# Patient Record
Sex: FEMALE | Race: WHITE | NOT HISPANIC OR LATINO | Employment: FULL TIME | URBAN - METROPOLITAN AREA
[De-identification: names, ages, dates, MRNs, and addresses within clinical notes are randomized per-mention and may not be internally consistent; named-entity substitution may affect disease eponyms.]

---

## 2017-04-03 DIAGNOSIS — E03.9 HYPOTHYROIDISM: ICD-10-CM

## 2017-04-03 RX ORDER — LEVOTHYROXINE SODIUM 137 UG/1
TABLET ORAL
Qty: 30 TABLET | Refills: 1 | Status: SHIPPED | OUTPATIENT
Start: 2017-04-03 | End: 2017-05-24

## 2017-04-03 NOTE — TELEPHONE ENCOUNTER
Patient due for thyroid lab but has appointment with PCP next month.  Put note on appointment reason.     Alyssa Coleman RN

## 2017-05-10 ENCOUNTER — RADIANT APPOINTMENT (OUTPATIENT)
Dept: MAMMOGRAPHY | Facility: CLINIC | Age: 62
End: 2017-05-10
Attending: PHYSICIAN ASSISTANT
Payer: COMMERCIAL

## 2017-05-10 DIAGNOSIS — Z12.31 VISIT FOR SCREENING MAMMOGRAM: ICD-10-CM

## 2017-05-10 PROCEDURE — G0202 SCR MAMMO BI INCL CAD: HCPCS | Mod: TC

## 2017-05-24 ENCOUNTER — OFFICE VISIT (OUTPATIENT)
Dept: FAMILY MEDICINE | Facility: CLINIC | Age: 62
End: 2017-05-24
Payer: COMMERCIAL

## 2017-05-24 VITALS
TEMPERATURE: 98.5 F | WEIGHT: 225 LBS | BODY MASS INDEX: 37.49 KG/M2 | SYSTOLIC BLOOD PRESSURE: 132 MMHG | DIASTOLIC BLOOD PRESSURE: 86 MMHG | OXYGEN SATURATION: 96 % | HEIGHT: 65 IN | HEART RATE: 70 BPM

## 2017-05-24 DIAGNOSIS — E03.8 OTHER SPECIFIED HYPOTHYROIDISM: ICD-10-CM

## 2017-05-24 DIAGNOSIS — I10 ESSENTIAL HYPERTENSION WITH GOAL BLOOD PRESSURE LESS THAN 140/90: Primary | ICD-10-CM

## 2017-05-24 DIAGNOSIS — R06.02 EXERTIONAL SHORTNESS OF BREATH: ICD-10-CM

## 2017-05-24 DIAGNOSIS — E03.9 HYPOTHYROIDISM, UNSPECIFIED TYPE: ICD-10-CM

## 2017-05-24 DIAGNOSIS — R42 DIZZINESS: ICD-10-CM

## 2017-05-24 DIAGNOSIS — E78.5 HYPERLIPIDEMIA LDL GOAL <130: ICD-10-CM

## 2017-05-24 DIAGNOSIS — R20.9 DISTURBANCE OF SKIN SENSATION: ICD-10-CM

## 2017-05-24 LAB
ALBUMIN SERPL-MCNC: 3.6 G/DL (ref 3.4–5)
ANION GAP SERPL CALCULATED.3IONS-SCNC: 8 MMOL/L (ref 3–14)
BUN SERPL-MCNC: 19 MG/DL (ref 7–30)
CALCIUM SERPL-MCNC: 9.5 MG/DL (ref 8.5–10.1)
CHLORIDE SERPL-SCNC: 106 MMOL/L (ref 94–109)
CHOLEST SERPL-MCNC: 231 MG/DL
CO2 SERPL-SCNC: 29 MMOL/L (ref 20–32)
CREAT SERPL-MCNC: 0.84 MG/DL (ref 0.52–1.04)
ERYTHROCYTE [DISTWIDTH] IN BLOOD BY AUTOMATED COUNT: 12.5 % (ref 10–15)
GFR SERPL CREATININE-BSD FRML MDRD: 68 ML/MIN/1.7M2
GLUCOSE SERPL-MCNC: 94 MG/DL (ref 70–99)
HCT VFR BLD AUTO: 40.9 % (ref 35–47)
HDLC SERPL-MCNC: 73 MG/DL
HGB BLD-MCNC: 13.7 G/DL (ref 11.7–15.7)
LDLC SERPL CALC-MCNC: 137 MG/DL
MCH RBC QN AUTO: 32.4 PG (ref 26.5–33)
MCHC RBC AUTO-ENTMCNC: 33.5 G/DL (ref 31.5–36.5)
MCV RBC AUTO: 97 FL (ref 78–100)
NONHDLC SERPL-MCNC: 158 MG/DL
PHOSPHATE SERPL-MCNC: 3.3 MG/DL (ref 2.5–4.5)
PLATELET # BLD AUTO: 216 10E9/L (ref 150–450)
POTASSIUM SERPL-SCNC: 4.2 MMOL/L (ref 3.4–5.3)
RBC # BLD AUTO: 4.23 10E12/L (ref 3.8–5.2)
SODIUM SERPL-SCNC: 143 MMOL/L (ref 133–144)
TRIGL SERPL-MCNC: 106 MG/DL
TSH SERPL DL<=0.005 MIU/L-ACNC: 2.7 MU/L (ref 0.4–4)
VIT B12 SERPL-MCNC: 368 PG/ML (ref 193–986)
WBC # BLD AUTO: 5.1 10E9/L (ref 4–11)

## 2017-05-24 PROCEDURE — 36415 COLL VENOUS BLD VENIPUNCTURE: CPT | Performed by: PHYSICIAN ASSISTANT

## 2017-05-24 PROCEDURE — 85027 COMPLETE CBC AUTOMATED: CPT | Performed by: PHYSICIAN ASSISTANT

## 2017-05-24 PROCEDURE — 80061 LIPID PANEL: CPT | Performed by: PHYSICIAN ASSISTANT

## 2017-05-24 PROCEDURE — 82607 VITAMIN B-12: CPT | Performed by: PHYSICIAN ASSISTANT

## 2017-05-24 PROCEDURE — 80069 RENAL FUNCTION PANEL: CPT | Performed by: PHYSICIAN ASSISTANT

## 2017-05-24 PROCEDURE — 99214 OFFICE O/P EST MOD 30 MIN: CPT | Performed by: PHYSICIAN ASSISTANT

## 2017-05-24 PROCEDURE — 84443 ASSAY THYROID STIM HORMONE: CPT | Performed by: PHYSICIAN ASSISTANT

## 2017-05-24 RX ORDER — LOSARTAN POTASSIUM AND HYDROCHLOROTHIAZIDE 25; 100 MG/1; MG/1
1 TABLET ORAL DAILY
Qty: 90 TABLET | Refills: 1 | Status: SHIPPED | OUTPATIENT
Start: 2017-05-24 | End: 2018-01-21

## 2017-05-24 RX ORDER — LEVOTHYROXINE SODIUM 137 UG/1
TABLET ORAL
Qty: 30 TABLET | Refills: 1 | Status: CANCELLED | OUTPATIENT
Start: 2017-05-24

## 2017-05-24 RX ORDER — SIMVASTATIN 20 MG
20 TABLET ORAL AT BEDTIME
Qty: 90 TABLET | Refills: 1 | Status: SHIPPED | OUTPATIENT
Start: 2017-05-24 | End: 2017-05-24 | Stop reason: DRUGHIGH

## 2017-05-24 RX ORDER — LEVOTHYROXINE SODIUM 137 UG/1
137 TABLET ORAL DAILY
Qty: 90 TABLET | Refills: 3 | Status: SHIPPED | OUTPATIENT
Start: 2017-05-24 | End: 2018-03-29

## 2017-05-24 RX ORDER — SIMVASTATIN 40 MG
40 TABLET ORAL AT BEDTIME
Qty: 90 TABLET | Refills: 3 | Status: SHIPPED | OUTPATIENT
Start: 2017-05-24 | End: 2018-03-29

## 2017-05-24 RX ORDER — METOPROLOL SUCCINATE 100 MG/1
100 TABLET, EXTENDED RELEASE ORAL DAILY
Qty: 90 TABLET | Refills: 1 | Status: SHIPPED | OUTPATIENT
Start: 2017-05-24 | End: 2018-03-05

## 2017-05-24 NOTE — PATIENT INSTRUCTIONS
Contact [facility] [phone] to schedule appointment for the stress test

## 2017-05-24 NOTE — MR AVS SNAPSHOT
"              After Visit Summary   5/24/2017    Migdalia Escalera    MRN: [id number]           Patient Information     Date Of Birth          1955        Visit Information        Provider Department      5/24/2017 8:30 AM Kehr, Kristen M, PA-C [facility]        Today's Diagnoses     Essential hypertension with goal blood pressure less than 140/90    -  1    Hyperlipidemia LDL goal <130        Exertional shortness of breath        Disturbance of skin sensation        Hypothyroidism, unspecified type          Care Instructions    Contact [facility] [phone] to schedule appointment for the stress test            Follow-ups after your visit        Future tests that were ordered for you today     Open Future Orders        Priority Expected Expires Ordered    Exercise Stress Echocardiogram Routine  5/24/2018 5/24/2017            Who to contact     If you have questions or need follow up information about today's clinic visit or your schedule please contact [facility] directly at [phone].  Normal or non-critical lab and imaging results will be communicated to you by MyChart, letter or phone within 4 business days after the clinic has received the results. If you do not hear from us within 7 days, please contact the clinic through [employer] or phone. If you have a critical or abnormal lab result, we will notify you by phone as soon as possible.  Submit refill requests through [employer] or call your pharmacy and they will forward the refill request to us. Please allow 3 business days for your refill to be completed.          Additional Information About Your Visit        MyChart Information     [employer] lets you send messages to your doctor, view your test results, renew your prescriptions, schedule appointments and more. To sign up, go to [email or web address] . Click on \"Log in\" on the left side of the screen, which will take you to the Welcome page. Then click on \"Sign up Now\" on " "the right side of the page.     You will be asked to enter the access code listed below, as well as some personal information. Please follow the directions to create your username and password.     Your access code is: [email or web address]  Expires: 2017  8:55 AM     Your access code will  in 90 days. If you need help or a new code, please call your [facility] or [phone].        Care EveryWhere ID     This is your Care EveryWhere ID. This could be used by other organizations to access your [place] medical records  [id number]        Your Vitals Were     Pulse Temperature Height Pulse Oximetry BMI (Body Mass Index)       70 98.5  F (36.9  C) (Oral) 5' 5\" (1.651 m) 96% 37.44 kg/m2        Blood Pressure from Last 3 Encounters:   17 132/86   10/27/16 136/80   16 132/72    Weight from Last 3 Encounters:   17 225 lb (102.1 kg)   10/27/16 225 lb (102.1 kg)   16 225 lb (102.1 kg)              We Performed the Following     CBC with platelets     Lipid panel reflex to direct LDL     Renal panel     TSH with free T4 reflex     Vitamin B12          Today's Medication Changes          These changes are accurate as of: 17  8:55 AM.  If you have any questions, ask your nurse or doctor.               These medicines have changed or have updated prescriptions.        Dose/Directions    simvastatin 20 MG tablet   Commonly known as:  ZOCOR   This may have changed:  additional instructions   Used for:  Hyperlipidemia LDL goal <130   Changed by:  Kehr, Kristen M, PA-C        Dose:  20 mg   Take 1 tablet (20 mg) by mouth At Bedtime   Quantity:  90 tablet   Refills:  1            Where to get your medicines      These medications were sent to [id number] - [place] - [address]  [address] [address]    Hours:  Same info as Marissa Cote Phone:  [phone]     losartan-hydrochlorothiazide 100-25 MG per tablet    metoprolol 100 MG 24 hr tablet    " simvastatin 20 MG tablet                Primary Care Provider Office Phone # Fax #    Amy M Kehr, PA-C [phone]       [facility] [address] [facility] [address]        Thank you!     Thank you for choosing [facility]  for your care. Our goal is always to provide you with excellent care. Hearing back from our patients is one way we can continue to improve our services. Please take a few minutes to complete the written survey that you may receive in the mail after your visit with us. Thank you!             Your Updated Medication List - Protect others around you: Learn how to safely use, store and throw away your medicines at www.disposemymeds.org.          This list is accurate as of: 5/24/17  8:55 AM.  Always use your most recent med list.                   Brand Name Dispense Instructions for use    albuterol 108 (90 BASE) MCG/ACT Inhaler    PROAIR HFA/PROVENTIL HFA/VENTOLIN HFA    1 Inhaler    Inhale 2 puffs into the lungs every 4 hours as needed for shortness of breath / dyspnea or wheezing Use with spacer       aspirin 81 MG tablet      Take 1 tablet by mouth daily.       furosemide 20 MG tablet    LASIX    30 tablet    Take 1 tablet (20 mg) by mouth daily       levothyroxine 137 MCG tablet    SYNTHROID/LEVOTHROID    30 tablet    TAKE 1 TABLET (137 MCG) BY ORAL ROUTE ONCE DAILY       losartan-hydrochlorothiazide 100-25 MG per tablet    HYZAAR    90 tablet    Take 1 tablet by mouth daily APPT NEEDED FOR FURTHER REFILLS       metoprolol 100 MG 24 hr tablet    TOPROL-XL    90 tablet    Take 1 tablet (100 mg) by mouth daily APPT NEEDED FOR FURTHER REFILLS       order for DME     1 Units    Equipment being ordered: compression stockings / mild compression       silver sulfADIAZINE 1 % cream    SILVADENE    400 g    Apply topically 2 times daily       simvastatin 20 MG tablet    ZOCOR    90 tablet    Take 1 tablet (20 mg) by mouth At Bedtime       spacer/aero-hold  chamber mask Olya     1 each    1 Adult size spacer to use with MDI inhalers.

## 2017-05-24 NOTE — PROGRESS NOTES
SUBJECTIVE:                                                    Migdalia Escalera is a 62 year old female who presents to clinic today for the following health issues:      Hypertension Follow-up      Outpatient blood pressures are not being checked.    Low Salt Diet: no added salt     Hypothyroidism Follow-up      Since last visit, patient describes the following symptoms: Weight stable, no hair loss, no skin changes, no constipation, no loose stools       Amount of exercise or physical activity:     Problems taking medications regularly: No    Medication side effects: none    Diet:       PROBLEMS TO ADD ON...  1. Dario has been having episodic dizzy / vertigo spells. They are positional. Mainly with bending forward. There is no congestion / allergy symptoms. The episodes will last for a few seconds and resolve. She does not check her blood pressure at home. She has had good control of her blood pressure with her current medications. She has been taking her medications daily. She has also noticed tingling in her feet.     2. She also started exercising on her elliptical machine at home. She was short of breath when she first started and had to stop. She continued trying to exercise, but the same thing would happen. She used her inhaler and it did not help. She thought it would improve the more she exercised, but after a month of trying, she quit. She would have to sit down to catch her breath after 45 minutes. She was never able to build stamina. No dizziness or chest pain / pressure with exercise.     Problem list and histories reviewed & adjusted, as indicated.  Additional history: as documented    Patient Active Problem List   Diagnosis     Hyperlipidemia LDL goal <130     Advanced directives, counseling/discussion     H/O: hysterectomy     Vitamin D deficiency     Hypothyroidism, unspecified hypothyroidism type     History of colonic polyps     Essential hypertension with goal blood pressure less than 140/90      Past Surgical History:   Procedure Laterality Date     HYSTERECTOMY, PAP NO LONGER INDICATED       SURGICAL HISTORY OF -       thoracic spine       Social History   Substance Use Topics     Smoking status: Former Smoker     Types: Cigarettes     Quit date: 12/1/2005     Smokeless tobacco: Never Used      Comment: no 2nd hand smoke exposure     Alcohol use Yes      Comment: occ.     Family History   Problem Relation Age of Onset     Arthritis Mother      Prostate Cancer Father      Hypertension Father      CANCER Father      bone     DIABETES Maternal Grandfather      Thyroid Disease Sister          Current Outpatient Prescriptions   Medication Sig Dispense Refill     losartan-hydrochlorothiazide (HYZAAR) 100-25 MG per tablet Take 1 tablet by mouth daily APPT NEEDED FOR FURTHER REFILLS 90 tablet 1     metoprolol (TOPROL-XL) 100 MG 24 hr tablet Take 1 tablet (100 mg) by mouth daily APPT NEEDED FOR FURTHER REFILLS 90 tablet 1     simvastatin (ZOCOR) 20 MG tablet Take 1 tablet (20 mg) by mouth At Bedtime 90 tablet 1     levothyroxine (SYNTHROID/LEVOTHROID) 137 MCG tablet TAKE 1 TABLET (137 MCG) BY ORAL ROUTE ONCE DAILY 30 tablet 1     Spacer/Aero-Holding Chambers (SPACER/AERO-HOLD CHAMBER MASK) KOTA 1 Adult size spacer to use with MDI inhalers. 1 each 0     albuterol (PROAIR HFA, PROVENTIL HFA, VENTOLIN HFA) 108 (90 BASE) MCG/ACT inhaler Inhale 2 puffs into the lungs every 4 hours as needed for shortness of breath / dyspnea or wheezing Use with spacer 1 Inhaler 0     order for DME Equipment being ordered: compression stockings / mild compression 1 Units 3     furosemide (LASIX) 20 MG tablet Take 1 tablet (20 mg) by mouth daily 30 tablet 1     silver sulfADIAZINE (SILVADENE) 1 % cream Apply topically 2 times daily 400 g 0     aspirin 81 MG tablet Take 1 tablet by mouth daily.       [DISCONTINUED] losartan-hydrochlorothiazide (HYZAAR) 100-25 MG per tablet Take 1 tablet by mouth daily APPT NEEDED FOR FURTHER REFILLS 90  "tablet 1     [DISCONTINUED] metoprolol (TOPROL-XL) 100 MG 24 hr tablet Take 1 tablet (100 mg) by mouth daily APPT NEEDED FOR FURTHER REFILLS 90 tablet 1     [DISCONTINUED] simvastatin (ZOCOR) 20 MG tablet Take 1 tablet (20 mg) by mouth At Bedtime APPT NEEDED FOR FURTHER REFILLS 90 tablet 1     Allergies   Allergen Reactions     Amoxicillin      Erythromycin        Reviewed and updated as needed this visit by clinical staff  Tobacco  Allergies  Meds  Med Hx  Surg Hx  Fam Hx  Soc Hx      Reviewed and updated as needed this visit by Provider         ROS:  C: NEGATIVE for fever, chills, change in weight  INTEGUMENTARY/SKIN: NEGATIVE for worrisome rashes, moles or lesions  E/M: NEGATIVE for ear, mouth and throat problems  R: NEGATIVE for significant cough or SOB  CV: NEGATIVE for chest pain, palpitations or peripheral edema  MUSCULOSKELETAL: NEGATIVE for significant arthralgias or myalgia  PSYCHIATRIC: NEGATIVE for changes in mood or affect    OBJECTIVE:                                                    /86  Pulse 70  Temp 98.5  F (36.9  C) (Oral)  Ht 5' 5\" (1.651 m)  Wt 225 lb (102.1 kg)  SpO2 96%  BMI 37.44 kg/m2  Body mass index is 37.44 kg/(m^2).  GENERAL: healthy, alert and no distress  RESP: lungs clear to auscultation - no rales, rhonchi or wheezes  CV: regular rate and rhythm, normal S1 S2, no S3 or S4, no murmur, click or rub, no peripheral edema and peripheral pulses strong  MS: no gross musculoskeletal defects noted, no edema  SKIN: no suspicious lesions or rashes  PSYCH: mentation appears normal, affect normal/bright    Diagnostic Test Results:  pending     ASSESSMENT/PLAN:                                                      1. Essential hypertension with goal blood pressure less than 140/90  Stable blood pressure on her medications. She will continue for now.   Initial reading was high, but improved to goal after she sits for awhile.   - losartan-hydrochlorothiazide (HYZAAR) 100-25 MG per " tablet; Take 1 tablet by mouth daily APPT NEEDED FOR FURTHER REFILLS  Dispense: 90 tablet; Refill: 1  - metoprolol (TOPROL-XL) 100 MG 24 hr tablet; Take 1 tablet (100 mg) by mouth daily APPT NEEDED FOR FURTHER REFILLS  Dispense: 90 tablet; Refill: 1  - Renal panel  - CBC with platelets    2. Hyperlipidemia LDL goal <130  Stable on her medication, refills given  - simvastatin (ZOCOR) 20 MG tablet; Take 1 tablet (20 mg) by mouth At Bedtime  Dispense: 90 tablet; Refill: 1  - Lipid panel reflex to direct LDL    3. Exertional shortness of breath  New symptom for her. Plan stress echo.   She will need to hold her metoprolol the day of her procedure.   The beta blocker may also contribute to this symptom, however she has tolerated exercise on this medication in the past. I will contact her with results as soon as available.   - Exercise Stress Echocardiogram; Future    4. Disturbance of skin sensation  5. dizzines  Thyroid test is also due to be checked today.   Check the following tests.   -renal panel  - CBC with platelets  - Vitamin B12    5. Hypothyroidism, unspecified type  She has been stable on her current dose of medication for years.   Plan TSH today and send refills when results are back.   - TSH with free T4 reflex    Recommend that Migdalia schedule an appointment to discuss concerns as they come up rather than try to add them into one 20 minute appointment time so that appropriate time can be spent on each concern. She may have to return to discuss again after results are back. I will be talking to her again after results are back.   30 minutes spent with her today. Over 50% of time taken for discussion of above, counseling and coordination of care.     Kristen M. Kehr, PA-C  [facility]        ADDENDUM:   Lab results are normal except for her cholesterol.  Increase the dose of the simvastatin to 40 mg daily.   Thyroid test is normal, refills sent at current dose.   Kristen Kehr PA-C  3:14 PM May  24, 2017

## 2017-05-24 NOTE — PROGRESS NOTES
Please send a letter with a copy of results and the following:    Adam Negron is a copy of your lab results. All tests were normal except for a mild elevation of your cholesterol.   I am going to have you increase the dose of the simvastatin to 40 mg daily. You can take 2 of the dose that you currently have and then I will send a new prescription for the 40 mg dose.     I will also be waiting to hear the stress test results and get back to you as soon as they are available.     If you have any questions or concerns, please contact the clinic at [phone].    Thank you,      Kristen Kehr PA-C

## 2017-05-24 NOTE — NURSING NOTE
"Chief Complaint   Patient presents with     Hypertension     recheck     Thyroid Disease       Initial /86  Pulse 70  Temp 98.5  F (36.9  C) (Oral)  Ht 5' 5\" (1.651 m)  Wt 225 lb (102.1 kg)  SpO2 96%  BMI 37.44 kg/m2 Estimated body mass index is 37.44 kg/(m^2) as calculated from the following:    Height as of this encounter: 5' 5\" (1.651 m).    Weight as of this encounter: 225 lb (102.1 kg).  Medication Reconciliation: complete    HORTENCIA Garcia MA    "

## 2017-05-24 NOTE — LETTER
[facility]  [address] [facility] [address] [phone]        May 24, 2017    Migdalia Escalera  [address] [facility] [address]            Dear Migdalia,    Enclosed is a copy of your lab results. All tests were normal except for a mild elevation of your cholesterol.   I am going to have you increase the dose of the simvastatin to 40 mg daily. You can take 2 of the dose that you currently have and then I will send a new prescription for the 40 mg dose.     I will also be waiting to hear the stress test results and get back to you as soon as they are available.     If you have any questions or concerns, please contact the clinic at [phone].     Thank you,       Kristen Kehr PA-C/sumanth    Results for orders placed or performed in visit on 05/24/17   Renal panel   Result Value Ref Range    Sodium 143 133 - 144 mmol/L    Potassium 4.2 3.4 - 5.3 mmol/L    Chloride 106 94 - 109 mmol/L    Carbon Dioxide 29 20 - 32 mmol/L    Anion Gap 8 3 - 14 mmol/L    Glucose 94 70 - 99 mg/dL    Urea Nitrogen 19 7 - 30 mg/dL    Creatinine 0.84 0.52 - 1.04 mg/dL    GFR Estimate 68 >60 mL/min/1.7m2    GFR Estimate If Black 82 >60 mL/min/1.7m2    Calcium 9.5 8.5 - 10.1 mg/dL    Phosphorus 3.3 2.5 - 4.5 mg/dL    Albumin 3.6 3.4 - 5.0 g/dL   CBC with platelets   Result Value Ref Range    WBC 5.1 4.0 - 11.0 10e9/L    RBC Count 4.23 3.8 - 5.2 10e12/L    Hemoglobin 13.7 11.7 - 15.7 g/dL    Hematocrit 40.9 35.0 - 47.0 %    MCV 97 78 - 100 fl    MCH 32.4 26.5 - 33.0 pg    MCHC 33.5 31.5 - 36.5 g/dL    RDW 12.5 10.0 - 15.0 %    Platelet Count 216 150 - 450 10e9/L   Vitamin B12   Result Value Ref Range    Vitamin B12 368 193 - 986 pg/mL   TSH with free T4 reflex   Result Value Ref Range    TSH 2.70 0.40 - 4.00 mU/L   Lipid panel reflex to direct LDL   Result Value Ref Range    Cholesterol 231 (H) <200 mg/dL    Triglycerides 106 <150 mg/dL    HDL Cholesterol 73 >49 mg/dL    LDL Cholesterol Calculated 137 (H) <100  mg/dL    Non HDL Cholesterol 158 (H) <130 mg/dL

## 2017-06-07 ENCOUNTER — TELEPHONE (OUTPATIENT)
Dept: FAMILY MEDICINE | Facility: CLINIC | Age: 62
End: 2017-06-07

## 2017-06-07 DIAGNOSIS — R06.02 EXERTIONAL SHORTNESS OF BREATH: Primary | ICD-10-CM

## 2017-06-07 NOTE — TELEPHONE ENCOUNTER
Left message on answering machine for patient to call back. [phone] til 7pm or tomorrow [phone]  Dania LEUNGN, RN, CPN

## 2017-06-07 NOTE — TELEPHONE ENCOUNTER
Needs to discuss the medication toprol, could not do her stress test due to taking the medication.  What should be taking instead, please advise.  Okay to leave message.

## 2017-06-07 NOTE — TELEPHONE ENCOUNTER
Was told cannot take dose of metoprolol, could take a lower dose or try different med, current dose is too high and would not be able to get heart rate up enough for stress test. To provider to advise..Dania LEUNGN, RN, CPN

## 2017-06-08 NOTE — TELEPHONE ENCOUNTER
I briefly saw the message and was unable to respond. I don't know what type of stress test to order.   She will need to have a consultation with Cardiology to determine the best strategy for this. I will put in the order and she can make the appointment for consultation.   Kristen Kehr PA-C

## 2017-06-09 NOTE — TELEPHONE ENCOUNTER
Information below is reviewed with  Kristen Kehr, PA-C, Verbal Orders patient needs to have a consult with Cardiologist.  Left message on answering machine for patient/parent to call back.   [phone].  Alexia Aguirre RN

## 2017-06-09 NOTE — TELEPHONE ENCOUNTER
Patient states she just saw Cardiology Tuesday and they would not do stress test due to patient taking Metoprolol. States no one told her to stop taking it before the test. Requesting if she could stop the medication for her test. Has reschedule stress test to Tuesday 6-13-17. Left message for [facility] medical records to call me back regarding last office notes./Tina Swanson,

## 2017-06-12 NOTE — TELEPHONE ENCOUNTER
Left message on answering machine for patient/parent to call back.   [phone].  Alexia Aguirre RN

## 2017-06-13 ENCOUNTER — RADIANT APPOINTMENT (OUTPATIENT)
Dept: CARDIOLOGY | Facility: CLINIC | Age: 62
End: 2017-06-13
Attending: PHYSICIAN ASSISTANT
Payer: COMMERCIAL

## 2017-06-13 DIAGNOSIS — R06.02 EXERTIONAL SHORTNESS OF BREATH: ICD-10-CM

## 2017-06-13 PROCEDURE — 93352 ADMIN ECG CONTRAST AGENT: CPT | Performed by: INTERNAL MEDICINE

## 2017-06-13 PROCEDURE — 93350 STRESS TTE ONLY: CPT | Mod: TC | Performed by: INTERNAL MEDICINE

## 2017-06-13 PROCEDURE — 93018 CV STRESS TEST I&R ONLY: CPT | Performed by: INTERNAL MEDICINE

## 2017-06-13 PROCEDURE — 93325 DOPPLER ECHO COLOR FLOW MAPG: CPT | Mod: TC | Performed by: INTERNAL MEDICINE

## 2017-06-13 PROCEDURE — 93017 CV STRESS TEST TRACING ONLY: CPT | Performed by: INTERNAL MEDICINE

## 2017-06-13 PROCEDURE — 93321 DOPPLER ECHO F-UP/LMTD STD: CPT | Mod: 26 | Performed by: INTERNAL MEDICINE

## 2017-06-13 PROCEDURE — 40000264 ECHO STRESS WITH OPTISON: Performed by: INTERNAL MEDICINE

## 2017-06-13 PROCEDURE — 93325 DOPPLER ECHO COLOR FLOW MAPG: CPT | Mod: 26 | Performed by: INTERNAL MEDICINE

## 2017-06-13 PROCEDURE — 93321 DOPPLER ECHO F-UP/LMTD STD: CPT | Mod: TC | Performed by: INTERNAL MEDICINE

## 2017-06-13 PROCEDURE — 93350 STRESS TTE ONLY: CPT | Mod: 26 | Performed by: INTERNAL MEDICINE

## 2017-06-13 PROCEDURE — 93016 CV STRESS TEST SUPVJ ONLY: CPT | Performed by: INTERNAL MEDICINE

## 2017-06-13 RX ADMIN — Medication 3 ML: at 09:15

## 2017-06-13 NOTE — NURSING NOTE
Bicycle Stress Echocardiogram with Optison performed.  IV started in LAC.    Optison:   3ml Optison mixed with 6ml Saline - [id number]  Amount used: 3.0ml, 6.0ml wasted

## 2017-06-13 NOTE — TELEPHONE ENCOUNTER
Left message on answering machine for patient/parent to call back.   [phone].  Alexia Aguirre RN

## 2017-06-14 NOTE — TELEPHONE ENCOUNTER
She was instructed to see the Cardiologist prior to getting the stress test to determine, but went forward with scheduling instead.   They were unable to complete the stress test because of elevated blood pressure.   Please make the appointment with Cardiology for consultation as discussed prior to trying this stress test.   Thank you. Kristen Kehr PA-C

## 2017-06-14 NOTE — TELEPHONE ENCOUNTER
Patient/parent is informed of MD note below, as it is written. Verbalized good understanding.  Specialty phone # is [phone] to schedule an appointment   Patient expressed frustration that she was not communicated this.   Reviewed this telephone encounter with her.  Discussed difficulty communicating with her.   Discussed work number does not identify her as the owner to the phone.     ALEKSEY Aguirre RN

## 2017-06-14 NOTE — TELEPHONE ENCOUNTER
Patient left voicemail message last night stating she did not take her Metoprolol for several days and had her stress test yesterday.  Please advise on plan.   ? Proceed with Cardiology referral.  Still have not communicated with patient directly.   Alexia Aguirre RN

## 2017-06-15 NOTE — PROGRESS NOTES
This patient was supposed to see Cardiology for consult prior to having a stress test. Please see telephone encounter. She has not followed advice given, but we will continue to try to contact her to schedule her consult appointment to determine any further testing needed. Kristen Kehr PA-C

## 2017-06-19 ENCOUNTER — TELEPHONE (OUTPATIENT)
Dept: FAMILY MEDICINE | Facility: CLINIC | Age: 62
End: 2017-06-19

## 2017-06-19 DIAGNOSIS — R06.02 EXERTIONAL SHORTNESS OF BREATH: Primary | ICD-10-CM

## 2017-06-19 NOTE — TELEPHONE ENCOUNTER
Spoke to patient regarding stress test. She would like a referral to [facility]. Her preferred contact number is [phone] (work). Route to provider to advise on referral. Tina Howell RN      Result Notes   Notes Recorded by Kehr, Kristen M, PA-C on 6/15/2017 at 8:35 AM  This patient was supposed to see Cardiology for consult prior to having a stress test. Please see telephone encounter. She has not followed advice given, but we will continue to try to contact her to schedule her consult appointment to determine any further testing needed. Kristen Kehr PA-C

## 2017-06-30 ENCOUNTER — TELEPHONE (OUTPATIENT)
Dept: FAMILY MEDICINE | Facility: CLINIC | Age: 62
End: 2017-06-30

## 2017-06-30 NOTE — TELEPHONE ENCOUNTER
Notified Molly patient needs to see Cardiology and they are to order stress test. States understanding. Faxed order to Molly at [phone]./Tina Swanson,

## 2017-06-30 NOTE — TELEPHONE ENCOUNTER
Caller states patient called to schedule an order for a stress test, do not have orders need order faxed to [phone].

## 2017-07-18 ENCOUNTER — TELEPHONE (OUTPATIENT)
Dept: FAMILY MEDICINE | Facility: CLINIC | Age: 62
End: 2017-07-18

## 2017-07-18 ENCOUNTER — TRANSFERRED RECORDS (OUTPATIENT)
Dept: HEALTH INFORMATION MANAGEMENT | Facility: CLINIC | Age: 62
End: 2017-07-18

## 2017-07-18 NOTE — TELEPHONE ENCOUNTER
Patient referred for shortness of breath. They would like last office notes, echo, ekg, and labs. please fax to [phone]-Attn: Lizzy

## 2017-08-08 ENCOUNTER — OFFICE VISIT (OUTPATIENT)
Dept: FAMILY MEDICINE | Facility: CLINIC | Age: 62
End: 2017-08-08
Payer: COMMERCIAL

## 2017-08-08 ENCOUNTER — RADIANT APPOINTMENT (OUTPATIENT)
Dept: ULTRASOUND IMAGING | Facility: CLINIC | Age: 62
End: 2017-08-08
Attending: FAMILY MEDICINE
Payer: COMMERCIAL

## 2017-08-08 VITALS
BODY MASS INDEX: 37.61 KG/M2 | DIASTOLIC BLOOD PRESSURE: 80 MMHG | WEIGHT: 226 LBS | SYSTOLIC BLOOD PRESSURE: 155 MMHG | OXYGEN SATURATION: 100 % | TEMPERATURE: 97.4 F | HEART RATE: 77 BPM

## 2017-08-08 DIAGNOSIS — I73.9 CLAUDICATION (H): ICD-10-CM

## 2017-08-08 DIAGNOSIS — R06.09 DOE (DYSPNEA ON EXERTION): Primary | ICD-10-CM

## 2017-08-08 LAB
FEF 25/75: NORMAL
FEV-1: NORMAL
FEV1/FVC: NORMAL
FVC: NORMAL

## 2017-08-08 PROCEDURE — 93922 UPR/L XTREMITY ART 2 LEVELS: CPT

## 2017-08-08 PROCEDURE — 94010 BREATHING CAPACITY TEST: CPT | Performed by: FAMILY MEDICINE

## 2017-08-08 PROCEDURE — 99213 OFFICE O/P EST LOW 20 MIN: CPT | Mod: 25 | Performed by: FAMILY MEDICINE

## 2017-08-08 NOTE — MR AVS SNAPSHOT
"              After Visit Summary   2017    Migdalia Escalera    MRN: [id number]           Patient Information     Date Of Birth          1955        Visit Information        Provider Department      2017 8:45 AM Nito Lindquist MD [facility]        Today's Diagnoses     SEVILLA (dyspnea on exertion)    -  1    Claudication (H)           Follow-ups after your visit        Who to contact     If you have questions or need follow up information about today's clinic visit or your schedule please contact [facility] directly at [phone].  Normal or non-critical lab and imaging results will be communicated to you by byydhart, letter or phone within 4 business days after the clinic has received the results. If you do not hear from us within 7 days, please contact the clinic through byydhart or phone. If you have a critical or abnormal lab result, we will notify you by phone as soon as possible.  Submit refill requests through [employer] or call your pharmacy and they will forward the refill request to us. Please allow 3 business days for your refill to be completed.          Additional Information About Your Visit        MyChart Information     [employer] lets you send messages to your doctor, view your test results, renew your prescriptions, schedule appointments and more. To sign up, go to [email or web address] . Click on \"Log in\" on the left side of the screen, which will take you to the Welcome page. Then click on \"Sign up Now\" on the right side of the page.     You will be asked to enter the access code listed below, as well as some personal information. Please follow the directions to create your username and password.     Your access code is: [email or web address]  Expires: 2017  8:55 AM     Your access code will  in 90 days. If you need help or a new code, please call your [facility] or [phone].        Care EveryWhere ID     This is your Care EveryWhere ID. This " could be used by other organizations to access your [facility] medical records  [id number]        Your Vitals Were     Pulse Temperature Pulse Oximetry BMI (Body Mass Index)          77 97.4  F (36.3  C) (Oral) 100% 37.61 kg/m2         Blood Pressure from Last 3 Encounters:   08/08/17 155/80   05/24/17 132/86   10/27/16 136/80    Weight from Last 3 Encounters:   08/08/17 226 lb (102.5 kg)   05/24/17 225 lb (102.1 kg)   10/27/16 225 lb (102.1 kg)              We Performed the Following     Spirometry, Breathing Capacity        Primary Care Provider Office Phone # Fax #    Kristen M Kehr, PA-C [phone]       [facility] [address] [facility] [address]        Equal Access to Services     CARTER STERLING : Hadii aad shalini hadasho Soomaali, waaxda luqadaha, qaybta kaalmada ademattyyada, fanta yeager . So [facility] [phone].    ATENCIÓN: Si habla español, tiene a cordova disposición servicios gratuitos de asistencia lingüística. Llame al [phone].    We comply with applicable federal civil rights laws and [place] laws. We do not discriminate on the basis of race, color, national origin, age, disability sex, sexual orientation or gender identity.            Thank you!     Thank you for choosing [facility]  for your care. Our goal is always to provide you with excellent care. Hearing back from our patients is one way we can continue to improve our services. Please take a few minutes to complete the written survey that you may receive in the mail after your visit with us. Thank you!             Your Updated Medication List - Protect others around you: Learn how to safely use, store and throw away your medicines at www.disposemymeds.org.          This list is accurate as of: 8/8/17  2:07 PM.  Always use your most recent med list.                   Brand Name Dispense Instructions for use Diagnosis    albuterol 108 (90 BASE) MCG/ACT Inhaler    PROAIR  HFA/PROVENTIL HFA/VENTOLIN HFA    1 Inhaler    Inhale 2 puffs into the lungs every 4 hours as needed for shortness of breath / dyspnea or wheezing Use with spacer    Wheezing, SOB (shortness of breath)       aspirin 81 MG tablet      Take 1 tablet by mouth daily.        furosemide 20 MG tablet    LASIX    30 tablet    Take 1 tablet (20 mg) by mouth daily    Bilateral edema of lower extremity       levothyroxine 137 MCG tablet    SYNTHROID/LEVOTHROID    90 tablet    Take 1 tablet (137 mcg) by mouth daily    Other specified hypothyroidism       losartan-hydrochlorothiazide 100-25 MG per tablet    HYZAAR    90 tablet    Take 1 tablet by mouth daily APPT NEEDED FOR FURTHER REFILLS    Essential hypertension with goal blood pressure less than 140/90       metoprolol 100 MG 24 hr tablet    TOPROL-XL    90 tablet    Take 1 tablet (100 mg) by mouth daily APPT NEEDED FOR FURTHER REFILLS    Essential hypertension with goal blood pressure less than 140/90       order for DME     1 Units    Equipment being ordered: compression stockings / mild compression    Bilateral edema of lower extremity       silver sulfADIAZINE 1 % cream    SILVADENE    400 g    Apply topically 2 times daily    Burn of finger, right, unspecified degree, initial encounter       simvastatin 40 MG tablet    ZOCOR    90 tablet    Take 1 tablet (40 mg) by mouth At Bedtime    Hyperlipidemia LDL goal <130       spacer/aero-hold chamber mask Olya     1 each    1 Adult size spacer to use with MDI inhalers.    Wheezing, SOB (shortness of breath)

## 2017-08-08 NOTE — PROGRESS NOTES
"SUBJECTIVE:  62 year old.The patient has a history of shortness of breath.  This started several months ago. Patient states the SOB is worse with exercise. Associated symptoms are claudication pain in lower extremities. She describes this as a \"achy\" pain.  The shortness of breath and claudication pain is relieved with rest. ROS significant for shortness of breath and lower extremity pain. The patient recently saw a cardiologist and had an Angiogram study done roughly 2 weeks ago. She reports that they told her the study was negative. She does have a follow up with her cardiologist at the end of the month. She is here today to discuss next steps going forward.  Patient also reports that these symptoms have caused her a great deal of anxiety.   Reviewed health maintenance  Patient Active Problem List   Diagnosis     Hyperlipidemia LDL goal <130     Advanced directives, counseling/discussion     H/O: hysterectomy     Vitamin D deficiency     Hypothyroidism, unspecified hypothyroidism type     History of colonic polyps     Essential hypertension with goal blood pressure less than 140/90     Past Medical History:   Diagnosis Date     High cholesterol      HTN      Hypothyroid      Pulmonary actinomycosis (H)      Seasonal allergies        OBJECTIVE:  no apparent distress  /80  Pulse 77  Temp 97.4  F (36.3  C) (Oral)  Wt 226 lb (102.5 kg)  SpO2 100%  BMI 37.61 kg/m2    LUNGS:  CTA B/L, no wheezing or crackles.   Cardiovascular: negative, normal S1 and S2, no mumurs, gallops or rubs. RRR.  renée negative         ICD-10-CM    1. SEVILLA (dyspnea on exertion) R06.09 Spirometry, Breathing Capacity    PLAN: Spirometry test ordered. We would like to evaluate for possible pulmonary causes for her SOB. She will follow-up with her cardiologist as planned, however we will evaluate her lungs in the interim. 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 If her spirometry results are inconclusive and or normal, LCH may need further evaluation. Patient was agreeable with this plan.     Scribed by Saadia Santizo MS3 under the supervision of Dr. Lindquist.   I agree with the above plan  Nito Lindquist MD

## 2017-08-08 NOTE — NURSING NOTE
"Chief Complaint   Patient presents with     RECHECK     angiogram done at [facility], no blockage found.  Would like to discuss possible disease reoccurrance        Initial /81  Pulse 77  Temp 97.4  F (36.3  C) (Oral)  Wt 226 lb (102.5 kg)  SpO2 100%  BMI 37.61 kg/m2 Estimated body mass index is 37.61 kg/(m^2) as calculated from the following:    Height as of 5/24/17: 5' 5\" (1.651 m).    Weight as of this encounter: 226 lb (102.5 kg).  Medication Reconciliation: complete  Yonas Olmos CMA    "

## 2017-09-25 ENCOUNTER — TELEPHONE (OUTPATIENT)
Dept: FAMILY MEDICINE | Facility: CLINIC | Age: 62
End: 2017-09-25

## 2017-09-25 NOTE — LETTER
Migdalia Escalera  [address] [place] [address]          September 25, 2017          DeaSonia Escalera      Please schedule an Ancillary appointment or walk into our [facility] pharmacy for your blood pressure recheck. Your Monitoring and managing your preventative and chronic health conditions are very important to us.      If you have received your health care elsewhere, please provide us with that information so it can be documented in your chart.    Please call [phone] or message us through your [employer] account to schedule an appointment or provide information for your chart.     We look forward to seeing you and working with you on your health care needs.     Sincerely,   Chandan DICKSON MA            *If you have already scheduled an appointment, please disregard this reminder

## 2017-09-25 NOTE — TELEPHONE ENCOUNTER
Panel Management Review      Patient has the following on her problem list:     Hypertension   Last three blood pressure readings:  BP Readings from Last 3 Encounters:   08/08/17 155/80   05/24/17 132/86   10/27/16 136/80     Blood pressure: FAILED    HTN Guidelines:  Age 18-59 BP range:  Less than 140/90  Age 60-85 with Diabetes:  Less than 140/90  Age 60-85 without Diabetes:  less than 150/90        Composite cancer screening  Chart review shows that this patient is due/due soon for the following None  Summary:    Patient is due/failing the following:   BP CHECK    Action needed:   Patient will need to schedule an ancillary appointment or walk into our [facility] pharmacy for a blood pressure recheck. Blood pressure was high from last office visit.     Type of outreach:    Sent letter.    Questions for provider review:    None                                                                                                                                    Chandan DICKSON MA       Chart routed to close .

## 2018-01-21 DIAGNOSIS — I10 ESSENTIAL HYPERTENSION WITH GOAL BLOOD PRESSURE LESS THAN 140/90: ICD-10-CM

## 2018-01-23 RX ORDER — LOSARTAN POTASSIUM AND HYDROCHLOROTHIAZIDE 25; 100 MG/1; MG/1
TABLET ORAL
Qty: 30 TABLET | Refills: 0 | Status: SHIPPED | OUTPATIENT
Start: 2018-01-23 | End: 2018-03-05

## 2018-02-22 ENCOUNTER — TELEPHONE (OUTPATIENT)
Dept: FAMILY MEDICINE | Facility: CLINIC | Age: 63
End: 2018-02-22

## 2018-02-22 NOTE — TELEPHONE ENCOUNTER
Panel Management Review      Patient has the following on her problem list:     Hypertension   Last three blood pressure readings:  BP Readings from Last 3 Encounters:   08/08/17 155/80   05/24/17 132/86   10/27/16 136/80     Blood pressure: FAILED    HTN Guidelines:  Age 18-59 BP range:  Less than 140/90  Age 60-85 with Diabetes:  Less than 140/90  Age 60-85 without Diabetes:  less than 150/90      Composite cancer screening  Chart review shows that this patient is due/due soon for the following None  Summary:    Patient is due/failing the following:   BP CHECK    Action needed:   Patient need a ancillary nurse or walk into our [facility] pharmacy for her blood pressure recheck.    Type of outreach:    Sent letter.    Questions for provider review:    None                                                                                                                                    Chandan DICKSON MA       Chart routed to Chandan DICKSON MA .

## 2018-02-22 NOTE — LETTER
Migdalia Escalera  [address] [place] [id number]          February 22, 2018          Dear Migdalia Escalera      Our records indicate that you have not scheduled for a(n)Ancillary visit for blood pressure recheck or walk into our [facility] [place] pharmacy which was recommended by your health care team. Monitoring and managing your preventative and chronic health conditions are very important to us.       If you have received your health care elsewhere, please provide us with that information so it can be documented in your chart.    Please call [phone] or message us through your [employer] account to schedule an appointment or provide information for your chart.     We look forward to seeing you and working with you on your health care needs.     Sincerely,   Chandan DICKSON MA            *If you have already scheduled an appointment, please disregard this reminder

## 2018-03-03 ENCOUNTER — TELEPHONE (OUTPATIENT)
Dept: FAMILY MEDICINE | Facility: CLINIC | Age: 63
End: 2018-03-03

## 2018-03-03 DIAGNOSIS — I10 ESSENTIAL HYPERTENSION WITH GOAL BLOOD PRESSURE LESS THAN 140/90: ICD-10-CM

## 2018-03-03 DIAGNOSIS — E78.5 HYPERLIPIDEMIA LDL GOAL <130: ICD-10-CM

## 2018-03-03 NOTE — TELEPHONE ENCOUNTER
Reason for Call:  Other prescription    Detailed comments: Patient called and is requesting a medication refill. She has scheduled an appointment for the end of March and would like to know if it is possible that she is supplied until then. Please follow up with patient.    Phone Number Patient can be reached at: Home number on file [phone] (home)    Best Time: Any time.    Can we leave a detailed message on this number? YES    Call taken on 3/3/2018 at 3:08 PM by Scooby Encarnacion

## 2018-03-05 RX ORDER — LOSARTAN POTASSIUM AND HYDROCHLOROTHIAZIDE 25; 100 MG/1; MG/1
1 TABLET ORAL DAILY
Qty: 30 TABLET | Refills: 0 | Status: SHIPPED | OUTPATIENT
Start: 2018-03-05 | End: 2018-03-29

## 2018-03-05 RX ORDER — METOPROLOL SUCCINATE 100 MG/1
100 TABLET, EXTENDED RELEASE ORAL DAILY
Qty: 30 TABLET | Refills: 0 | Status: SHIPPED | OUTPATIENT
Start: 2018-03-05 | End: 2018-03-29

## 2018-03-05 NOTE — TELEPHONE ENCOUNTER
Uncertain which medication is being referred to.  Left message on answering machine for patient/parent to call back.   [phone].  Alexia Aguirre RN

## 2018-03-06 NOTE — TELEPHONE ENCOUNTER
Spoke to patient needs refills on Losartan/HCTZ and Metoprolol.  Medication refilled per RN protocol x 30 days.  Need to keep upcoming appointment for further refills  The patient/parent agrees with the plan and verbalized good understanding.    Alexia Aguirre RN

## 2018-03-09 ENCOUNTER — OFFICE VISIT (OUTPATIENT)
Dept: URGENT CARE | Facility: URGENT CARE | Age: 63
End: 2018-03-09
Payer: COMMERCIAL

## 2018-03-09 VITALS
SYSTOLIC BLOOD PRESSURE: 146 MMHG | TEMPERATURE: 98.3 F | OXYGEN SATURATION: 97 % | RESPIRATION RATE: 16 BRPM | DIASTOLIC BLOOD PRESSURE: 88 MMHG | HEART RATE: 75 BPM | BODY MASS INDEX: 37.94 KG/M2 | WEIGHT: 228 LBS

## 2018-03-09 DIAGNOSIS — K08.89 PAIN, DENTAL: ICD-10-CM

## 2018-03-09 DIAGNOSIS — I10 ESSENTIAL HYPERTENSION WITH GOAL BLOOD PRESSURE LESS THAN 140/90: Primary | ICD-10-CM

## 2018-03-09 PROCEDURE — 99214 OFFICE O/P EST MOD 30 MIN: CPT | Performed by: FAMILY MEDICINE

## 2018-03-09 RX ORDER — CLINDAMYCIN HCL 150 MG
150 CAPSULE ORAL 2 TIMES DAILY
Qty: 14 CAPSULE | Refills: 0 | Status: SHIPPED | OUTPATIENT
Start: 2018-03-09 | End: 2019-02-12

## 2018-03-09 ASSESSMENT — PAIN SCALES - GENERAL: PAINLEVEL: MODERATE PAIN (5)

## 2018-03-09 NOTE — MR AVS SNAPSHOT
"              After Visit Summary   3/9/2018    Migdalia Escalera    MRN: [id number]           Patient Information     Date Of Birth          1955        Visit Information        Provider Department      3/9/2018 6:00 PM Kari Rm MD [facility]        Today's Diagnoses     Essential hypertension with goal blood pressure less than 140/90    -  1    Pain, dental           Follow-ups after your visit        Your next 10 appointments already scheduled     Mar 29, 2018  8:00 AM CDT   [facility] Kristen M Kehr, [facility]-C   [facility]    [address] [facility] [address] [phone]           Bring a current list of meds and any records pertaining to this visit. For Physicals, please bring immunization records and any forms needing to be filled out. Please arrive 10 minutes early to complete paperwork.              Who to contact     If you have questions or need follow up information about today's clinic visit or your schedule please contact [facility] directly at [phone].  Normal or non-critical lab and imaging results will be communicated to you by MyChart, letter or phone within 4 business days after the clinic has received the results. If you do not hear from us within 7 days, please contact the clinic through [employer] or phone. If you have a critical or abnormal lab result, we will notify you by phone as soon as possible.  Submit refill requests through [employer] or call your pharmacy and they will forward the refill request to us. Please allow 3 business days for your refill to be completed.          Additional Information About Your Visit        MyChart Information     [employer] lets you send messages to your doctor, view your test results, renew your prescriptions, schedule appointments and more. To sign up, go to [email or web address] . Click on \"Log in\" on the left side of the screen, which will take you to " "the Welcome page. Then click on \"Sign up Now\" on the right side of the page.     You will be asked to enter the access code listed below, as well as some personal information. Please follow the directions to create your username and password.     Your access code is: [email or web address]  Expires: 2018  7:26 PM     Your access code will  in 90 days. If you need help or a new code, please call your [facility] or [phone].        Care EveryWhere ID     This is your Care EveryWhere ID. This could be used by other organizations to access your [facility] medical records  [id number]        Your Vitals Were     Pulse Temperature Respirations Pulse Oximetry Breastfeeding? BMI (Body Mass Index)    75 98.3  F (36.8  C) (Oral) 16 97% No 37.94 kg/m2       Blood Pressure from Last 3 Encounters:   18 146/88   17 155/80   17 132/86    Weight from Last 3 Encounters:   18 228 lb (103.4 kg)   17 226 lb (102.5 kg)   17 225 lb (102.1 kg)              Today, you had the following     No orders found for display         Today's Medication Changes          These changes are accurate as of 3/9/18  7:26 PM.  If you have any questions, ask your nurse or doctor.               Start taking these medicines.        Dose/Directions    clindamycin 150 MG capsule   Commonly known as:  CLEOCIN   Used for:  Pain, dental   Started by:  Kari Rm MD        Dose:  150 mg   Take 1 capsule (150 mg) by mouth 2 times daily for 7 days   Quantity:  14 capsule   Refills:  0            Where to get your medicines      These medications were sent to [id number] - [place] - [address]  [address] [address]    Hours:  Same info as Marissa Cote Phone:  [phone]     clindamycin 150 MG capsule                Primary Care Provider Office Phone # Fax #    Kristen M Kehr, PA-C [phone] [address] [facility] [address]        Equal " Access to Services     [facility]: Hadii aad ku hadamy Valverde, wablancada luqadaha, qaybta katonyfanta hubbard. So [facility] [phone].    ATENCIÓN: Si habla raffi, tiene a cordova disposición servicios gratuitos de asistencia lingüística. Llame al [phone].    We comply with applicable federal civil rights laws and [place] laws. We do not discriminate on the basis of race, color, national origin, age, disability, sex, sexual orientation, or gender identity.            Thank you!     [facility]  [facility]. Our goal is always to provide you with excellent care. Hearing back from our patients is one way we can continue to improve our services. Please take a few minutes to complete the written survey that you may receive in the mail after your visit with us. Thank you!             Your Updated Medication List - Protect others around you: Learn how to safely use, store and throw away your medicines at www.disposemymeds.org.          This list is accurate as of 3/9/18  7:26 PM.  Always use your most recent med list.                   Brand Name Dispense Instructions for use Diagnosis    albuterol 108 (90 BASE) MCG/ACT Inhaler    PROAIR HFA/PROVENTIL HFA/VENTOLIN HFA    1 Inhaler    Inhale 2 puffs into the lungs every 4 hours as needed for shortness of breath / dyspnea or wheezing Use with spacer    Wheezing, SOB (shortness of breath)       aspirin 81 MG tablet      Take 1 tablet by mouth daily.        clindamycin 150 MG capsule    CLEOCIN    14 capsule    Take 1 capsule (150 mg) by mouth 2 times daily for 7 days    Pain, dental       furosemide 20 MG tablet    LASIX    30 tablet    Take 1 tablet (20 mg) by mouth daily    Bilateral edema of lower extremity       levothyroxine 137 MCG tablet    SYNTHROID/LEVOTHROID    90 tablet    Take 1 tablet (137 mcg) by mouth daily    Other specified hypothyroidism       losartan-hydrochlorothiazide  100-25 MG per tablet    HYZAAR    30 tablet    Take 1 tablet by mouth daily APPT NEEDED FOR FURTHER REFILLS    Essential hypertension with goal blood pressure less than 140/90       metoprolol succinate 100 MG 24 hr tablet    TOPROL-XL    30 tablet    Take 1 tablet (100 mg) by mouth daily APPT NEEDED FOR FURTHER REFILLS    Essential hypertension with goal blood pressure less than 140/90       order for DME     1 Units    Equipment being ordered: compression stockings / mild compression    Bilateral edema of lower extremity       silver sulfADIAZINE 1 % cream    SILVADENE    400 g    Apply topically 2 times daily    Burn of finger, right, unspecified degree, initial encounter       simvastatin 40 MG tablet    ZOCOR    90 tablet    Take 1 tablet (40 mg) by mouth At Bedtime    Hyperlipidemia LDL goal <130       spacer/aero-hold chamber mask Olya     1 each    1 Adult size spacer to use with MDI inhalers.    Wheezing, SOB (shortness of breath)

## 2018-03-10 ENCOUNTER — NURSE TRIAGE (OUTPATIENT)
Dept: NURSING | Facility: CLINIC | Age: 63
End: 2018-03-10

## 2018-03-10 NOTE — PROGRESS NOTES
Cc: both jaw pain more in the right side    2 days ago started having some discomfort in both jaw and both bottom teeth which also have been more sensitive to hot and cold.  Has throbbing ache  Today was starting to get worse in the right with some throbbing  Never happened to her in the past    No fevers or chills chest pain or shortness of breath   No runny nose no cough  Chewing has been causing some pain.      BP elevated today but asymptomatic. No headache no blurring of vision no chest pain no shortness of breath no dizziness no unsteadiness no numbness no weakness    Patient able to eat and drink and swallow.   Has tried OTC medications aspirin    Problem list, Medication list, Allergies, and Medical/Social/Surgical histories reviewed in [facility] and updated as appropriate.      ROS:  Constitutional: NO fevers  ENT: as above  No fevers or chills chest pain or shortness of breath      OBJECTIVE:   Blood pressure 146/88, pulse 75, temperature 98.3  F (36.8  C), temperature source Oral, resp. rate 16, weight 228 lb (103.4 kg), SpO2 97 %, not currently breastfeeding.  Eye exam : conjunctiva clear.  ENT: normal. No TMJ tenderness  Mouth:  Airway intact.    No swelling  Patient with pain on percussion on a tooth in the right lower area   no trismus.   NECK:  The neck is supple. No neck swelling. No facial swelling  Heart: Regular Rate and Rhythm, no murmurs, rubs or gallops  Lungs: Symmetrical Chest expansion, no retractions, clear breath sounds  Awake alert not in any acute cardiorespiratory distress  Psych: pleasant   Neurologic: No gross neurologic deficits  Skin: no obvious lesions or rashes        ASSESSMENT:      ICD-10-CM    1. Essential hypertension with goal blood pressure less than 140/90 I10    2. Pain, dental K08.89 clindamycin (CLEOCIN) 150 MG capsule           PLAN:  Suspect jaw pain is from dental pain - tooth sensitivity and pain with percussion.   Prescribed with clindamycin. Aware of risk of c.diff  with clindamycin. Aware to stop antibiotic immediately and come in to be seen if develop any diarrheal reaction. Alternative therapies were also offered and discussed  To cover any possible underlying pulpitis  Follow up with dentist  If dentist says non-dental, recommend follow up with primary care provider  Alarm signs or symptoms discussed, if present recommend go to ER   Adverse reactions to medications discussed  Aware to come back in if with worsening symptoms or concerns or no relief despite treatment plan  Please go to ER or come in to be seen immediately especially if with any difficulty swallowing or opening your mouth or worsening swelling.   Patient voiced understanding    Your blood pressure reading was elevated today.  Recommend that you have it re-checked either at home or at our clinic within a week  If your blood pressure top number (systolic) 180 and above, or the bottom number (diastolic) 120 and above, you need to be seen immediately.  If persistently elevated top number 140 and above, or the bottom number 90 and above, please schedule an appointment to see a provider in clinic within a week.  If you have very elevated blood pressures, accompanied by headache, chest pain, numbness, weakness, slurring of speech, confusion, difficulty walking, call 911 and go to the ER       Kari Rm MD

## 2018-03-10 NOTE — TELEPHONE ENCOUNTER
"Patient calling, was prescribed clindamycin for possible tooth infection yesterday. Read side effects and chance of diarrhea and caller is \"not comfortable\" with taking it. Reviewed patient's allergies and that this may be only option but offered to page provider to review. Patient states symptoms have not changed and they weren't even sure she had an infection. Spoke with Dr. Ocampo on call for the clinic and this antibiotic is best option so patient given option to wait to start it until she sees dentist or symptoms progress or to start it. Patient given this message and will wait for now. Reviewed signs of worsening infecion.   "

## 2018-03-28 NOTE — PROGRESS NOTES
"  SUBJECTIVE:   Migdalia Escalera is a 62 year old female who presents to clinic today for the following health issues:  {Provider please address medication reconciliation discrepancies--rooming staff please delete if no med/rec issues}    HPI  {additional problems for roomer to add, delete if none:226240}  Problem list and histories reviewed & adjusted, as indicated.  Additional history: {NONE - AS DOCUMENTED:105820::\"as documented\"}    {ACUTE Problem SUPERLIST - brief histories:902074}    {HIST REVIEW/ LINKS 2:656259}    {PROVIDER CHARTING PREFERENCE:850935}  "

## 2018-03-29 ENCOUNTER — OFFICE VISIT (OUTPATIENT)
Dept: FAMILY MEDICINE | Facility: CLINIC | Age: 63
End: 2018-03-29
Payer: COMMERCIAL

## 2018-03-29 VITALS
RESPIRATION RATE: 14 BRPM | OXYGEN SATURATION: 95 % | TEMPERATURE: 97.7 F | BODY MASS INDEX: 36.8 KG/M2 | SYSTOLIC BLOOD PRESSURE: 136 MMHG | HEIGHT: 66 IN | DIASTOLIC BLOOD PRESSURE: 82 MMHG | WEIGHT: 229 LBS | HEART RATE: 77 BPM

## 2018-03-29 DIAGNOSIS — E03.8 OTHER SPECIFIED HYPOTHYROIDISM: ICD-10-CM

## 2018-03-29 DIAGNOSIS — M54.9 BACK PAIN, UNSPECIFIED BACK LOCATION, UNSPECIFIED BACK PAIN LATERALITY, UNSPECIFIED CHRONICITY: Primary | ICD-10-CM

## 2018-03-29 DIAGNOSIS — E78.5 HYPERLIPIDEMIA LDL GOAL <130: ICD-10-CM

## 2018-03-29 DIAGNOSIS — I10 ESSENTIAL HYPERTENSION WITH GOAL BLOOD PRESSURE LESS THAN 140/90: ICD-10-CM

## 2018-03-29 DIAGNOSIS — N62 LARGE BREASTS: ICD-10-CM

## 2018-03-29 LAB
ALBUMIN SERPL-MCNC: 3.6 G/DL (ref 3.4–5)
ALP SERPL-CCNC: 89 U/L (ref 40–150)
ALT SERPL W P-5'-P-CCNC: 28 U/L (ref 0–50)
ANION GAP SERPL CALCULATED.3IONS-SCNC: 9 MMOL/L (ref 3–14)
AST SERPL W P-5'-P-CCNC: 25 U/L (ref 0–45)
BILIRUB SERPL-MCNC: 0.6 MG/DL (ref 0.2–1.3)
BUN SERPL-MCNC: 18 MG/DL (ref 7–30)
CALCIUM SERPL-MCNC: 9.2 MG/DL (ref 8.5–10.1)
CHLORIDE SERPL-SCNC: 104 MMOL/L (ref 94–109)
CHOLEST SERPL-MCNC: 203 MG/DL
CO2 SERPL-SCNC: 26 MMOL/L (ref 20–32)
CREAT SERPL-MCNC: 0.76 MG/DL (ref 0.52–1.04)
GFR SERPL CREATININE-BSD FRML MDRD: 76 ML/MIN/1.7M2
GLUCOSE SERPL-MCNC: 99 MG/DL (ref 70–99)
HDLC SERPL-MCNC: 75 MG/DL
LDLC SERPL CALC-MCNC: 109 MG/DL
NONHDLC SERPL-MCNC: 128 MG/DL
POTASSIUM SERPL-SCNC: 4.1 MMOL/L (ref 3.4–5.3)
PROT SERPL-MCNC: 7.4 G/DL (ref 6.8–8.8)
SODIUM SERPL-SCNC: 139 MMOL/L (ref 133–144)
TRIGL SERPL-MCNC: 93 MG/DL
TSH SERPL DL<=0.005 MIU/L-ACNC: 2.53 MU/L (ref 0.4–4)

## 2018-03-29 PROCEDURE — 36415 COLL VENOUS BLD VENIPUNCTURE: CPT | Performed by: PHYSICIAN ASSISTANT

## 2018-03-29 PROCEDURE — 80061 LIPID PANEL: CPT | Performed by: PHYSICIAN ASSISTANT

## 2018-03-29 PROCEDURE — 80053 COMPREHEN METABOLIC PANEL: CPT | Performed by: PHYSICIAN ASSISTANT

## 2018-03-29 PROCEDURE — 84443 ASSAY THYROID STIM HORMONE: CPT | Performed by: PHYSICIAN ASSISTANT

## 2018-03-29 PROCEDURE — 99214 OFFICE O/P EST MOD 30 MIN: CPT | Performed by: PHYSICIAN ASSISTANT

## 2018-03-29 RX ORDER — LOSARTAN POTASSIUM AND HYDROCHLOROTHIAZIDE 25; 100 MG/1; MG/1
1 TABLET ORAL DAILY
Qty: 90 TABLET | Refills: 1 | Status: SHIPPED | OUTPATIENT
Start: 2018-03-29 | End: 2018-11-04

## 2018-03-29 RX ORDER — SIMVASTATIN 40 MG
40 TABLET ORAL AT BEDTIME
Qty: 90 TABLET | Refills: 3 | Status: SHIPPED | OUTPATIENT
Start: 2018-03-29 | End: 2019-04-18

## 2018-03-29 RX ORDER — METOPROLOL SUCCINATE 100 MG/1
100 TABLET, EXTENDED RELEASE ORAL DAILY
Qty: 90 TABLET | Refills: 1 | Status: SHIPPED | OUTPATIENT
Start: 2018-03-29 | End: 2018-11-04

## 2018-03-29 RX ORDER — LEVOTHYROXINE SODIUM 137 UG/1
137 TABLET ORAL DAILY
Qty: 90 TABLET | Refills: 3 | Status: SHIPPED | OUTPATIENT
Start: 2018-03-29 | End: 2019-04-18

## 2018-03-29 ASSESSMENT — PAIN SCALES - GENERAL: PAINLEVEL: NO PAIN (0)

## 2018-03-29 NOTE — MR AVS SNAPSHOT
After Visit Summary   3/29/2018    Migdalia Escalera    MRN: [id number]           Patient Information     Date Of Birth          1955        Visit Information        Provider Department      3/29/2018 8:00 AM Kehr, Kristen M, PA-C [facility]        Today's Diagnoses     Back pain, unspecified back location, unspecified back pain laterality, unspecified chronicity    -  1    Essential hypertension with goal blood pressure less than 140/90        Hyperlipidemia LDL goal <130        Other specified hypothyroidism        Large breasts          Care Instructions    Follow up in 6 months for blood pressure check          Follow-ups after your visit        Additional Services     PLASTIC SURGERY REFERRAL       Your provider has referred you to: [facility]: [facility] [phone] [email or web address]: Plastic and Reconstructive Surgery [facility] [phone]   [email or web address]    Please be aware that coverage of these services is subject to the terms and limitations of your health insurance plan.  Call member services at your health plan with any benefit or coverage questions.      Please bring the following with you to your appointment:    (1) Any X-Rays, CTs or MRIs which have been performed.  Contact the facility where they were done to arrange for  prior to your scheduled appointment.    (2) List of current medications  (3) This referral request   (4) Any documents/labs given to you for this referral                  Who to contact     If you have questions or need follow up information about today's clinic visit or your schedule please contact [facility] directly at [phone].  Normal or non-critical lab and imaging results will be communicated to you  "by Neonode[employer], letter or phone within 4 business days after the clinic has received the results. If you do not hear from us within 7 days, please contact the clinic through [employer] or phone. If you have a critical or abnormal lab result, we will notify you by phone as soon as possible.  Submit refill requests through [employer] or call your pharmacy and they will forward the refill request to us. Please allow 3 business days for your refill to be completed.          Additional Information About Your Visit        [employer] Information     [employer] lets you send messages to your doctor, view your test results, renew your prescriptions, schedule appointments and more. To sign up, go to [email or web address] . Click on \"Log in\" on the left side of the screen, which will take you to the Welcome page. Then click on \"Sign up Now\" on the right side of the page.     You will be asked to enter the access code listed below, as well as some personal information. Please follow the directions to create your username and password.     Your access code is: [email or web address]  Expires: 2018  8:26 PM     Your access code will  in 90 days. If you need help or a new code, please call your [facility] clinic or [phone].        Care EveryWhere ID     This is your Care EveryWhere ID. This could be used by other organizations to access your [facility] medical records  [id number]        Your Vitals Were     Pulse Temperature Respirations Height Pulse Oximetry BMI (Body Mass Index)    77 97.7  F (36.5  C) (Oral) 14 5' 5.75\" (1.67 m) 95% 37.24 kg/m2       Blood Pressure from Last 3 Encounters:   18 136/82   18 146/88   17 155/80    Weight from Last 3 Encounters:   18 229 lb (103.9 kg)   18 228 lb (103.4 kg)   17 226 lb (102.5 kg)              We Performed the Following     COMPREHENSIVE METABOLIC PANEL     Lipid panel reflex to direct LDL Fasting     PLASTIC SURGERY REFERRAL     TSH with free T4 reflex     "      Where to get your medicines      These medications were sent to [id number] - [place], [place] - [address]  [address], [place] [address]    Hours:  Same info as Marissa Cote Phone:  [phone]     levothyroxine 137 MCG tablet    losartan-hydrochlorothiazide 100-25 MG per tablet    metoprolol succinate 100 MG 24 hr tablet    simvastatin 40 MG tablet          Primary Care Provider Office Phone # Fax #    Kristen M Kehr, PA-C [phone] [address] [facility] [address]        Equal Access to Services     [facility]: Hadii aad ku hadasho Soomaali, waaxda luqadaha, qaybta kaalmada adeegyada, waxay idiin hayaan adeeg khraine yeager . So [facility] [phone].    ATENCIÓN: Si habla español, tiene a cordova disposición servicios gratuitos de asistencia lingüística. [facility] [phone].    We comply with applicable federal civil rights laws and [place] laws. We do not discriminate on the basis of race, color, national origin, age, disability, sex, sexual orientation, or gender identity.            Thank you!     Thank you for choosing [facility]  for your care. Our goal is always to provide you with excellent care. Hearing back from our patients is one way we can continue to improve our services. Please take a few minutes to complete the written survey that you may receive in the mail after your visit with us. Thank you!             Your Updated Medication List - Protect others around you: Learn how to safely use, store and throw away your medicines at www.disposemymeds.org.          This list is accurate as of 3/29/18  8:19 AM.  Always use your most recent med list.                   Brand Name Dispense Instructions for use Diagnosis    albuterol 108 (90 BASE) MCG/ACT Inhaler    PROAIR HFA/PROVENTIL HFA/VENTOLIN HFA    1 Inhaler    Inhale 2 puffs into the lungs every 4 hours as needed for shortness of breath / dyspnea or wheezing Use with spacer     Wheezing, SOB (shortness of breath)       aspirin 81 MG tablet      Take 1 tablet by mouth daily.        furosemide 20 MG tablet    LASIX    30 tablet    Take 1 tablet (20 mg) by mouth daily    Bilateral edema of lower extremity       levothyroxine 137 MCG tablet    SYNTHROID/LEVOTHROID    90 tablet    Take 1 tablet (137 mcg) by mouth daily    Other specified hypothyroidism       losartan-hydrochlorothiazide 100-25 MG per tablet    HYZAAR    90 tablet    Take 1 tablet by mouth daily APPT NEEDED FOR FURTHER REFILLS    Essential hypertension with goal blood pressure less than 140/90       metoprolol succinate 100 MG 24 hr tablet    TOPROL-XL    90 tablet    Take 1 tablet (100 mg) by mouth daily APPT NEEDED FOR FURTHER REFILLS    Essential hypertension with goal blood pressure less than 140/90       order for DME     1 Units    Equipment being ordered: compression stockings / mild compression    Bilateral edema of lower extremity       silver sulfADIAZINE 1 % cream    SILVADENE    400 g    Apply topically 2 times daily    Burn of finger, right, unspecified degree, initial encounter       simvastatin 40 MG tablet    ZOCOR    90 tablet    Take 1 tablet (40 mg) by mouth At Bedtime    Hyperlipidemia LDL goal <130       spacer/aero-hold chamber mask Olya     1 each    1 Adult size spacer to use with MDI inhalers.    Wheezing, SOB (shortness of breath)

## 2018-03-29 NOTE — LETTER
March 29, 2018    Migdalia Escalera  [address] [place] [id number]            Dear Migdalia,    The results of your recent tests were normal.  Below is a copy of the results.  It was a pleasure to see you at your last appointment.    If you have any questions or concerns, please call myself or my nurse at [phone].    Sincerely,    Kristen Kehr, PA-C /kvng    Results for orders placed or performed in visit on 03/29/18   COMPREHENSIVE METABOLIC PANEL   Result Value Ref Range    Sodium 139 133 - 144 mmol/L    Potassium 4.1 3.4 - 5.3 mmol/L    Chloride 104 94 - 109 mmol/L    Carbon Dioxide 26 20 - 32 mmol/L    Anion Gap 9 3 - 14 mmol/L    Glucose 99 70 - 99 mg/dL    Urea Nitrogen 18 7 - 30 mg/dL    Creatinine 0.76 0.52 - 1.04 mg/dL    GFR Estimate 76 >60 mL/min/1.7m2    GFR Estimate If Black >90 >60 mL/min/1.7m2    Calcium 9.2 8.5 - 10.1 mg/dL    Bilirubin Total 0.6 0.2 - 1.3 mg/dL    Albumin 3.6 3.4 - 5.0 g/dL    Protein Total 7.4 6.8 - 8.8 g/dL    Alkaline Phosphatase 89 40 - 150 U/L    ALT 28 0 - 50 U/L    AST 25 0 - 45 U/L   Lipid panel reflex to direct LDL Fasting   Result Value Ref Range    Cholesterol 203 (H) <200 mg/dL    Triglycerides 93 <150 mg/dL    HDL Cholesterol 75 >49 mg/dL    LDL Cholesterol Calculated 109 (H) <100 mg/dL    Non HDL Cholesterol 128 <130 mg/dL   TSH with free T4 reflex   Result Value Ref Range    TSH 2.53 0.40 - 4.00 mU/L

## 2018-03-29 NOTE — PROGRESS NOTES
SUBJECTIVE:   Migdalia Escalera is a 62 year old female who presents to clinic today for the following health issues:      History of Present Illness     Hyperlipidemia:     Low fat/chol diet rating::  Fair    Taking Statins::  YES    Lipid Medications or Supplements::  None    Hypertension:     Outpatient blood pressures:  Are not being checked    Dietary sodium intake::  Low salt diet    Hypothyroidism:     Since last visit, patient describes the following symptoms::  None    Diet:  Regular (no restrictions) and Low salt  Frequency of exercise:  4-5 days/week  Duration of exercise:  45-60 minutes  Taking medications regularly:  Yes  Medication side effects:  None  Additional concerns today:  YES    PROBLEMS TO ADD ON...  She is also interested in discussing referral for consultation for breast reduction. She has large breasts and has back pain. She has coped for years and just started contemplating reduction and did not know where to start the process.     Problem list and histories reviewed & adjusted, as indicated.  Additional history: as documented      Patient Active Problem List   Diagnosis     Hyperlipidemia LDL goal <130     Advanced directives, counseling/discussion     H/O: hysterectomy     Vitamin D deficiency     Hypothyroidism, unspecified hypothyroidism type     History of colonic polyps     Essential hypertension with goal blood pressure less than 140/90     Past Surgical History:   Procedure Laterality Date     HYSTERECTOMY, PAP NO LONGER INDICATED       SURGICAL HISTORY OF -       thoracic spine       Social History   Substance Use Topics     Smoking status: Former Smoker     Types: Cigarettes     Quit date: 12/1/2005     Smokeless tobacco: Never Used      Comment: no 2nd hand smoke exposure     Alcohol use Yes      Comment: occ.     Family History   Problem Relation Age of Onset     Arthritis Mother      Prostate Cancer Father      Hypertension Father      CANCER Father      bone     DIABETES  Maternal Grandfather      Thyroid Disease Sister          Current Outpatient Prescriptions   Medication Sig Dispense Refill     losartan-hydrochlorothiazide (HYZAAR) 100-25 MG per tablet Take 1 tablet by mouth daily APPT NEEDED FOR FURTHER REFILLS 90 tablet 1     metoprolol succinate (TOPROL-XL) 100 MG 24 hr tablet Take 1 tablet (100 mg) by mouth daily APPT NEEDED FOR FURTHER REFILLS 90 tablet 1     simvastatin (ZOCOR) 40 MG tablet Take 1 tablet (40 mg) by mouth At Bedtime 90 tablet 3     levothyroxine (SYNTHROID/LEVOTHROID) 137 MCG tablet Take 1 tablet (137 mcg) by mouth daily 90 tablet 3     Spacer/Aero-Holding Chambers (SPACER/AERO-HOLD CHAMBER MASK) KOTA 1 Adult size spacer to use with MDI inhalers. 1 each 0     albuterol (PROAIR HFA, PROVENTIL HFA, VENTOLIN HFA) 108 (90 BASE) MCG/ACT inhaler Inhale 2 puffs into the lungs every 4 hours as needed for shortness of breath / dyspnea or wheezing Use with spacer 1 Inhaler 0     order for DME Equipment being ordered: compression stockings / mild compression 1 Units 3     furosemide (LASIX) 20 MG tablet Take 1 tablet (20 mg) by mouth daily 30 tablet 1     silver sulfADIAZINE (SILVADENE) 1 % cream Apply topically 2 times daily 400 g 0     aspirin 81 MG tablet Take 1 tablet by mouth daily.       [DISCONTINUED] losartan-hydrochlorothiazide (HYZAAR) 100-25 MG per tablet Take 1 tablet by mouth daily APPT NEEDED FOR FURTHER REFILLS 30 tablet 0     [DISCONTINUED] metoprolol succinate (TOPROL-XL) 100 MG 24 hr tablet Take 1 tablet (100 mg) by mouth daily APPT NEEDED FOR FURTHER REFILLS 30 tablet 0     [DISCONTINUED] simvastatin (ZOCOR) 40 MG tablet Take 1 tablet (40 mg) by mouth At Bedtime 90 tablet 3     [DISCONTINUED] levothyroxine (SYNTHROID/LEVOTHROID) 137 MCG tablet Take 1 tablet (137 mcg) by mouth daily 90 tablet 3     Allergies   Allergen Reactions     Amoxicillin      Erythromycin        ROS:  Constitutional, HEENT, cardiovascular, pulmonary, gi and gu systems are  "negative, except as otherwise noted.    OBJECTIVE:     /82  Pulse 77  Temp 97.7  F (36.5  C) (Oral)  Resp 14  Ht 5' 5.75\" (1.67 m)  Wt 229 lb (103.9 kg)  SpO2 95%  BMI 37.24 kg/m2  Body mass index is 37.24 kg/(m^2).  GENERAL: healthy, alert and no distress  NECK: no adenopathy, no asymmetry, masses, or scars and thyroid normal to palpation  RESP: lungs clear to auscultation - no rales, rhonchi or wheezes  CV: regular rate and rhythm, normal S1 S2, no S3 or S4, no murmur, click or rub, no peripheral edema and peripheral pulses strong  MS: no gross musculoskeletal defects noted, no edema  SKIN: no suspicious lesions or rashes  NEURO: Normal strength and tone, mentation intact and speech normal  PSYCH: mentation appears normal, affect normal/bright    Diagnostic Test Results:  none     ASSESSMENT/PLAN:       1. Essential hypertension with goal blood pressure less than 140/90  Refills given x 6 months.   Plan recheck at the pharmacy or ancillary nursing at 6 months.   - COMPREHENSIVE METABOLIC PANEL  - losartan-hydrochlorothiazide (HYZAAR) 100-25 MG per tablet; Take 1 tablet by mouth daily APPT NEEDED FOR FURTHER REFILLS  Dispense: 90 tablet; Refill: 1  - metoprolol succinate (TOPROL-XL) 100 MG 24 hr tablet; Take 1 tablet (100 mg) by mouth daily APPT NEEDED FOR FURTHER REFILLS  Dispense: 90 tablet; Refill: 1    2. Hyperlipidemia LDL goal <130  Lipids done today.   Refills given  - simvastatin (ZOCOR) 40 MG tablet; Take 1 tablet (40 mg) by mouth At Bedtime  Dispense: 90 tablet; Refill: 3  - Lipid panel reflex to direct LDL Fasting    3. Other specified hypothyroidism  TSH done today.   Plan refill as soon as lab tests are back.  - levothyroxine (SYNTHROID/LEVOTHROID) 137 MCG tablet; Take 1 tablet (137 mcg) by mouth daily  Dispense: 90 tablet; Refill: 3  - TSH with free T4 reflex    4. Back pain, unspecified back location, unspecified back pain laterality, unspecified chronicity  5. Large breasts  Back pain " caused by large breasts.   She was given contact information for consultation with Plastic Surgery  - PLASTIC SURGERY REFERRAL      Kristen M. Kehr, PA-C  [facility]

## 2018-03-29 NOTE — NURSING NOTE
"Chief Complaint   Patient presents with     Hypertension     Lipids     Thyroid Disease       Initial /82  Pulse 77  Temp 97.7  F (36.5  C) (Oral)  Resp 14  Ht 5' 5.75\" (1.67 m)  Wt 229 lb (103.9 kg)  SpO2 95%  BMI 37.24 kg/m2 Estimated body mass index is 37.24 kg/(m^2) as calculated from the following:    Height as of this encounter: 5' 5.75\" (1.67 m).    Weight as of this encounter: 229 lb (103.9 kg).  Medication Reconciliation: complete    HORTENCIA Garcia MA    "

## 2018-04-23 ENCOUNTER — TELEPHONE (OUTPATIENT)
Dept: SURGERY | Facility: CLINIC | Age: 63
End: 2018-04-23

## 2018-04-23 NOTE — TELEPHONE ENCOUNTER
PREVISIT INFORMATION                                                    Iggyn L Jw scheduled for future visit at [facility] specialty clinics.    Patient is scheduled to see Dr. Diaz on 4/27/18  Reason for visit: Consult for breast reduction  Referring provider Kristen Kehr, PA-C  Has patient seen previous specialist? No  Medical Records:  Available in chart.  Patient was previously seen at a [facility] or [facility] facility.    REVIEW                                                      New patient packet mailed to patient: No  Medication reconciliation complete: No      Current Outpatient Prescriptions   Medication Sig Dispense Refill     albuterol (PROAIR HFA, PROVENTIL HFA, VENTOLIN HFA) 108 (90 BASE) MCG/ACT inhaler Inhale 2 puffs into the lungs every 4 hours as needed for shortness of breath / dyspnea or wheezing Use with spacer 1 Inhaler 0     aspirin 81 MG tablet Take 1 tablet by mouth daily.       furosemide (LASIX) 20 MG tablet Take 1 tablet (20 mg) by mouth daily 30 tablet 1     levothyroxine (SYNTHROID/LEVOTHROID) 137 MCG tablet Take 1 tablet (137 mcg) by mouth daily 90 tablet 3     losartan-hydrochlorothiazide (HYZAAR) 100-25 MG per tablet Take 1 tablet by mouth daily APPT NEEDED FOR FURTHER REFILLS 90 tablet 1     metoprolol succinate (TOPROL-XL) 100 MG 24 hr tablet Take 1 tablet (100 mg) by mouth daily APPT NEEDED FOR FURTHER REFILLS 90 tablet 1     order for DME Equipment being ordered: compression stockings / mild compression 1 Units 3     silver sulfADIAZINE (SILVADENE) 1 % cream Apply topically 2 times daily 400 g 0     simvastatin (ZOCOR) 40 MG tablet Take 1 tablet (40 mg) by mouth At Bedtime 90 tablet 3     Spacer/Aero-Holding Chambers (SPACER/AERO-HOLD CHAMBER MASK) KOTA 1 Adult size spacer to use with MDI inhalers. 1 each 0       Allergies: Amoxicillin and Erythromycin        PLAN/FOLLOW-UP NEEDED                                                       Previsit review complete.  Patient will see provider at future scheduled appointment.     Writer left message with appointment reminder.    Patient Reminders Given:  Please, make sure you bring an updated list of your medications.   If you are having a procedure, please, present 15 minutes early.  If you need to cancel or reschedule,please call [phone].    KULWINDER Jolly

## 2018-04-27 ENCOUNTER — OFFICE VISIT (OUTPATIENT)
Dept: SURGERY | Facility: CLINIC | Age: 63
End: 2018-04-27
Attending: PHYSICIAN ASSISTANT
Payer: COMMERCIAL

## 2018-04-27 DIAGNOSIS — N62 BREAST HYPERTROPHY: Primary | ICD-10-CM

## 2018-04-27 PROCEDURE — 99203 OFFICE O/P NEW LOW 30 MIN: CPT | Performed by: PLASTIC SURGERY

## 2018-04-27 NOTE — NURSING NOTE
"Migdalia Escalera's goals for this visit include:   Chief Complaint   Patient presents with     Consult     breast reduction - back pain and large lump on left shoulder from bra strap       She requests these members of at her care team be copied on today's visit    Initial Vitals: There were no vitals taken for this visit. Estimated body mass index is 37.24 kg/(m^2) as calculated from the following:    Height as of 3/29/18: 1.67 m (5' 5.75\").    Weight as of 3/29/18: 103.9 kg (229 lb).. BP completed using cuff size :NA (Not Taken)  PCP: Kehr, Kristen M    Referring Provider  Kristen M Kehr, PA-C  [address] [place] [address]    Do you need refills at today's visit? CHARLES Powell CMA    "

## 2018-04-27 NOTE — LETTER
4/27/2018         RE: Migdalia Escalera  [address] [facility] [address]        Dear Colleague,    Thank you for referring your patient, Migdalia Escalera, to the [facility]. Please see a copy of my visit note below.    REFERRING PHYSICIAN:  Kristen Kehr, PA-C, [facility].      PRESENTING COMPLAINT:  Consultation for breast reduction.      HISTORY OF PRESENTING COMPLAINT:  Ms. Escalera is 63 years old.  She has been thinking about a breast reduction.  She feels that she is very large-breasted, has a lot of upper back, neck, shoulder pain, shoulder grooving from bra straps, has used conservative means to help with the issues including supportive garments and over-the-counter pain medications but continues to have a lot of issues.  She has been referred to me for my recommendations regarding a breast reduction.  Her last mammogram was over 2 years ago.  They have been normal.  No personal or family history of breast cancer.  She wears a DD bra.  She would like to be around a C or a D cup.      PAST MEDICAL HISTORY:  Hypothyroidism and hypertension.      PAST SURGICAL HISTORY:  Hysterectomy and history of some pathology removed from her ribs posteriorly.      MEDICATIONS:  Lasix, levothyroxine, losartan, hydrochlorothiazide, metoprolol, simvastatin.      ALLERGIES:  Amoxicillin -- hives.      SOCIAL HISTORY:  Used to smoke 1-2 packs a day for about 20 years, quit in 2005.  Drinks a beer daily.  Lives in [place], sells car parts.      REVIEW OF SYSTEMS:  Denies chest pain, has some shortness of breath (has been worked up, had a stress test done in 2017 which was normal).  No MI, CVA, DVT or PE.      PHYSICAL EXAMINATION:  Vital signs are stable.  She is afebrile, in no distress.  She is 5 feet 7 inches, 225 pounds, BMI of about 36 kg/m2 and body surface area of 2.15 m2.  On examination of her breasts, she has grade 2/3 ptosis, both breasts is relatively symmetric.  No palpable masses.       Sternal notch to nipple distance under 40 cm.      ASSESSMENT AND PLAN:  Based upon above findings, a diagnosis of symptomatic bilateral breast hypertrophy was made.  I had a long discussion with the patient about breast reduction.  I explained to her how breast reduction was done, showed her where the scars would be and explained to her expectations of the surgery.  I explained to her the realities of insurance companies and requirement of prior authorization.  Based on her Schnur scale, 820 g needs to be removed from each breast.  Her breasts individually weigh probably between 800-1000 grams.  Therefore, I cannot remove the amount the insurance companies want and give her what she wants as an end result.  I explained to the patient that our options include doing nothing surgically versus private pay versus losing a considerable amount of weight to get her Schnur scale down.  The patient wants a quote sent to her.  She will decide how she wants to proceed.  She will need a mammogram before any breast surgery is done.  She understood everything.  All exam was done in the presence of my nurse.  She was happy with the visit.  I will see her back as indicated.        Total time spent with patient was 30 minutes, more than half was counseling.      cc:   Kristen Kehr, PA-C   [facility]    [address] [place]  [address]         Again, thank you for allowing me to participate in the care of your patient.        Sincerely,        ALEC Diaz MD

## 2018-04-27 NOTE — NURSING NOTE
Per  pt to be given quote for a tier 3 Mastopexy 2 hours with General anesthesia. Mastopexy tier 3 cost is 5,000.00 and ASC fee for two hours is 2,434.00 which brings the total quote to 7,434.00. Pt given copy of quote and copy brought up to  to be scanned. Pt advised to call with any questions...Sharon Griffin RN

## 2018-04-27 NOTE — PROGRESS NOTES
REFERRING PHYSICIAN:  Kristen Kehr, PA-C, Bob Niño.      PRESENTING COMPLAINT:  Consultation for breast reduction.      HISTORY OF PRESENTING COMPLAINT:  Ms. Escalera is 63 years old.  She has been thinking about a breast reduction.  She feels that she is very large-breasted, has a lot of upper back, neck, shoulder pain, shoulder grooving from bra straps, has used conservative means to help with the issues including supportive garments and over-the-counter pain medications but continues to have a lot of issues.  She has been referred to me for my recommendations regarding a breast reduction.  Her last mammogram was over 2 years ago.  They have been normal.  No personal or family history of breast cancer.  She wears a DD bra.  She would like to be around a C or a D cup.      PAST MEDICAL HISTORY:  Hypothyroidism and hypertension.      PAST SURGICAL HISTORY:  Hysterectomy and history of some pathology removed from her ribs posteriorly.      MEDICATIONS:  Lasix, levothyroxine, losartan, hydrochlorothiazide, metoprolol, simvastatin.      ALLERGIES:  Amoxicillin -- hives.      SOCIAL HISTORY:  Used to smoke 1-2 packs a day for about 20 years, quit in 2005.  Drinks a beer daily.  Lives in [place], sells car parts.      REVIEW OF SYSTEMS:  Denies chest pain, has some shortness of breath (has been worked up, had a stress test done in 2017 which was normal).  No MI, CVA, DVT or PE.      PHYSICAL EXAMINATION:  Vital signs are stable.  She is afebrile, in no distress.  She is 5 feet 7 inches, 225 pounds, BMI of about 36 kg/m2 and body surface area of 2.15 m2.  On examination of her breasts, she has grade 2/3 ptosis, both breasts is relatively symmetric.  No palpable masses.      Sternal notch to nipple distance under 40 cm.      ASSESSMENT AND PLAN:  Based upon above findings, a diagnosis of symptomatic bilateral breast hypertrophy was made.  I had a long discussion with the patient about breast reduction.  I explained  to her how breast reduction was done, showed her where the scars would be and explained to her expectations of the surgery.  I explained to her the realities of insurance companies and requirement of prior authorization.  Based on her Schnur scale, 820 g needs to be removed from each breast.  Her breasts individually weigh probably between 800-1000 grams.  Therefore, I cannot remove the amount the insurance companies want and give her what she wants as an end result.  I explained to the patient that our options include doing nothing surgically versus private pay versus losing a considerable amount of weight to get her Schnur scale down.  The patient wants a quote sent to her.  She will decide how she wants to proceed.  She will need a mammogram before any breast surgery is done.  She understood everything.  All exam was done in the presence of my nurse.  She was happy with the visit.  I will see her back as indicated.        Total time spent with patient was 30 minutes, more than half was counseling.      cc:   Kristen Kehr, PA-C   [facility]    [address] [place]  [address]

## 2018-05-18 ENCOUNTER — OFFICE VISIT (OUTPATIENT)
Dept: URGENT CARE | Facility: URGENT CARE | Age: 63
End: 2018-05-18
Payer: COMMERCIAL

## 2018-05-18 VITALS
DIASTOLIC BLOOD PRESSURE: 82 MMHG | OXYGEN SATURATION: 100 % | RESPIRATION RATE: 16 BRPM | BODY MASS INDEX: 36.79 KG/M2 | TEMPERATURE: 97.5 F | WEIGHT: 226.2 LBS | SYSTOLIC BLOOD PRESSURE: 171 MMHG | HEART RATE: 71 BPM

## 2018-05-18 DIAGNOSIS — B00.1 COLD SORE: Primary | ICD-10-CM

## 2018-05-18 PROCEDURE — 99213 OFFICE O/P EST LOW 20 MIN: CPT | Performed by: FAMILY MEDICINE

## 2018-05-18 RX ORDER — VALACYCLOVIR HYDROCHLORIDE 1 G/1
2000 TABLET, FILM COATED ORAL 2 TIMES DAILY
Qty: 4 TABLET | Refills: 0 | Status: SHIPPED | OUTPATIENT
Start: 2018-05-18 | End: 2018-12-13

## 2018-05-18 NOTE — PATIENT INSTRUCTIONS
Understanding Cold Sores  Cold sores are small blisters or sores on the lip or sometimes inside the mouth. Many people get them from time to time. Cold sores usually are not serious, and they usually heal in a week or two. They are caused by 2 related viruses, herpes simplex type 1 and 2. These viruses spread very easily. Many people have one or both of these viruses in their body. More than 4 in every 5 people are infected with herpes simplex type 1. Once you have the virus that causes cold sores, it stays in your body for the rest of your life. But it can be inactive for long periods.  What causes a cold sore?  Cold sores are usually caused by herpes simplex virus type 1. Less often, they are caused by herpes simplex virus type 2. Herpes simplex virus type 2 is the more common cause of genital sores. The herpes viruses can enter the body through a break in the skin such as a scrape. Or they may enter through mucous membranes such as the lips or mouth. Some ways to get the viruses include:    Kissing someone who has a cold sore    Sharing a drinking glass, eating utensils, or lip balm with someone who has a cold sore    Having oral sex with someone who has a cold sore  A  baby can also get the infection at birth.  If you have a herpes virus, you can to pass it along even when you don t have a sore.  Cold sores flare up occasionally. Things that can cause an outbreak include:    Sun exposure    Fever    Stress or exhaustion    Menstruation    Skin irritation    Another unrelated Illness such as pneumonia, urinary infection, or cancer  What are the symptoms of a cold sore?  Symptoms can include:    A blister-like sore or cluster of sores. These often occur at the edge of the lips but may appear inside the mouth.    Skin redness around the sores.    Pain or itching in the area of the outbreak. Often the pain or itching develops 12 to 24 hours before the sore become visible.    Flu-like symptoms, including  swollen glands, headache, body ache, or fever. These typically occur only at the time of the first infection.  Cold sores may also occur on fingers. They may rarely infect the eyes, a serious possible complication.  Some people have symptoms a day or two before an outbreak. They may feel tenderness, burning, itching, or tingling before a cold sore appears. Cold sores tend to come back in the same area that they first appeared.  How are cold sores treated?  Treatment for cold sores focuses on relieving and shortening symptoms. For people with frequent outbreaks, treatment works to decrease how often future episodes.  Treatments may include:    Prescription or over-the-counter pain medicines. These can help with discomfort, especially if sores are inside the mouth.    Antiviral medicines. These may be pills that are taken by mouth or a cream to apply to sores. They may help shorten an outbreak and reduce the severity of symptoms.  They may be used to help prevent future outbreaks if you have disabling recurrent infections.    Self-care such as extra rest and drinking more fluids. These may help relieve the flu-like symptoms of a first outbreak.  How are cold sores diagnosed?  Your healthcare provider makes the diagnosis mainly by looking at the sores and doing a clinical exam.  This may be confirmed by swab tests or blood tests.  How can I prevent cold sores?  You can help reduce the spread of the herpes viruses that cause cold sores. This can help both you and others avoid getting cold sores. Follow these tips:    Do not kiss others if you have a cold sore. Also avoid kissing someone with a cold sore.    Do not share eating utensils, lip balm, razors, or towels with someone who has a cold sore.    Wash your hands after touching the area of a cold sore. The herpes virus can be carried from your face to your hands when you touch the area of a cold sore. When this happens, wash your hands thoroughly, for at least 20  seconds. When you can t wash with soap and water, use an alcohol-based hand .    Disinfect things you touch often, such as phones and keyboards.      If you feel a cold sore coming on, do the same things you would do when a cold sore is present to avoid spreading the virus.    Use condoms to help prevent passing on the viruses through sex.  What are the possible complications of a cold sore?  Cold sores usually go away by themselves within 2 weeks. For most people cold sores are not serious. The viruses that cause cold sores can cause more serious illness, though. People who have a weak immune system may get more serious infections from herpes viruses. These include people being treated for cancer or who have HIV disease. Babies may also become very ill from a herpes infection.      When should I call my healthcare provider?  Call your healthcare provider right away if you have any of these:    Fever of 100.4 F (38 C) or higher, or as directed    Pain that gets worse    You cannot eat or drink because of painful sores    Symptoms don t get better within 5 to 7 days    Blisters spread beyond the mouth or lip to areas on the chest, arms, face, or legs   Date Last Reviewed: 3/28/2016    1859-8243 [employer]. [address] [place] [address], [place] [address]. All rights reserved. This information is not intended as a substitute for professional medical care. Always follow your healthcare professional's instructions.

## 2018-05-18 NOTE — MR AVS SNAPSHOT
After Visit Summary   2018    Migdalia sEcalera    MRN: [id number]           Patient Information     Date Of Birth          1955        Visit Information        Provider Department      2018 6:05 PM Renato Rai MD [facility]        Today's Diagnoses     Cold sore    -  1      Care Instructions      Understanding Cold Sores  Cold sores are small blisters or sores on the lip or sometimes inside the mouth. Many people get them from time to time. Cold sores usually are not serious, and they usually heal in a week or two. They are caused by 2 related viruses, herpes simplex type 1 and 2. These viruses spread very easily. Many people have one or both of these viruses in their body. More than 4 in every 5 people are infected with herpes simplex type 1. Once you have the virus that causes cold sores, it stays in your body for the rest of your life. But it can be inactive for long periods.  What causes a cold sore?  Cold sores are usually caused by herpes simplex virus type 1. Less often, they are caused by herpes simplex virus type 2. Herpes simplex virus type 2 is the more common cause of genital sores. The herpes viruses can enter the body through a break in the skin such as a scrape. Or they may enter through mucous membranes such as the lips or mouth. Some ways to get the viruses include:    Kissing someone who has a cold sore    Sharing a drinking glass, eating utensils, or lip balm with someone who has a cold sore    Having oral sex with someone who has a cold sore  A  baby can also get the infection at birth.  If you have a herpes virus, you can to pass it along even when you don t have a sore.  Cold sores flare up occasionally. Things that can cause an outbreak include:    Sun exposure    Fever    Stress or exhaustion    Menstruation    Skin irritation    Another unrelated Illness such as pneumonia, urinary infection, or cancer  What are the symptoms of a cold  sore?  Symptoms can include:    A blister-like sore or cluster of sores. These often occur at the edge of the lips but may appear inside the mouth.    Skin redness around the sores.    Pain or itching in the area of the outbreak. Often the pain or itching develops 12 to 24 hours before the sore become visible.    Flu-like symptoms, including swollen glands, headache, body ache, or fever. These typically occur only at the time of the first infection.  Cold sores may also occur on fingers. They may rarely infect the eyes, a serious possible complication.  Some people have symptoms a day or two before an outbreak. They may feel tenderness, burning, itching, or tingling before a cold sore appears. Cold sores tend to come back in the same area that they first appeared.  How are cold sores treated?  Treatment for cold sores focuses on relieving and shortening symptoms. For people with frequent outbreaks, treatment works to decrease how often future episodes.  Treatments may include:    Prescription or over-the-counter pain medicines. These can help with discomfort, especially if sores are inside the mouth.    Antiviral medicines. These may be pills that are taken by mouth or a cream to apply to sores. They may help shorten an outbreak and reduce the severity of symptoms.  They may be used to help prevent future outbreaks if you have disabling recurrent infections.    Self-care such as extra rest and drinking more fluids. These may help relieve the flu-like symptoms of a first outbreak.  How are cold sores diagnosed?  Your healthcare provider makes the diagnosis mainly by looking at the sores and doing a clinical exam.  This may be confirmed by swab tests or blood tests.  How can I prevent cold sores?  You can help reduce the spread of the herpes viruses that cause cold sores. This can help both you and others avoid getting cold sores. Follow these tips:    Do not kiss others if you have a cold sore. Also avoid kissing  someone with a cold sore.    Do not share eating utensils, lip balm, razors, or towels with someone who has a cold sore.    Wash your hands after touching the area of a cold sore. The herpes virus can be carried from your face to your hands when you touch the area of a cold sore. When this happens, wash your hands thoroughly, for at least 20 seconds. When you can t wash with soap and water, use an alcohol-based hand .    Disinfect things you touch often, such as phones and keyboards.      If you feel a cold sore coming on, do the same things you would do when a cold sore is present to avoid spreading the virus.    Use condoms to help prevent passing on the viruses through sex.  What are the possible complications of a cold sore?  Cold sores usually go away by themselves within 2 weeks. For most people cold sores are not serious. The viruses that cause cold sores can cause more serious illness, though. People who have a weak immune system may get more serious infections from herpes viruses. These include people being treated for cancer or who have HIV disease. Babies may also become very ill from a herpes infection.      When should I call my healthcare provider?  Call your healthcare provider right away if you have any of these:    Fever of 100.4 F (38 C) or higher, or as directed    Pain that gets worse    You cannot eat or drink because of painful sores    Symptoms don t get better within 5 to 7 days    Blisters spread beyond the mouth or lip to areas on the chest, arms, face, or legs   Date Last Reviewed: 3/28/2016    4607-9079 [employer]. [address] [place] [address]. All rights reserved. This information is not intended as a substitute for professional medical care. Always follow your healthcare professional's instructions.                Follow-ups after your visit        Who to contact     If you have questions or need follow up information about today's clinic visit or  "your schedule please contact [facility] directly at [phone].  Normal or non-critical lab and imaging results will be communicated to you by MyChart, letter or phone within 4 business days after the clinic has received the results. If you do not hear from us within 7 days, please contact the clinic through [employer] or phone. If you have a critical or abnormal lab result, we will notify you by phone as soon as possible.  Submit refill requests through [employer] or call your pharmacy and they will forward the refill request to us. Please allow 3 business days for your refill to be completed.          Additional Information About Your Visit        [facility] Information     CastTV lets you send messages to your doctor, view your test results, renew your prescriptions, schedule appointments and more. To sign up, go to [email or web address] . Click on \"Log in\" on the left side of the screen, which will take you to the Welcome page. Then click on \"Sign up Now\" on the right side of the page.     You will be asked to enter the access code listed below, as well as some personal information. Please follow the directions to create your username and password.     Your access code is: [email or web address]  Expires: 2018  8:26 PM     Your access code will  in 90 days. If you need help or a new code, please call your [place] clinic or [phone].        Care EveryWhere ID     This is your Care EveryWhere ID. This could be used by other organizations to access your [place] medical records  [id number]        Your Vitals Were     Pulse Temperature Respirations Pulse Oximetry BMI (Body Mass Index)       71 97.5  F (36.4  C) (Oral) 16 100% 36.79 kg/m2        Blood Pressure from Last 3 Encounters:   18 171/82   18 136/82   18 146/88    Weight from Last 3 Encounters:   18 226 lb 3.2 oz (102.6 kg)   18 229 lb (103.9 kg)   18 228 lb (103.4 kg)              Today, you had the " following     No orders found for display         Today's Medication Changes          These changes are accurate as of 5/18/18  6:21 PM.  If you have any questions, ask your nurse or doctor.               Start taking these medicines.        Dose/Directions    valACYclovir 1000 mg tablet   Commonly known as:  VALTREX   Used for:  Cold sore        Dose:  2000 mg   Take 2 tablets (2,000 mg) by mouth 2 times daily   Quantity:  4 tablet   Refills:  0            Where to get your medicines      These medications were sent to [id number] - [place], [place] - [address]  [address], [place] [address]    Hours:  Same info as [facility] [place] Phone:  [phone]     valACYclovir 1000 mg tablet                Primary Care Provider Office Phone # Fax #    Kristen M Kehr, PA-C [phone] [address] [facility] [address]        Equal Access to Services     [facility]: Hadii aad ku hadasho Soomaali, waaxda luqadaha, qaybta kaalmada adeegyada, waxay idiin hayaan lyly cunhaarayanna yeager . So [facility] [phone].    ATENCIÓN: Si habla español, tiene a cordova disposición servicios gratuitos de asistencia lingüística. [facility] [phone].    We comply with applicable federal civil rights laws and [place] laws. We do not discriminate on the basis of race, color, national origin, age, disability, sex, sexual orientation, or gender identity.            Thank you!     Thank you for choosing [facility]  for your care. Our goal is always to provide you with excellent care. Hearing back from our patients is one way we can continue to improve our services. Please take a few minutes to complete the written survey that you may receive in the mail after your visit with us. Thank you!             Your Updated Medication List - Protect others around you: Learn how to safely use, store and throw away your medicines at www.disposemymeds.org.          This list is accurate as of 5/18/18   6:21 PM.  Always use your most recent med list.                   Brand Name Dispense Instructions for use Diagnosis    albuterol 108 (90 Base) MCG/ACT Inhaler    PROAIR HFA/PROVENTIL HFA/VENTOLIN HFA    1 Inhaler    Inhale 2 puffs into the lungs every 4 hours as needed for shortness of breath / dyspnea or wheezing Use with spacer    Wheezing, SOB (shortness of breath)       aspirin 81 MG tablet      Take 1 tablet by mouth daily.        furosemide 20 MG tablet    LASIX    30 tablet    Take 1 tablet (20 mg) by mouth daily    Bilateral edema of lower extremity       levothyroxine 137 MCG tablet    SYNTHROID/LEVOTHROID    90 tablet    Take 1 tablet (137 mcg) by mouth daily    Other specified hypothyroidism       losartan-hydrochlorothiazide 100-25 MG per tablet    HYZAAR    90 tablet    Take 1 tablet by mouth daily APPT NEEDED FOR FURTHER REFILLS    Essential hypertension with goal blood pressure less than 140/90       metoprolol succinate 100 MG 24 hr tablet    TOPROL-XL    90 tablet    Take 1 tablet (100 mg) by mouth daily APPT NEEDED FOR FURTHER REFILLS    Essential hypertension with goal blood pressure less than 140/90       order for DME     1 Units    Equipment being ordered: compression stockings / mild compression    Bilateral edema of lower extremity       silver sulfADIAZINE 1 % cream    SILVADENE    400 g    Apply topically 2 times daily    Burn of finger, right, unspecified degree, initial encounter       simvastatin 40 MG tablet    ZOCOR    90 tablet    Take 1 tablet (40 mg) by mouth At Bedtime    Hyperlipidemia LDL goal <130       spacer/aero-hold chamber mask Olya     1 each    1 Adult size spacer to use with MDI inhalers.    Wheezing, SOB (shortness of breath)       valACYclovir 1000 mg tablet    VALTREX    4 tablet    Take 2 tablets (2,000 mg) by mouth 2 times daily    Cold sore

## 2018-05-18 NOTE — PROGRESS NOTES
SUBJECTIVE:   Migdalia Escalera is a 63 year old female who presents to clinic today for the following health issues:    Chief Complaint   Patient presents with     Mouth Lesions     COLD SORE ON THE LEFT HAND SIDE OF TOP LIP X 2 DAYS        Duration: 2 days    Description (location/character/radiation): left upper lip    Intensity:  moderate    Accompanying signs and symptoms: pain    History (similar episodes/previous evaluation): cold sore    Precipitating or alleviating factors: None    Therapies tried and outcome: topical cream          Problem list and histories reviewed & adjusted, as indicated.  Additional history: as documented    Patient Active Problem List   Diagnosis     Hyperlipidemia LDL goal <130     Advanced directives, counseling/discussion     H/O: hysterectomy     Vitamin D deficiency     Hypothyroidism, unspecified hypothyroidism type     History of colonic polyps     Essential hypertension with goal blood pressure less than 140/90     Breast hypertrophy     Past Surgical History:   Procedure Laterality Date     HYSTERECTOMY, PAP NO LONGER INDICATED       SURGICAL HISTORY OF -       thoracic spine       Social History   Substance Use Topics     Smoking status: Former Smoker     Types: Cigarettes     Quit date: 12/1/2005     Smokeless tobacco: Never Used      Comment: no 2nd hand smoke exposure     Alcohol use Yes      Comment: occ.     Family History   Problem Relation Age of Onset     Arthritis Mother      Prostate Cancer Father      Hypertension Father      CANCER Father      bone     DIABETES Maternal Grandfather      Thyroid Disease Sister          Current Outpatient Prescriptions   Medication Sig Dispense Refill     albuterol (PROAIR HFA, PROVENTIL HFA, VENTOLIN HFA) 108 (90 BASE) MCG/ACT inhaler Inhale 2 puffs into the lungs every 4 hours as needed for shortness of breath / dyspnea or wheezing Use with spacer 1 Inhaler 0     aspirin 81 MG tablet Take 1 tablet by mouth daily.        furosemide (LASIX) 20 MG tablet Take 1 tablet (20 mg) by mouth daily 30 tablet 1     levothyroxine (SYNTHROID/LEVOTHROID) 137 MCG tablet Take 1 tablet (137 mcg) by mouth daily 90 tablet 3     losartan-hydrochlorothiazide (HYZAAR) 100-25 MG per tablet Take 1 tablet by mouth daily APPT NEEDED FOR FURTHER REFILLS 90 tablet 1     metoprolol succinate (TOPROL-XL) 100 MG 24 hr tablet Take 1 tablet (100 mg) by mouth daily APPT NEEDED FOR FURTHER REFILLS 90 tablet 1     simvastatin (ZOCOR) 40 MG tablet Take 1 tablet (40 mg) by mouth At Bedtime 90 tablet 3     Spacer/Aero-Holding Chambers (SPACER/AERO-HOLD CHAMBER MASK) KOTA 1 Adult size spacer to use with MDI inhalers. 1 each 0     order for DME Equipment being ordered: compression stockings / mild compression (Patient not taking: Reported on 5/18/2018) 1 Units 3     silver sulfADIAZINE (SILVADENE) 1 % cream Apply topically 2 times daily (Patient not taking: Reported on 5/18/2018) 400 g 0     Allergies   Allergen Reactions     Amoxicillin      Erythromycin      Recent Labs   Lab Test  03/29/18   0830  05/24/17   0858  10/20/16   0943  07/11/16   2049   LDL  109*  137*  121*   --    HDL  75  73  75   --    TRIG  93  106  112   --    ALT  28   --   26  26   CR  0.76  0.84  0.86  1.15*   GFRESTIMATED  76  68  66  48*   GFRESTBLACK  >90  82  80  58*   POTASSIUM  4.1  4.2  4.0  3.6   TSH  2.53  2.70   --    --       BP Readings from Last 3 Encounters:   05/18/18 171/82   03/29/18 136/82   03/09/18 146/88    Wt Readings from Last 3 Encounters:   05/18/18 226 lb 3.2 oz (102.6 kg)   03/29/18 229 lb (103.9 kg)   03/09/18 228 lb (103.4 kg)                  Labs reviewed in EPIC    Reviewed and updated as needed this visit by clinical staff  Allergies  Meds       Reviewed and updated as needed this visit by Provider         ROS:  Constitutional, HEENT, cardiovascular, pulmonary, gi and gu systems are negative, except as otherwise noted.    OBJECTIVE:     /82  Pulse 71  Temp  97.5  F (36.4  C) (Oral)  Resp 16  Wt 226 lb 3.2 oz (102.6 kg)  SpO2 100%  BMI 36.79 kg/m2  Body mass index is 36.79 kg/(m^2).  GENERAL: healthy, alert and no distress  NECK: no adenopathy, no asymmetry, masses, or scars and thyroid normal to palpation  RESP: lungs clear to auscultation - no rales, rhonchi or wheezes  SKIN: cold sores involving left upper lip with some swelling       ASSESSMENT/PLAN:         ICD-10-CM    1. Cold sore B00.1 valACYclovir (VALTREX) 1000 mg tablet       Valtrex prescribed, common side effect discussed.  Home care explained and written information provided.  All questions answered.      Patient Instructions       Understanding Cold Sores  Cold sores are small blisters or sores on the lip or sometimes inside the mouth. Many people get them from time to time. Cold sores usually are not serious, and they usually heal in a week or two. They are caused by 2 related viruses, herpes simplex type 1 and 2. These viruses spread very easily. Many people have one or both of these viruses in their body. More than 4 in every 5 people are infected with herpes simplex type 1. Once you have the virus that causes cold sores, it stays in your body for the rest of your life. But it can be inactive for long periods.  What causes a cold sore?  Cold sores are usually caused by herpes simplex virus type 1. Less often, they are caused by herpes simplex virus type 2. Herpes simplex virus type 2 is the more common cause of genital sores. The herpes viruses can enter the body through a break in the skin such as a scrape. Or they may enter through mucous membranes such as the lips or mouth. Some ways to get the viruses include:    Kissing someone who has a cold sore    Sharing a drinking glass, eating utensils, or lip balm with someone who has a cold sore    Having oral sex with someone who has a cold sore  A  baby can also get the infection at birth.  If you have a herpes virus, you can to pass it along  even when you don t have a sore.  Cold sores flare up occasionally. Things that can cause an outbreak include:    Sun exposure    Fever    Stress or exhaustion    Menstruation    Skin irritation    Another unrelated Illness such as pneumonia, urinary infection, or cancer  What are the symptoms of a cold sore?  Symptoms can include:    A blister-like sore or cluster of sores. These often occur at the edge of the lips but may appear inside the mouth.    Skin redness around the sores.    Pain or itching in the area of the outbreak. Often the pain or itching develops 12 to 24 hours before the sore become visible.    Flu-like symptoms, including swollen glands, headache, body ache, or fever. These typically occur only at the time of the first infection.  Cold sores may also occur on fingers. They may rarely infect the eyes, a serious possible complication.  Some people have symptoms a day or two before an outbreak. They may feel tenderness, burning, itching, or tingling before a cold sore appears. Cold sores tend to come back in the same area that they first appeared.  How are cold sores treated?  Treatment for cold sores focuses on relieving and shortening symptoms. For people with frequent outbreaks, treatment works to decrease how often future episodes.  Treatments may include:    Prescription or over-the-counter pain medicines. These can help with discomfort, especially if sores are inside the mouth.    Antiviral medicines. These may be pills that are taken by mouth or a cream to apply to sores. They may help shorten an outbreak and reduce the severity of symptoms.  They may be used to help prevent future outbreaks if you have disabling recurrent infections.    Self-care such as extra rest and drinking more fluids. These may help relieve the flu-like symptoms of a first outbreak.  How are cold sores diagnosed?  Your healthcare provider makes the diagnosis mainly by looking at the sores and doing a clinical exam.   This may be confirmed by swab tests or blood tests.  How can I prevent cold sores?  You can help reduce the spread of the herpes viruses that cause cold sores. This can help both you and others avoid getting cold sores. Follow these tips:    Do not kiss others if you have a cold sore. Also avoid kissing someone with a cold sore.    Do not share eating utensils, lip balm, razors, or towels with someone who has a cold sore.    Wash your hands after touching the area of a cold sore. The herpes virus can be carried from your face to your hands when you touch the area of a cold sore. When this happens, wash your hands thoroughly, for at least 20 seconds. When you can t wash with soap and water, use an alcohol-based hand .    Disinfect things you touch often, such as phones and keyboards.      If you feel a cold sore coming on, do the same things you would do when a cold sore is present to avoid spreading the virus.    Use condoms to help prevent passing on the viruses through sex.  What are the possible complications of a cold sore?  Cold sores usually go away by themselves within 2 weeks. For most people cold sores are not serious. The viruses that cause cold sores can cause more serious illness, though. People who have a weak immune system may get more serious infections from herpes viruses. These include people being treated for cancer or who have HIV disease. Babies may also become very ill from a herpes infection.      When should I call my healthcare provider?  Call your healthcare provider right away if you have any of these:    Fever of 100.4 F (38 C) or higher, or as directed    Pain that gets worse    You cannot eat or drink because of painful sores    Symptoms don t get better within 5 to 7 days    Blisters spread beyond the mouth or lip to areas on the chest, arms, face, or legs   Date Last Reviewed: 3/28/2016    [id number] [employer]. [address] [facility], [place] [address]. All  rights reserved. This information is not intended as a substitute for professional medical care. Always follow your healthcare professional's instructions.            Renato Rai MD  [facility]

## 2018-05-21 NOTE — PROGRESS NOTES
SUBJECTIVE:   Migdalia Escalera is a 63 year old female who presents to clinic today for the following health issues:    ED/UC Followup:  Facility: [facility]   Date of visit: 05/18/2018  Reason for visit: Cold Sore  Current Status: No change      Patient is a 63-year-old female was in the urgent care on the 18th of this month for a cold sore on the lip.  She was started on antiviral 2 days after the onset of symptoms but still the sore is still painful and raw.  She has had these recurrent like every year or every other year on the same spot.    Problem list and histories reviewed & adjusted, as indicated.  Additional history: as documented    Patient Active Problem List   Diagnosis     Hyperlipidemia LDL goal <130     Advanced directives, counseling/discussion     H/O: hysterectomy     Vitamin D deficiency     Hypothyroidism, unspecified hypothyroidism type     History of colonic polyps     Essential hypertension with goal blood pressure less than 140/90     Breast hypertrophy     Past Surgical History:   Procedure Laterality Date     HYSTERECTOMY, PAP NO LONGER INDICATED       SURGICAL HISTORY OF -       thoracic spine       Social History   Substance Use Topics     Smoking status: Former Smoker     Types: Cigarettes     Quit date: 12/1/2005     Smokeless tobacco: Never Used      Comment: no 2nd hand smoke exposure     Alcohol use Yes      Comment: occ.     Family History   Problem Relation Age of Onset     Arthritis Mother      Prostate Cancer Father      Hypertension Father      CANCER Father      bone     DIABETES Maternal Grandfather      Thyroid Disease Sister          Reviewed and updated as needed this visit by clinical staff    ROS:  Constitutional, HEENT, cardiovascular, pulmonary, gi and gu systems are negative, except as otherwise noted.    OBJECTIVE:     /78 (BP Location: Left arm, Patient Position: Chair, Cuff Size: Adult Regular)  Pulse 70  Temp 97  F (36.1  C) (Oral)  Resp 13  Ht 5'  "5.75\" (1.67 m)  SpO2 97%  Breastfeeding? No  There is no height or weight on file to calculate BMI.  GENERAL: healthy, alert and no distress  MOUTH: Grouped confluent cold sores on the left upper lip with crusting. The lip also shows some swelling  NECK: no adenopathy and thyroid normal to palpation  RESP: lungs clear to auscultation - no rales, rhonchi or wheezes  CV: regular rate and rhythm, normal S1 S2, no S3 or S4, no murmur, click or rub, no peripheral edema       ASSESSMENT/PLAN:     (B00.1) Recurrent cold sores  (primary encounter diagnosis)  Comment: Discussed the pathophysiology of cold sores and treatment options. Given she has recurrent episodes discussed having medication at hand to start at the earliest symptoms.  Plan: valACYclovir (VALTREX) 1000 mg tablet,         mupirocin (BACTROBAN) 2 % ointment    (Z78.0) Asymptomatic postmenopausal status  (Z23) Need for prophylactic vaccination against Streptococcus pneumoniae (pneumococcus)  Comment: Follow up with PCP    Call or return to clinic prn if these symptoms worsen or fail to improve as anticipated in 1 week.    Tapan Chavira MD  [facility]  "

## 2018-05-22 ENCOUNTER — OFFICE VISIT (OUTPATIENT)
Dept: FAMILY MEDICINE | Facility: CLINIC | Age: 63
End: 2018-05-22
Payer: COMMERCIAL

## 2018-05-22 VITALS
HEART RATE: 70 BPM | RESPIRATION RATE: 13 BRPM | SYSTOLIC BLOOD PRESSURE: 136 MMHG | HEIGHT: 66 IN | OXYGEN SATURATION: 97 % | TEMPERATURE: 97 F | DIASTOLIC BLOOD PRESSURE: 78 MMHG

## 2018-05-22 DIAGNOSIS — B00.1 RECURRENT COLD SORES: Primary | ICD-10-CM

## 2018-05-22 DIAGNOSIS — Z23 NEED FOR PROPHYLACTIC VACCINATION AGAINST STREPTOCOCCUS PNEUMONIAE (PNEUMOCOCCUS): ICD-10-CM

## 2018-05-22 DIAGNOSIS — Z78.0 ASYMPTOMATIC POSTMENOPAUSAL STATUS: ICD-10-CM

## 2018-05-22 PROCEDURE — 99213 OFFICE O/P EST LOW 20 MIN: CPT | Performed by: FAMILY MEDICINE

## 2018-05-22 RX ORDER — VALACYCLOVIR HYDROCHLORIDE 1 G/1
2000 TABLET, FILM COATED ORAL 2 TIMES DAILY
Qty: 4 TABLET | Refills: 2 | Status: SHIPPED | OUTPATIENT
Start: 2018-05-22 | End: 2018-11-17

## 2018-05-22 RX ORDER — MUPIROCIN 20 MG/G
OINTMENT TOPICAL 2 TIMES DAILY
Qty: 15 G | Refills: 0 | Status: SHIPPED | OUTPATIENT
Start: 2018-05-22 | End: 2019-02-12

## 2018-05-22 NOTE — NURSING NOTE
"Chief Complaint   Patient presents with     Hospital F/U     Cold Sore     Health Maintenance     Fall Risk, Pneumococcal, Dexa      Initial /78 (BP Location: Left arm, Patient Position: Chair, Cuff Size: Adult Regular)  Pulse 70  Temp 97  F (36.1  C) (Oral)  Resp 13  Ht 5' 5.75\" (1.67 m)  SpO2 97%  Breastfeeding? No Estimated body mass index is 36.79 kg/(m^2) as calculated from the following:    Height as of 3/29/18: 5' 5.75\" (1.67 m).    Weight as of 5/18/18: 226 lb 3.2 oz (102.6 kg).  BP completed using cuff size: regular    Jenaro Jain  "

## 2018-05-22 NOTE — MR AVS SNAPSHOT
"              After Visit Summary   2018    Migdalia Escalera    MRN: [id number]           Patient Information     Date Of Birth          1955        Visit Information        Provider Department      2018 9:20 AM Tapan Chavira MD [facility]        Today's Diagnoses     Recurrent cold sores    -  1    Asymptomatic postmenopausal status        Need for prophylactic vaccination against Streptococcus pneumoniae (pneumococcus)           Follow-ups after your visit        Who to contact     If you have questions or need follow up information about today's clinic visit or your schedule please contact [facility] directly at [phone].  Normal or non-critical lab and imaging results will be communicated to you by MyChart, letter or phone within 4 business days after the clinic has received the results. If you do not hear from us within 7 days, please contact the clinic through [employer] [facility] or phone. If you have a critical or abnormal lab result, we will notify you by phone as soon as possible.  Submit refill requests through #waywire or call your pharmacy and they will forward the refill request to us. Please allow 3 business days for your refill to be completed.          Additional Information About Your Visit        MyChart Information     #waywire lets you send messages to your doctor, view your test results, renew your prescriptions, schedule appointments and more. To sign up, go to [email or web address] . Click on \"Log in\" on the left side of the screen, which will take you to the Welcome page. Then click on \"Sign up Now\" on the right side of the page.     You will be asked to enter the access code listed below, as well as some personal information. Please follow the directions to create your username and password.     Your access code is: [email or web address]  Expires: 2018  8:26 PM     Your access code will  in 90 days. If you need help or a new code, please call your " "[facility] or [phone].        Care EveryWhere ID     This is your Care EveryWhere ID. This could be used by other organizations to access your [facility] medical records  [id number]        Your Vitals Were     Pulse Temperature Respirations Height Pulse Oximetry Breastfeeding?    70 97  F (36.1  C) (Oral) 13 5' 5.75\" (1.67 m) 97% No       Blood Pressure from Last 3 Encounters:   05/22/18 136/78   05/18/18 171/82   03/29/18 136/82    Weight from Last 3 Encounters:   05/18/18 226 lb 3.2 oz (102.6 kg)   03/29/18 229 lb (103.9 kg)   03/09/18 228 lb (103.4 kg)              Today, you had the following     No orders found for display         Today's Medication Changes          These changes are accurate as of 5/22/18  9:44 AM.  If you have any questions, ask your nurse or doctor.               Start taking these medicines.        Dose/Directions    mupirocin 2 % ointment   Commonly known as:  BACTROBAN   Used for:  Recurrent cold sores   Started by:  Tapan Chavira MD        Apply topically 2 times daily for 5 days   Quantity:  15 g   Refills:  0         These medicines have changed or have updated prescriptions.        Dose/Directions    * valACYclovir 1000 mg tablet   Commonly known as:  VALTREX   This may have changed:  Another medication with the same name was added. Make sure you understand how and when to take each.   Used for:  Cold sore   Changed by:  Tapan Chavira MD        Dose:  2000 mg   Take 2 tablets (2,000 mg) by mouth 2 times daily   Quantity:  4 tablet   Refills:  0       * valACYclovir 1000 mg tablet   Commonly known as:  VALTREX   This may have changed:  You were already taking a medication with the same name, and this prescription was added. Make sure you understand how and when to take each.   Used for:  Recurrent cold sores   Changed by:  Tapan Chavira MD        Dose:  2000 mg   Take 2 tablets (2,000 mg) by mouth 2 times daily   Quantity:  4 tablet   Refills:  2    "    * Notice:  This list has 2 medication(s) that are the same as other medications prescribed for you. Read the directions carefully, and ask your doctor or other care provider to review them with you.         Where to get your medicines      These medications were sent to [id number] - [place], [place] - [address]  [address], [place] [address]    Hours:  Same info as Mar[facility] - [place] Phone:  [phone]     mupirocin 2 % ointment    valACYclovir 1000 mg tablet                Primary Care Provider Office Phone # Fax #    Kristen M Kehr, PA-C [phone] [address] [facility] [address]        Equal Access to Services     CARTER STERLING : Hadii aad ku hadasho Soomaali, waaxda luqadaha, qaybta kaalmada adeegyada, waxay noéin haysara yeager . So [facility] [phone].    ATENCIÓN: Si habla español, tiene a cordova disposición servicios gratuitos de asistencia lingüística. [facility] [phone].    We comply with applicable federal civil rights laws and [place] laws. We do not discriminate on the basis of race, color, national origin, age, disability, sex, sexual orientation, or gender identity.            Thank you!     Thank you for choosing [facility]  for your care. Our goal is always to provide you with excellent care. Hearing back from our patients is one way we can continue to improve our services. Please take a few minutes to complete the written survey that you may receive in the mail after your visit with us. Thank you!             Your Updated Medication List - Protect others around you: Learn how to safely use, store and throw away your medicines at www.disposemymeds.org.          This list is accurate as of 5/22/18  9:44 AM.  Always use your most recent med list.                   Brand Name Dispense Instructions for use Diagnosis    albuterol 108 (90 Base) MCG/ACT Inhaler    PROAIR HFA/PROVENTIL HFA/VENTOLIN HFA    1 Inhaler    Inhale 2  puffs into the lungs every 4 hours as needed for shortness of breath / dyspnea or wheezing Use with spacer    Wheezing, SOB (shortness of breath)       aspirin 81 MG tablet      Take 1 tablet by mouth daily.        furosemide 20 MG tablet    LASIX    30 tablet    Take 1 tablet (20 mg) by mouth daily    Bilateral edema of lower extremity       levothyroxine 137 MCG tablet    SYNTHROID/LEVOTHROID    90 tablet    Take 1 tablet (137 mcg) by mouth daily    Other specified hypothyroidism       losartan-hydrochlorothiazide 100-25 MG per tablet    HYZAAR    90 tablet    Take 1 tablet by mouth daily APPT NEEDED FOR FURTHER REFILLS    Essential hypertension with goal blood pressure less than 140/90       metoprolol succinate 100 MG 24 hr tablet    TOPROL-XL    90 tablet    Take 1 tablet (100 mg) by mouth daily APPT NEEDED FOR FURTHER REFILLS    Essential hypertension with goal blood pressure less than 140/90       mupirocin 2 % ointment    BACTROBAN    15 g    Apply topically 2 times daily for 5 days    Recurrent cold sores       order for DME     1 Units    Equipment being ordered: compression stockings / mild compression    Bilateral edema of lower extremity       silver sulfADIAZINE 1 % cream    SILVADENE    400 g    Apply topically 2 times daily    Burn of finger, right, unspecified degree, initial encounter       simvastatin 40 MG tablet    ZOCOR    90 tablet    Take 1 tablet (40 mg) by mouth At Bedtime    Hyperlipidemia LDL goal <130       spacer/aero-hold chamber mask Olya     1 each    1 Adult size spacer to use with MDI inhalers.    Wheezing, SOB (shortness of breath)       * valACYclovir 1000 mg tablet    VALTREX    4 tablet    Take 2 tablets (2,000 mg) by mouth 2 times daily    Cold sore       * valACYclovir 1000 mg tablet    VALTREX    4 tablet    Take 2 tablets (2,000 mg) by mouth 2 times daily    Recurrent cold sores       * Notice:  This list has 2 medication(s) that are the same as other medications prescribed  for you. Read the directions carefully, and ask your doctor or other care provider to review them with you.

## 2018-05-22 NOTE — PATIENT INSTRUCTIONS
Herpes: Caring for Sores  Good hygiene matters when you have herpes. Take care of your sores to speed healing. Neglected sores can lead to other infections.     Warm baths can ease itching and burning caused by sores.   To ease your symptoms    Take any medicines as directed.    Take acetaminophen or ibuprofen for pain.    Take warm or cool baths to relieve itching of sores. And don t share towels when you have a sore.    Urinate in a tub of warm water to prevent burning. This works for women with genital herpes.    Don t wear tight clothes or nylon underwear. They can trap moisture, cause chafing, and prevent sores from healing.  To speed healing    Wash the sores with mild soap and water. And wash your hands after you touch a sore.    Dry the affected area completely by patting a towel over it. Don't rub. Don't share towels.    Don t bandage sores. The dry air helps them heal.    Avoid ointments unless they are prescribed. They retain moisture and may cause other infections.    Don t pick at the sores. This can slow healing.    Don t touch your eyes when you have a sore. The virus may travel from your fingertips to your eyes.  Resources  [employer] STI Hotline  465.516.3251  www.ashastd.org  Centers for Disease Control and Prevention  296.679.6615  www.cdc.gov/std   Date Last Reviewed: 1/1/2017 2000-2017 [employer]. [address] [place] [address], [place] [address]. All rights reserved. This information is not intended as a substitute for professional medical care. Always follow your healthcare professional's instructions.

## 2018-10-14 ENCOUNTER — OFFICE VISIT (OUTPATIENT)
Dept: URGENT CARE | Facility: URGENT CARE | Age: 63
End: 2018-10-14
Payer: COMMERCIAL

## 2018-10-14 VITALS
OXYGEN SATURATION: 96 % | HEART RATE: 70 BPM | SYSTOLIC BLOOD PRESSURE: 142 MMHG | TEMPERATURE: 98 F | BODY MASS INDEX: 36.76 KG/M2 | DIASTOLIC BLOOD PRESSURE: 88 MMHG | WEIGHT: 226 LBS

## 2018-10-14 DIAGNOSIS — H57.8A9 SENSATION OF FOREIGN BODY IN EYE: Primary | ICD-10-CM

## 2018-10-14 DIAGNOSIS — H57.12 LEFT EYE PAIN: ICD-10-CM

## 2018-10-14 PROCEDURE — 99214 OFFICE O/P EST MOD 30 MIN: CPT | Performed by: PHYSICIAN ASSISTANT

## 2018-10-14 ASSESSMENT — ENCOUNTER SYMPTOMS
CONSTITUTIONAL NEGATIVE: 1
EYE PAIN: 1
FEVER: 0
RESPIRATORY NEGATIVE: 1
PHOTOPHOBIA: 0
COUGH: 0
PALPITATIONS: 0
WEIGHT LOSS: 0
CARDIOVASCULAR NEGATIVE: 1
DOUBLE VISION: 0
GASTROINTESTINAL NEGATIVE: 1
EYE DISCHARGE: 1
HEMOPTYSIS: 0
BLURRED VISION: 1
EYE REDNESS: 1
DIAPHORESIS: 0

## 2018-10-14 NOTE — MR AVS SNAPSHOT
"              After Visit Summary   10/14/2018    Migdalia Escalera    MRN: [id number]           Patient Information     Date Of Birth          1955        Visit Information        Provider Department      10/14/2018 9:15 AM Mildred Mcmillan PA-C [facility]        Today's Diagnoses     Foreign body, eye, left, initial encounter    -  1    Left eye pain           Follow-ups after your visit        Who to contact     If you have questions or need follow up information about today's clinic visit or your schedule please contact [facility] directly at [phone].  Normal or non-critical lab and imaging results will be communicated to you by Nanohart, letter or phone within 4 business days after the clinic has received the results. If you do not hear from us within 7 days, please contact the clinic through Nanohart or phone. If you have a critical or abnormal lab result, we will notify you by phone as soon as possible.  Submit refill requests through [employer] or call your pharmacy and they will forward the refill request to us. Please allow 3 business days for your refill to be completed.          Additional Information About Your Visit        MyChart Information     [employer] lets you send messages to your doctor, view your test results, renew your prescriptions, schedule appointments and more. To sign up, go to [email or web address] . Click on \"Log in\" on the left side of the screen, which will take you to the Welcome page. Then click on \"Sign up Now\" on the right side of the page.     You will be asked to enter the access code listed below, as well as some personal information. Please follow the directions to create your username and password.     Your access code is: [email or web address]  Expires: 2019  9:41 AM     Your access code will  in 90 days. If you need help or a new code, please call your [facility] or [phone].        Care EveryWhere ID     This is your Care EveryWhere " ID. This could be used by other organizations to access your [facility] medical records  [id number]        Your Vitals Were     Pulse Temperature Pulse Oximetry BMI (Body Mass Index)          70 98  F (36.7  C) (Tympanic) 96% 36.76 kg/m2         Blood Pressure from Last 3 Encounters:   10/14/18 142/88   05/22/18 136/78   05/18/18 171/82    Weight from Last 3 Encounters:   10/14/18 226 lb (102.5 kg)   05/18/18 226 lb 3.2 oz (102.6 kg)   03/29/18 229 lb (103.9 kg)              Today, you had the following     No orders found for display       Primary Care Provider Office Phone # Fax #    Kristen M Kehr, PA-C [phone] [id number] [facility] [address]        Equal Access to Services     [facility]: Hadii vida loya hadasho Soomaali, waaxda luqadaha, qaybta kaalmada adeegyada, fanta bess haysara yeager . So [facility] [phone].    ATENCIÓN: Si habla español, tiene a cordova disposición servicios gratuitos de asistencia lingüística. Magalysame al [phone].    We comply with applicable federal civil rights laws and [place] laws. We do not discriminate on the basis of race, color, national origin, age, disability, sex, sexual orientation, or gender identity.            Thank you!     Thank you for choosing [facility]  for your care. Our goal is always to provide you with excellent care. Hearing back from our patients is one way we can continue to improve our services. Please take a few minutes to complete the written survey that you may receive in the mail after your visit with us. Thank you!             Your Updated Medication List - Protect others around you: Learn how to safely use, store and throw away your medicines at www.disposemymeds.org.          This list is accurate as of 10/14/18  9:41 AM.  Always use your most recent med list.                   Brand Name Dispense Instructions for use Diagnosis    albuterol 108 (90 Base) MCG/ACT inhaler    PROAIR HFA/PROVENTIL  HFA/VENTOLIN HFA    1 Inhaler    Inhale 2 puffs into the lungs every 4 hours as needed for shortness of breath / dyspnea or wheezing Use with spacer    Wheezing, SOB (shortness of breath)       aspirin 81 MG tablet      Take 1 tablet by mouth daily.        furosemide 20 MG tablet    LASIX    30 tablet    Take 1 tablet (20 mg) by mouth daily    Bilateral edema of lower extremity       levothyroxine 137 MCG tablet    SYNTHROID/LEVOTHROID    90 tablet    Take 1 tablet (137 mcg) by mouth daily    Other specified hypothyroidism       losartan-hydrochlorothiazide 100-25 MG per tablet    HYZAAR    90 tablet    Take 1 tablet by mouth daily APPT NEEDED FOR FURTHER REFILLS    Essential hypertension with goal blood pressure less than 140/90       metoprolol succinate 100 MG 24 hr tablet    TOPROL-XL    90 tablet    Take 1 tablet (100 mg) by mouth daily APPT NEEDED FOR FURTHER REFILLS    Essential hypertension with goal blood pressure less than 140/90       order for DME     1 Units    Equipment being ordered: compression stockings / mild compression    Bilateral edema of lower extremity       silver sulfADIAZINE 1 % cream    SILVADENE    400 g    Apply topically 2 times daily    Burn of finger, right, unspecified degree, initial encounter       simvastatin 40 MG tablet    ZOCOR    90 tablet    Take 1 tablet (40 mg) by mouth At Bedtime    Hyperlipidemia LDL goal <130       spacer/aero-hold chamber mask Olya     1 each    1 Adult size spacer to use with MDI inhalers.    Wheezing, SOB (shortness of breath)       * valACYclovir 1000 mg tablet    VALTREX    4 tablet    Take 2 tablets (2,000 mg) by mouth 2 times daily    Cold sore       * valACYclovir 1000 mg tablet    VALTREX    4 tablet    Take 2 tablets (2,000 mg) by mouth 2 times daily    Recurrent cold sores       * Notice:  This list has 2 medication(s) that are the same as other medications prescribed for you. Read the directions carefully, and ask your doctor or other care  provider to review them with you.

## 2018-10-14 NOTE — PROGRESS NOTES
SUBJECTIVE:     HPI  Migdalia Escalera is a 63 year old female who presents to clinic today for the following health issues:  Eye(s) Problem    Duration: 1day    Description:  Location: left  Pain: YES with blurry vision  Redness: YES  Discharge: YES, watery    Accompanying signs and symptoms: No cough, shortness of breath or wheezing.  No sore throat or sinus congestion/pain/pressure. No HA, n/v, or dizziness.    History (Trauma, foreign body exposure,): was weeding at her home yesterday when the weeds got into her eye    Precipitating or alleviating factors (contact use): worse with blinking and relieved when not    Therapies tried and outcome: flushing her eye with minimal relief      Reviewed PMH, FMH and SOH.  Patient Active Problem List   Diagnosis     Hyperlipidemia LDL goal <130     Advanced directives, counseling/discussion     H/O: hysterectomy     Vitamin D deficiency     Hypothyroidism, unspecified hypothyroidism type     History of colonic polyps     Essential hypertension with goal blood pressure less than 140/90     Breast hypertrophy     Current Outpatient Prescriptions   Medication Sig Dispense Refill     albuterol (PROAIR HFA, PROVENTIL HFA, VENTOLIN HFA) 108 (90 BASE) MCG/ACT inhaler Inhale 2 puffs into the lungs every 4 hours as needed for shortness of breath / dyspnea or wheezing Use with spacer 1 Inhaler 0     aspirin 81 MG tablet Take 1 tablet by mouth daily.       furosemide (LASIX) 20 MG tablet Take 1 tablet (20 mg) by mouth daily 30 tablet 1     levothyroxine (SYNTHROID/LEVOTHROID) 137 MCG tablet Take 1 tablet (137 mcg) by mouth daily 90 tablet 3     losartan-hydrochlorothiazide (HYZAAR) 100-25 MG per tablet Take 1 tablet by mouth daily APPT NEEDED FOR FURTHER REFILLS 90 tablet 1     metoprolol succinate (TOPROL-XL) 100 MG 24 hr tablet Take 1 tablet (100 mg) by mouth daily APPT NEEDED FOR FURTHER REFILLS 90 tablet 1     order for DME Equipment being ordered: compression stockings / mild  compression 1 Units 3     silver sulfADIAZINE (SILVADENE) 1 % cream Apply topically 2 times daily 400 g 0     simvastatin (ZOCOR) 40 MG tablet Take 1 tablet (40 mg) by mouth At Bedtime 90 tablet 3     Spacer/Aero-Holding Chambers (SPACER/AERO-HOLD CHAMBER MASK) KOTA 1 Adult size spacer to use with MDI inhalers. 1 each 0     valACYclovir (VALTREX) 1000 mg tablet Take 2 tablets (2,000 mg) by mouth 2 times daily 4 tablet 2     valACYclovir (VALTREX) 1000 mg tablet Take 2 tablets (2,000 mg) by mouth 2 times daily 4 tablet 0     Allergies   Allergen Reactions     Amoxicillin      Erythromycin        Review of Systems   Constitutional: Negative.  Negative for diaphoresis, fever and weight loss.   HENT: Negative.    Eyes: Positive for blurred vision, pain, discharge and redness. Negative for double vision and photophobia.   Respiratory: Negative.  Negative for cough and hemoptysis.    Cardiovascular: Negative.  Negative for chest pain and palpitations.   Gastrointestinal: Negative.    Skin: Negative.    All other systems reviewed and are negative.      /88  Pulse 70  Temp 98  F (36.7  C) (Tympanic)  Wt 226 lb (102.5 kg)  SpO2 96%  BMI 36.76 kg/m2  Physical Exam   Constitutional: She is oriented to person, place, and time and well-developed, well-nourished, and in no distress. No distress.   HENT:   Head: Normocephalic and atraumatic.   Nose: Nose normal.   Mouth/Throat: Uvula is midline, oropharynx is clear and moist and mucous membranes are normal. No oropharyngeal exudate or posterior oropharyngeal erythema.   TMs are intact without any erythema or bulging bilaterally.  Airway is patent.   Eyes: EOM are normal. Pupils are equal, round, and reactive to light. Lids are everted and swept, no foreign bodies found. Right eye exhibits no discharge. No foreign body present in the right eye. Left eye exhibits discharge. No foreign body present in the left eye. Right conjunctiva is not injected. Left conjunctiva is  injected. No scleral icterus.   Fundoscopic exam:       The right eye shows no hemorrhage and no papilledema. The right eye shows red reflex.        The left eye shows no hemorrhage and no papilledema. The left eye shows red reflex.   Slit lamp exam:       The right eye shows no corneal abrasion, no corneal flare, no corneal ulcer, no foreign body and no fluorescein uptake.        The left eye shows fluorescein uptake. The left eye shows no corneal abrasion, no corneal flare, no corneal ulcer and no foreign body.   Visual acuity L eye 20/50.  Visual acuity R eye 20/30.   Neck: Normal range of motion. Neck supple. No thyromegaly present.   Cardiovascular: Normal rate, regular rhythm, normal heart sounds and intact distal pulses.  Exam reveals no gallop and no friction rub.    No murmur heard.  Pulmonary/Chest: Effort normal and breath sounds normal. No respiratory distress. She has no wheezes. She has no rales.   Lymphadenopathy:     She has no cervical adenopathy.   Neurological: She is alert and oriented to person, place, and time.   Skin: Skin is warm and dry. No rash noted.   Psychiatric: Mood, memory, affect and judgment normal.   Nursing note and vitals reviewed.        Assessment/Plan:  Sensation of foreign body in eye:  I am unable to locate a foreign body in her eye.  ?corneal abrasion vs ulcer vs foreign body vs conjunctivitis.  Due to the severity of her discomfort, recommend further evaluation and management in the ER.  Will most likely need a much more thorough eye exam with slit lamp evaluation.  Patient has declined transportation via ambulance and will have family drive her.  Understands risks and benefits of ambulance transfer and she has declined.  Call 911 if worsening symptoms.  She is not sure which ER she will be going to.  F/u with PCP after ER visit.     Left eye pain          Mildred See ALICE Mcmillan

## 2018-11-17 DIAGNOSIS — B00.1 RECURRENT COLD SORES: ICD-10-CM

## 2018-11-19 RX ORDER — VALACYCLOVIR HYDROCHLORIDE 1 G/1
TABLET, FILM COATED ORAL
Qty: 4 TABLET | Refills: 1 | Status: SHIPPED | OUTPATIENT
Start: 2018-11-19 | End: 2019-08-15

## 2018-11-29 ENCOUNTER — DOCUMENTATION ONLY (OUTPATIENT)
Dept: LAB | Facility: CLINIC | Age: 63
End: 2018-11-29

## 2018-11-29 DIAGNOSIS — E78.5 HYPERLIPIDEMIA LDL GOAL <130: ICD-10-CM

## 2018-11-29 DIAGNOSIS — I10 ESSENTIAL HYPERTENSION WITH GOAL BLOOD PRESSURE LESS THAN 140/90: Primary | ICD-10-CM

## 2018-11-29 NOTE — PROGRESS NOTES
She had tests done this year already and does not need further testing until 2019 again. Kristen Kehr PA-C

## 2018-11-29 NOTE — PROGRESS NOTES
Patient has a lab appointment on 12/06/2018. Per the lab schedule scrubbing protocol they are not due for anything. Please review chart and send orders or let patient know appointment is not needed.    Thank you,  Gabby Bailey

## 2018-11-29 NOTE — PROGRESS NOTES
Please review ADD and sign Pending Pre-visit Labs in Epic.Labs 12/06/18 and BP check 12/13/18.  Karuna HILL

## 2018-12-03 DIAGNOSIS — I10 ESSENTIAL HYPERTENSION WITH GOAL BLOOD PRESSURE LESS THAN 140/90: ICD-10-CM

## 2018-12-03 RX ORDER — LOSARTAN POTASSIUM AND HYDROCHLOROTHIAZIDE 25; 100 MG/1; MG/1
TABLET ORAL
Qty: 30 TABLET | Refills: 0 | Status: SHIPPED | OUTPATIENT
Start: 2018-12-03 | End: 2018-12-13

## 2018-12-03 RX ORDER — METOPROLOL SUCCINATE 100 MG/1
TABLET, EXTENDED RELEASE ORAL
Qty: 30 TABLET | Refills: 0 | Status: SHIPPED | OUTPATIENT
Start: 2018-12-03 | End: 2018-12-13

## 2018-12-03 NOTE — TELEPHONE ENCOUNTER
Patient called back LM on voicemail to call her back at work [phone] after 11:00am.  SERGIO Bledsoe

## 2018-12-13 ENCOUNTER — OFFICE VISIT (OUTPATIENT)
Dept: FAMILY MEDICINE | Facility: CLINIC | Age: 63
End: 2018-12-13
Payer: COMMERCIAL

## 2018-12-13 VITALS
SYSTOLIC BLOOD PRESSURE: 146 MMHG | BODY MASS INDEX: 38.22 KG/M2 | TEMPERATURE: 97.9 F | HEART RATE: 64 BPM | DIASTOLIC BLOOD PRESSURE: 86 MMHG | WEIGHT: 235 LBS | OXYGEN SATURATION: 96 %

## 2018-12-13 DIAGNOSIS — Z12.31 ENCOUNTER FOR SCREENING MAMMOGRAM FOR BREAST CANCER: ICD-10-CM

## 2018-12-13 DIAGNOSIS — Z78.0 ASYMPTOMATIC POSTMENOPAUSAL STATUS: ICD-10-CM

## 2018-12-13 DIAGNOSIS — I10 ESSENTIAL HYPERTENSION WITH GOAL BLOOD PRESSURE LESS THAN 140/90: Primary | ICD-10-CM

## 2018-12-13 DIAGNOSIS — E66.01 MORBID OBESITY (H): ICD-10-CM

## 2018-12-13 PROCEDURE — 99214 OFFICE O/P EST MOD 30 MIN: CPT | Performed by: PHYSICIAN ASSISTANT

## 2018-12-13 RX ORDER — METOPROLOL SUCCINATE 100 MG/1
TABLET, EXTENDED RELEASE ORAL
Qty: 90 TABLET | Refills: 1 | Status: SHIPPED | OUTPATIENT
Start: 2018-12-13 | End: 2019-07-19

## 2018-12-13 RX ORDER — LOSARTAN POTASSIUM AND HYDROCHLOROTHIAZIDE 25; 100 MG/1; MG/1
1 TABLET ORAL DAILY
Qty: 90 TABLET | Refills: 1 | Status: SHIPPED | OUTPATIENT
Start: 2018-12-13 | End: 2019-07-26

## 2018-12-13 RX ORDER — AMLODIPINE BESYLATE 2.5 MG/1
2.5 TABLET ORAL DAILY
Qty: 30 TABLET | Refills: 1 | Status: SHIPPED | OUTPATIENT
Start: 2018-12-13 | End: 2019-01-16

## 2018-12-13 ASSESSMENT — PAIN SCALES - GENERAL: PAINLEVEL: NO PAIN (0)

## 2018-12-13 NOTE — PROGRESS NOTES
SUBJECTIVE:   Migdalia Escalera is a 63 year old female who presents to clinic today for the following health issues:    Migdalia is here today for hypertension. She is taking the losartan / hydrochlorothiazide and the toprol and still needing better control. She feels well and has no side effects from the medications.     Hypertension     Hypertension Follow-up      Outpatient blood pressures are not being checked.    Low Salt Diet: not monitoring salt    Problem list and histories reviewed & adjusted, as indicated.  Additional history: as documented    Check lumps on abdominal area.     Patient Active Problem List   Diagnosis     Hyperlipidemia LDL goal <130     Advanced directives, counseling/discussion     H/O: hysterectomy     Vitamin D deficiency     Hypothyroidism, unspecified hypothyroidism type     History of colonic polyps     Essential hypertension with goal blood pressure less than 140/90     Breast hypertrophy     Obesity (BMI 35.0-39.9) with comorbidity (H)     Past Surgical History:   Procedure Laterality Date     HYSTERECTOMY, PAP NO LONGER INDICATED       SURGICAL HISTORY OF -       thoracic spine       Social History     Tobacco Use     Smoking status: Former Smoker     Types: Cigarettes     Last attempt to quit: 2005     Years since quittin.0     Smokeless tobacco: Never Used     Tobacco comment: no 2nd hand smoke exposure   Substance Use Topics     Alcohol use: Yes     Comment: occ.     Family History   Problem Relation Age of Onset     Arthritis Mother      Prostate Cancer Father      Hypertension Father      Cancer Father         bone     Diabetes Maternal Grandfather      Thyroid Disease Sister          Allergies   Allergen Reactions     Amoxicillin      Erythromycin      BP Readings from Last 3 Encounters:   18 146/86   10/14/18 142/88   18 136/78    Wt Readings from Last 3 Encounters:   18 106.6 kg (235 lb)   10/14/18 102.5 kg (226 lb)   18 102.6 kg (226 lb  3.2 oz)                    ROS:  Constitutional, HEENT, cardiovascular, pulmonary, GI, , musculoskeletal, neuro, skin, endocrine and psych systems are negative, except as otherwise noted.    OBJECTIVE:     /86   Pulse 64   Temp 97.9  F (36.6  C) (Oral)   Wt 106.6 kg (235 lb)   SpO2 96%   BMI 38.22 kg/m    Body mass index is 38.22 kg/m .  GENERAL: healthy, alert and no distress  RESP: lungs clear to auscultation - no rales, rhonchi or wheezes  CV: regular rate and rhythm, normal S1 S2, no S3 or S4, no murmur, click or rub, no peripheral edema and peripheral pulses strong  MS: no gross musculoskeletal defects noted, no edema  SKIN: no suspicious lesions or rashes  PSYCH: mentation appears normal, affect normal/bright    Diagnostic Test Results:  none     ASSESSMENT/PLAN:       1. Essential hypertension with goal blood pressure less than 140/90  Add amlodpine 2.5 mg  Discussed lifestyle modifications with healthy diet / exercise.   She declines meeting with a nutrition specialist to help.   Side effects and how to take the medication discussed.  Return in 1 month for recheck.   - amLODIPine (NORVASC) 2.5 MG tablet; Take 1 tablet (2.5 mg) by mouth daily  Dispense: 30 tablet; Refill: 1  - losartan-hydrochlorothiazide (HYZAAR) 100-25 MG tablet; Take 1 tablet by mouth daily  Dispense: 90 tablet; Refill: 1  - metoprolol succinate ER (TOPROL-XL) 100 MG 24 hr tablet; Take 1 tablet (100 mg) by mouth daily  Dispense: 90 tablet; Refill: 1    2. Morbid obesity (H)  See above encouraged to make healthy changes. I am hoping that if she is able to work on weight reduction and healthy choices, she will be able to discontinue some blood pressure medication.     3. Asymptomatic postmenopausal status  Scheduled.   - DEXA HIP/PELVIS/SPINE - Future; Future    4. Encounter for screening mammogram for breast cancer  Scheduled.   - *MA Screening Digital Bilateral; Future      Kristen M. Kehr, PA-C  [facility]

## 2018-12-13 NOTE — NURSING NOTE
"Chief Complaint   Patient presents with     Hypertension       Initial /86   Pulse 64   Temp 97.9  F (36.6  C) (Oral)   Wt 106.6 kg (235 lb)   SpO2 96%   BMI 38.22 kg/m   Estimated body mass index is 38.22 kg/m  as calculated from the following:    Height as of 5/22/18: 1.67 m (5' 5.75\").    Weight as of this encounter: 106.6 kg (235 lb).  Medication Reconciliation: complete    HORTENCIA Garcia MA    "

## 2019-01-04 ENCOUNTER — ANCILLARY PROCEDURE (OUTPATIENT)
Dept: BONE DENSITY | Facility: CLINIC | Age: 64
End: 2019-01-04
Attending: PHYSICIAN ASSISTANT
Payer: COMMERCIAL

## 2019-01-04 ENCOUNTER — ANCILLARY PROCEDURE (OUTPATIENT)
Dept: MAMMOGRAPHY | Facility: CLINIC | Age: 64
End: 2019-01-04
Attending: PHYSICIAN ASSISTANT
Payer: COMMERCIAL

## 2019-01-04 DIAGNOSIS — Z12.31 VISIT FOR SCREENING MAMMOGRAM: ICD-10-CM

## 2019-01-04 DIAGNOSIS — Z78.0 ASYMPTOMATIC POSTMENOPAUSAL STATUS: ICD-10-CM

## 2019-01-04 PROCEDURE — 77080 DXA BONE DENSITY AXIAL: CPT | Performed by: INTERNAL MEDICINE

## 2019-01-04 PROCEDURE — 77067 SCR MAMMO BI INCL CAD: CPT | Mod: TC

## 2019-01-04 NOTE — LETTER
2019    Migdalia Escalera  [address] [place] [facility] [address]            Dear Migdalia,    Your bone density test shows osteopenia. This is an intermediate category that is in between normal and osteoporosis.  People with osteopenia should work on taking in 6544-5085 mg of calcium with vitamin D daily. They should also be getting daily weight bearing exercise (walking works)       Below is a copy of the results.  It was a pleasure to see you at your last appointment.    If you have any questions or concerns, please call myself or my nurse at [phone].    Sincerely,    Kristen Kehr, PA-C / kvng    Results for orders placed or performed in visit on 19   DEXA HIP/PELVIS/SPINE - Future    Narrative    BONE DENSITOMETRY  [address] [place] [address]  2019      PATIENT: Migdalia Escalera  CHART: [id number]   :  1955  AGE:  63 year old  SEX:  female   REFERRING PROVIDER:  Kristen Kehr, PA-C     PROCEDURE:  Bone density scanning was performed using DXA technology of   the lumbar spine and hip.  Scanning was performed on a Lunar Prodigy   scanner.  Reporting is completed in the form of a T-score.  The T-score   represents the standard deviation from peak bone mass based on a young   healthy adult.     REFERENCE T-SCORES:       Normal                -1.0 and greater                                 Osteopenia         Between -1.0 and -2.5                                             Osteoporosis     -2.5 and less                                       RISK FACTORS:  Early menopause before age 45, Regular alcohol intake of 3   drinks or more daily  CURRENT TREATMENT:  none listed     FINDINGS:               Lumbar Spine (L1-L4)      T-score:  0.5, degenerative changes   present               Left Femoral Neck            T-score:  -1.0               Right Femoral Neck          T-score:  -1.3                             Lumbar (L1-L4) BMD: 1.238            "                  Total Hip Mean BMD: 1.028      IMPRESSION  Osteopenia., Degenerative changes of the lumbar spine which may falsely   elevate results.    Recommendations include ensuring adequate Calcium and Vitamin D.    The current NOF Guidelines recommend treatment for patients with prior hip   or vertebral fracture, T-score -2.5 or below, or 10 year risk of any major   osteoporotic fracture >20% or 10 year risk of hip fracture >3%, as   calculated using the FRAX calculator (www.shef.ac.uk/FRAX or you can   google \"FRAX\").      This patient's risks based on available information, with the use of FRAX,   are 9.1 % for major osteoporotic fracture and 1.0 % for hip fracture.   Based on these guidelines, treatment (in addition to calcium and vitamin   D) is not recommended for this patient, after ruling out other causes of   osteoporosis.  This is meant as an aid to clinical decision making; one must still use   clinical judgement.      Follow up can be considered in 5 years.   ___________________  Karuna Burgos M.D.  Electronically signed                          "

## 2019-01-08 NOTE — RESULT ENCOUNTER NOTE
Please send a letter with results of bone density test and the following:    Migdalia,    The bone density test shows osteopenia. This is an intermediate category that is in between normal and osteoporosis.  People with osteopenia should work on taking in 0894-5899 mg of calcium with vitamin D daily. They should also be getting daily weight bearing exercise (walking works)      If you have any questions or concerns, please contact the clinic at [phone].    Thank you,        Kristen Kehr PA-C

## 2019-01-16 ENCOUNTER — OFFICE VISIT (OUTPATIENT)
Dept: FAMILY MEDICINE | Facility: CLINIC | Age: 64
End: 2019-01-16
Payer: COMMERCIAL

## 2019-01-16 VITALS — SYSTOLIC BLOOD PRESSURE: 168 MMHG | HEART RATE: 84 BPM | TEMPERATURE: 97.9 F | DIASTOLIC BLOOD PRESSURE: 70 MMHG

## 2019-01-16 DIAGNOSIS — I10 BENIGN ESSENTIAL HYPERTENSION: Primary | ICD-10-CM

## 2019-01-16 DIAGNOSIS — I10 ESSENTIAL HYPERTENSION WITH GOAL BLOOD PRESSURE LESS THAN 140/90: ICD-10-CM

## 2019-01-16 PROCEDURE — 99214 OFFICE O/P EST MOD 30 MIN: CPT | Performed by: FAMILY MEDICINE

## 2019-01-16 RX ORDER — AMLODIPINE BESYLATE 2.5 MG/1
2.5 TABLET ORAL DAILY
Qty: 90 TABLET | Refills: 1 | Status: SHIPPED | OUTPATIENT
Start: 2019-01-16 | End: 2019-02-12

## 2019-01-16 NOTE — PROGRESS NOTES
SUBJECTIVE:   Migdalia Escalera is a 63 year old female who presents to clinic today for the following health issues:      Medication Followup of  amlodpine    Taking Medication as prescribed: yes    Side Effects:  None    Medication Helping Symptoms:  Unsure      Doesn't check at home  BP elevated today but asymptomatic. No headache no blurring of vision no chest pain no shortness of breath no dizziness no unsteadiness no numbness no weakness  Has cut down and trying to adhere to a low salt diet  Has been trying to do the elliptical.  She states she is compliant with meds.     Allergies   Allergen Reactions     Amoxicillin      Erythromycin        Past Medical History:   Diagnosis Date     High cholesterol      HTN      Hypothyroid      Pulmonary actinomycosis (H)      Seasonal allergies          Current Outpatient Medications on File Prior to Visit:  albuterol (PROAIR HFA, PROVENTIL HFA, VENTOLIN HFA) 108 (90 BASE) MCG/ACT inhaler Inhale 2 puffs into the lungs every 4 hours as needed for shortness of breath / dyspnea or wheezing Use with spacer   aspirin 81 MG tablet Take 1 tablet by mouth daily.   furosemide (LASIX) 20 MG tablet Take 1 tablet (20 mg) by mouth daily   levothyroxine (SYNTHROID/LEVOTHROID) 137 MCG tablet Take 1 tablet (137 mcg) by mouth daily   losartan-hydrochlorothiazide (HYZAAR) 100-25 MG tablet Take 1 tablet by mouth daily   metoprolol succinate ER (TOPROL-XL) 100 MG 24 hr tablet Take 1 tablet (100 mg) by mouth daily   order for DME Equipment being ordered: compression stockings / mild compression   silver sulfADIAZINE (SILVADENE) 1 % cream Apply topically 2 times daily   simvastatin (ZOCOR) 40 MG tablet Take 1 tablet (40 mg) by mouth At Bedtime   Spacer/Aero-Holding Chambers (SPACER/AERO-HOLD CHAMBER MASK) KOTA 1 Adult size spacer to use with MDI inhalers.   valACYclovir (VALTREX) 1000 mg tablet Take 2 tablets (2,000 mg) by mouth 2 times daily   [DISCONTINUED] amLODIPine (NORVASC) 2.5 MG tablet  Take 1 tablet (2.5 mg) by mouth daily     No current facility-administered medications on file prior to visit.     Social History     Tobacco Use     Smoking status: Former Smoker     Types: Cigarettes     Last attempt to quit: 2005     Years since quittin.1     Smokeless tobacco: Never Used     Tobacco comment: no 2nd hand smoke exposure   Substance Use Topics     Alcohol use: Yes     Comment: occ.     Drug use: No       ROS:  review of systems negative except for noted above.   No thoughts of harming self or others.     OBJECTIVE:  /70   Pulse 84   Temp 97.9  F (36.6  C) (Oral)    General:   awake, alert, and cooperative.  NAD.   Head: Normocephalic, atraumatic.  Eyes: Conjunctiva clear,   Heart: Regular rate and rhythm. No murmur.  Lungs: Chest is clear; no wheezes or rales.   Neuro: Alert and oriented - normal speech.  MS: Using extremities freely  PSYCH:  Normal affect, normal speech. No thoughts of harming self or others. Feels safe   SKIN: no obvious rashes    ASSESSMENT:    ICD-10-CM    1. Benign essential hypertension I10 Basic metabolic panel   2. Essential hypertension with goal blood pressure less than 140/90 I10 amLODIPine (NORVASC) 2.5 MG tablet       PLAN:   BP is more elevated now than it was last time.  She states she's feeling stressed as we were running behind seeing her.   She denies symptoms.    I am concerned that she's not responding as expected and would like to recheck her kidney function  She is in a rush today and states she will just schedule a lab appointment    BP not at goal I want to increase amlodipine to 5mg daily.   She is very hesitant to do so.   She states she will recheck her BP at work first and if not at goal then she will double her 2.5mg (total 5mg) and then follow up with her primary care provider    She plans to follow up in a month    Your blood pressure reading was elevated today.  Recommend that you have it re-checked either at home or at our clinic  within a week  If your blood pressure top number (systolic) 180 and above, or the bottom number (diastolic) 120 and above, you need to be seen immediately.  If persistently elevated top number 140 and above, or the bottom number 90 and above, please schedule an appointment to see a provider in clinic within a week.  If you have very elevated blood pressures, accompanied by headache, chest pain, numbness, weakness, slurring of speech, confusion, difficulty walking, call 911 and go to the ER        Advised about symptoms which might herald more serious problems.        Kari Rm MD

## 2019-01-28 ENCOUNTER — DOCUMENTATION ONLY (OUTPATIENT)
Dept: FAMILY MEDICINE | Facility: CLINIC | Age: 64
End: 2019-01-28

## 2019-01-28 DIAGNOSIS — E78.5 HYPERLIPIDEMIA LDL GOAL <130: Primary | ICD-10-CM

## 2019-01-28 DIAGNOSIS — I10 ESSENTIAL HYPERTENSION WITH GOAL BLOOD PRESSURE LESS THAN 140/90: ICD-10-CM

## 2019-01-28 NOTE — PROGRESS NOTES
I have orders completed.   Please cancel the test from Dr. Rm.   This patient will be doing follow up with me and I ordered the bmp test so that results don't go to her.   Kristen Kehr PA-C

## 2019-01-28 NOTE — PROGRESS NOTES
Your patient scheduling notes state that she is coming in for orders from [facility] and you. I see an order from [employer] but none from you and she is not quite due for any lab test.  Please review.  Thank you,  Kelin

## 2019-01-29 DIAGNOSIS — I10 ESSENTIAL HYPERTENSION WITH GOAL BLOOD PRESSURE LESS THAN 140/90: ICD-10-CM

## 2019-01-29 DIAGNOSIS — E78.5 HYPERLIPIDEMIA LDL GOAL <130: ICD-10-CM

## 2019-01-29 LAB
ANION GAP SERPL CALCULATED.3IONS-SCNC: 8 MMOL/L (ref 3–14)
BUN SERPL-MCNC: 19 MG/DL (ref 7–30)
CALCIUM SERPL-MCNC: 8.9 MG/DL (ref 8.5–10.1)
CHLORIDE SERPL-SCNC: 103 MMOL/L (ref 94–109)
CHOLEST SERPL-MCNC: 202 MG/DL
CO2 SERPL-SCNC: 28 MMOL/L (ref 20–32)
CREAT SERPL-MCNC: 1.01 MG/DL (ref 0.52–1.04)
GFR SERPL CREATININE-BSD FRML MDRD: 58 ML/MIN/{1.73_M2}
GLUCOSE SERPL-MCNC: 110 MG/DL (ref 70–99)
HDLC SERPL-MCNC: 75 MG/DL
LDLC SERPL CALC-MCNC: 101 MG/DL
NONHDLC SERPL-MCNC: 127 MG/DL
POTASSIUM SERPL-SCNC: 4.1 MMOL/L (ref 3.4–5.3)
SODIUM SERPL-SCNC: 139 MMOL/L (ref 133–144)
TRIGL SERPL-MCNC: 131 MG/DL

## 2019-01-29 PROCEDURE — 80048 BASIC METABOLIC PNL TOTAL CA: CPT | Performed by: PHYSICIAN ASSISTANT

## 2019-01-29 PROCEDURE — 80061 LIPID PANEL: CPT | Performed by: PHYSICIAN ASSISTANT

## 2019-01-29 PROCEDURE — 36415 COLL VENOUS BLD VENIPUNCTURE: CPT | Performed by: PHYSICIAN ASSISTANT

## 2019-01-29 NOTE — RESULT ENCOUNTER NOTE
Please contact this patient to schedule an appointment to discuss lab results and medication. She is due for a recheck of her blood pressure since medication was adjusted by the same day provider. The recommendation was for her to be seen within the month.   Thank you. Kristen Kehr PA-C

## 2019-01-29 NOTE — LETTER
January 30, 2019    Migdalia Escalera  [address] [place] [id number]            Dear Migdalia,    Please schedule an appointment to be seen by your Primary Provider to discuss your lab results and Blood pressure sometime in February. Please disregard if you have already done this.  Below is a copy of the results.     If you have any questions or concerns, please call myself or my nurse at [phone].    Sincerely,    Kristen Kehr, PA-C / marielyr    Results for orders placed or performed in visit on 01/29/19   Lipid panel reflex to direct LDL Fasting   Result Value Ref Range    Cholesterol 202 (H) <200 mg/dL    Triglycerides 131 <150 mg/dL    HDL Cholesterol 75 >49 mg/dL    LDL Cholesterol Calculated 101 (H) <100 mg/dL    Non HDL Cholesterol 127 <130 mg/dL   **Basic metabolic panel FUTURE anytime   Result Value Ref Range    Sodium 139 133 - 144 mmol/L    Potassium 4.1 3.4 - 5.3 mmol/L    Chloride 103 94 - 109 mmol/L    Carbon Dioxide 28 20 - 32 mmol/L    Anion Gap 8 3 - 14 mmol/L    Glucose 110 (H) 70 - 99 mg/dL    Urea Nitrogen 19 7 - 30 mg/dL    Creatinine 1.01 0.52 - 1.04 mg/dL    GFR Estimate 58 (L) >60 mL/min/[1.73_m2]    GFR Estimate If Black 67 >60 mL/min/[1.73_m2]    Calcium 8.9 8.5 - 10.1 mg/dL

## 2019-02-12 ENCOUNTER — OFFICE VISIT (OUTPATIENT)
Dept: FAMILY MEDICINE | Facility: CLINIC | Age: 64
End: 2019-02-12
Payer: COMMERCIAL

## 2019-02-12 VITALS
WEIGHT: 227 LBS | TEMPERATURE: 98.3 F | BODY MASS INDEX: 36.48 KG/M2 | DIASTOLIC BLOOD PRESSURE: 76 MMHG | HEART RATE: 75 BPM | OXYGEN SATURATION: 96 % | SYSTOLIC BLOOD PRESSURE: 130 MMHG | HEIGHT: 66 IN

## 2019-02-12 DIAGNOSIS — E03.9 HYPOTHYROIDISM, UNSPECIFIED TYPE: ICD-10-CM

## 2019-02-12 DIAGNOSIS — I10 ESSENTIAL HYPERTENSION WITH GOAL BLOOD PRESSURE LESS THAN 140/90: Primary | ICD-10-CM

## 2019-02-12 PROCEDURE — 99213 OFFICE O/P EST LOW 20 MIN: CPT | Performed by: PHYSICIAN ASSISTANT

## 2019-02-12 RX ORDER — AMLODIPINE BESYLATE 2.5 MG/1
5 TABLET ORAL DAILY
Qty: 90 TABLET | Refills: 1 | COMMUNITY
Start: 2019-02-12 | End: 2019-04-15

## 2019-02-12 ASSESSMENT — MIFFLIN-ST. JEOR: SCORE: 1583.48

## 2019-02-12 NOTE — PROGRESS NOTES
SUBJECTIVE:   Migdalia Escalera is a 63 year old female who presents to clinic today for the following health issues:    Migdalia is here today for recheck of blood pressure.   She was seen in January and has increased the amlodipine to 5 mg daily.   She continues with the metoprolol, losartan / hydrochlorothiazide also.       History of Present Illness     Hypertension:     Outpatient blood pressures:  Are not being checked    Dietary sodium intake::  Low salt diet    Problem list and histories reviewed & adjusted, as indicated.  Additional history: as documented      Patient Active Problem List   Diagnosis     Hyperlipidemia LDL goal <130     Advanced directives, counseling/discussion     H/O: hysterectomy     Vitamin D deficiency     Hypothyroidism, unspecified hypothyroidism type     History of colonic polyps     Essential hypertension with goal blood pressure less than 140/90     Breast hypertrophy     Obesity (BMI 35.0-39.9) with comorbidity (H)     Asymptomatic postmenopausal status     Past Surgical History:   Procedure Laterality Date     HYSTERECTOMY, PAP NO LONGER INDICATED       SURGICAL HISTORY OF -       thoracic spine       Social History     Tobacco Use     Smoking status: Former Smoker     Types: Cigarettes     Last attempt to quit: 2005     Years since quittin.2     Smokeless tobacco: Never Used     Tobacco comment: no 2nd hand smoke exposure   Substance Use Topics     Alcohol use: Yes     Comment: occ.     Family History   Problem Relation Age of Onset     Arthritis Mother      Prostate Cancer Father      Hypertension Father      Cancer Father         bone     Diabetes Maternal Grandfather      Thyroid Disease Sister          Current Outpatient Medications   Medication Sig Dispense Refill     albuterol (PROAIR HFA, PROVENTIL HFA, VENTOLIN HFA) 108 (90 BASE) MCG/ACT inhaler Inhale 2 puffs into the lungs every 4 hours as needed for shortness of breath / dyspnea or wheezing Use with spacer 1  "Inhaler 0     amLODIPine (NORVASC) 2.5 MG tablet Take 2 tablets (5 mg) by mouth daily 90 tablet 1     aspirin 81 MG tablet Take 1 tablet by mouth daily.       CALCIUM PO        furosemide (LASIX) 20 MG tablet Take 1 tablet (20 mg) by mouth daily 30 tablet 1     levothyroxine (SYNTHROID/LEVOTHROID) 137 MCG tablet Take 1 tablet (137 mcg) by mouth daily 90 tablet 3     losartan-hydrochlorothiazide (HYZAAR) 100-25 MG tablet Take 1 tablet by mouth daily 90 tablet 1     metoprolol succinate ER (TOPROL-XL) 100 MG 24 hr tablet Take 1 tablet (100 mg) by mouth daily 90 tablet 1     order for DME Equipment being ordered: compression stockings / mild compression 1 Units 3     silver sulfADIAZINE (SILVADENE) 1 % cream Apply topically 2 times daily 400 g 0     simvastatin (ZOCOR) 40 MG tablet Take 1 tablet (40 mg) by mouth At Bedtime 90 tablet 3     Spacer/Aero-Holding Chambers (SPACER/AERO-HOLD CHAMBER MASK) KOTA 1 Adult size spacer to use with MDI inhalers. 1 each 0     valACYclovir (VALTREX) 1000 mg tablet Take 2 tablets (2,000 mg) by mouth 2 times daily 4 tablet 1     Allergies   Allergen Reactions     Amoxicillin      Erythromycin      BP Readings from Last 3 Encounters:   02/12/19 130/76   01/16/19 168/70   12/13/18 146/86    Wt Readings from Last 3 Encounters:   02/12/19 103 kg (227 lb)   12/13/18 106.6 kg (235 lb)   10/14/18 102.5 kg (226 lb)                    ROS:  Constitutional, HEENT, cardiovascular, pulmonary, GI, , musculoskeletal, neuro, skin, endocrine and psych systems are negative, except as otherwise noted.    OBJECTIVE:     /76   Pulse 75   Temp 98.3  F (36.8  C) (Oral)   Ht 1.664 m (5' 5.5\")   Wt 103 kg (227 lb)   SpO2 96%   BMI 37.20 kg/m    Body mass index is 37.2 kg/m .  GENERAL: healthy, alert and no distress  RESP: lungs clear to auscultation - no rales, rhonchi or wheezes  CV: regular rate and rhythm, normal S1 S2, no S3 or S4, no murmur, click or rub, no peripheral edema and peripheral " pulses strong  MS: no gross musculoskeletal defects noted, no edema  NEURO: Normal strength and tone, mentation intact and speech normal  PSYCH: mentation appears normal, affect normal/bright    Diagnostic Test Results:  Results for orders placed or performed in visit on 01/29/19   Lipid panel reflex to direct LDL Fasting   Result Value Ref Range    Cholesterol 202 (H) <200 mg/dL    Triglycerides 131 <150 mg/dL    HDL Cholesterol 75 >49 mg/dL    LDL Cholesterol Calculated 101 (H) <100 mg/dL    Non HDL Cholesterol 127 <130 mg/dL   **Basic metabolic panel FUTURE anytime   Result Value Ref Range    Sodium 139 133 - 144 mmol/L    Potassium 4.1 3.4 - 5.3 mmol/L    Chloride 103 94 - 109 mmol/L    Carbon Dioxide 28 20 - 32 mmol/L    Anion Gap 8 3 - 14 mmol/L    Glucose 110 (H) 70 - 99 mg/dL    Urea Nitrogen 19 7 - 30 mg/dL    Creatinine 1.01 0.52 - 1.04 mg/dL    GFR Estimate 58 (L) >60 mL/min/[1.73_m2]    GFR Estimate If Black 67 >60 mL/min/[1.73_m2]    Calcium 8.9 8.5 - 10.1 mg/dL       ASSESSMENT/PLAN:       1. Essential hypertension with goal blood pressure less than 140/90  Changes made on her medication list with the amlodipine increased to 5 mg daily.   She declines a refill at this time to have a 5 mg tablet, but will contact the clinic when she is due.   All other medications were refilled at her previous appointment.   She will follow up in 6 months and will be due for a thyroid check at that time.   - amLODIPine (NORVASC) 2.5 MG tablet; Take 2 tablets (5 mg) by mouth daily  Dispense: 90 tablet; Refill: 1      Reviewed health maintenance also and she is due for pneumococcal and shingles vaccine. She declines both.     Kristen M. Kehr, PA-C  [facility]

## 2019-04-12 DIAGNOSIS — I10 ESSENTIAL HYPERTENSION WITH GOAL BLOOD PRESSURE LESS THAN 140/90: ICD-10-CM

## 2019-04-12 RX ORDER — AMLODIPINE BESYLATE 2.5 MG/1
TABLET ORAL
Qty: 90 TABLET | Refills: 0 | OUTPATIENT
Start: 2019-04-12

## 2019-04-15 ENCOUNTER — ANCILLARY PROCEDURE (OUTPATIENT)
Dept: GENERAL RADIOLOGY | Facility: CLINIC | Age: 64
End: 2019-04-15
Attending: FAMILY MEDICINE
Payer: COMMERCIAL

## 2019-04-15 ENCOUNTER — OFFICE VISIT (OUTPATIENT)
Dept: FAMILY MEDICINE | Facility: CLINIC | Age: 64
End: 2019-04-15
Payer: COMMERCIAL

## 2019-04-15 VITALS
TEMPERATURE: 98.2 F | OXYGEN SATURATION: 97 % | WEIGHT: 238 LBS | DIASTOLIC BLOOD PRESSURE: 85 MMHG | SYSTOLIC BLOOD PRESSURE: 180 MMHG | BODY MASS INDEX: 39 KG/M2 | HEART RATE: 81 BPM

## 2019-04-15 DIAGNOSIS — I10 ESSENTIAL HYPERTENSION WITH GOAL BLOOD PRESSURE LESS THAN 140/90: ICD-10-CM

## 2019-04-15 DIAGNOSIS — G89.29 CHRONIC PAIN OF LEFT KNEE: ICD-10-CM

## 2019-04-15 DIAGNOSIS — M25.562 CHRONIC PAIN OF LEFT KNEE: ICD-10-CM

## 2019-04-15 DIAGNOSIS — M17.12 PRIMARY OSTEOARTHRITIS OF LEFT KNEE: ICD-10-CM

## 2019-04-15 DIAGNOSIS — E66.01 MORBID OBESITY (H): ICD-10-CM

## 2019-04-15 DIAGNOSIS — M25.562 CHRONIC PAIN OF LEFT KNEE: Primary | ICD-10-CM

## 2019-04-15 DIAGNOSIS — M71.22 BAKER'S CYST OF KNEE, LEFT: ICD-10-CM

## 2019-04-15 DIAGNOSIS — G89.29 CHRONIC PAIN OF LEFT KNEE: Primary | ICD-10-CM

## 2019-04-15 PROCEDURE — 73560 X-RAY EXAM OF KNEE 1 OR 2: CPT | Mod: LT

## 2019-04-15 PROCEDURE — 99213 OFFICE O/P EST LOW 20 MIN: CPT | Performed by: FAMILY MEDICINE

## 2019-04-15 RX ORDER — AMLODIPINE BESYLATE 2.5 MG/1
5 TABLET ORAL DAILY
Qty: 180 TABLET | Refills: 2 | Status: SHIPPED | OUTPATIENT
Start: 2019-04-15 | End: 2020-01-20

## 2019-04-15 NOTE — TELEPHONE ENCOUNTER
Note: Pt has no refills remaining. Patient filled 1-16-19 and 2-27-19 as a 45 day supply with sig 2 tabs per day.

## 2019-04-15 NOTE — PROGRESS NOTES
SUBJECTIVE:  Migdalia Escalera is a 64 year old female who presents to the clinic today for bilateral knee pain. left worse than the right   Onset of symptoms:   months ago.  History of injury: none  Associated symptoms:swelling  Location of pain: medial and posterior  Symptoms are: Markedly worsening  History of serious knee problems: no  Medications and therapies tried: none  helped  What makes it worse: kneeling and stairs    Past Medical, social, family histories, medications, and allergies reviewed and updated    OBJECTIVE:  Blood pressure 180/85, pulse 81, temperature 98.2  F (36.8  C), temperature source Oral, weight 108 kg (238 lb), SpO2 97 %, not currently breastfeeding.  Patient appears to be in alert and in no apparent distress distress  KNEE EXAM (left)  Effusion: yes  Erythema: no  Patellar tenderness: no  Medial joint line tenderness: yes  Lateral joint line tenderness: no  Lachman's test: negative  Tri's maneuver: positive  Valgus:negative  Varus:negative  range of motion: full  Calves soft non-tender.  Neurovascularly Intact Distally.      X-rays: joint space narrowing medially    ICD-10-CM    1. Chronic pain of left knee M25.562 XR Knee Standing Left 2 Views    G89.29    2. Baker's cyst of knee, left M71.22    3. Primary osteoarthritis of left knee M17.12    4. Morbid obesity (H) E66.01     PLAN:NSAIDs

## 2019-04-18 ENCOUNTER — OFFICE VISIT (OUTPATIENT)
Dept: ORTHOPEDICS | Facility: CLINIC | Age: 64
End: 2019-04-18
Payer: COMMERCIAL

## 2019-04-18 VITALS
OXYGEN SATURATION: 98 % | HEIGHT: 66 IN | SYSTOLIC BLOOD PRESSURE: 144 MMHG | DIASTOLIC BLOOD PRESSURE: 54 MMHG | BODY MASS INDEX: 38.25 KG/M2 | HEART RATE: 82 BPM | WEIGHT: 238 LBS

## 2019-04-18 DIAGNOSIS — M79.604 PAIN IN BOTH LOWER EXTREMITIES: Primary | ICD-10-CM

## 2019-04-18 DIAGNOSIS — M17.12 PRIMARY OSTEOARTHRITIS OF LEFT KNEE: ICD-10-CM

## 2019-04-18 DIAGNOSIS — E03.8 OTHER SPECIFIED HYPOTHYROIDISM: ICD-10-CM

## 2019-04-18 DIAGNOSIS — E78.5 HYPERLIPIDEMIA LDL GOAL <130: ICD-10-CM

## 2019-04-18 DIAGNOSIS — M79.605 PAIN IN BOTH LOWER EXTREMITIES: Primary | ICD-10-CM

## 2019-04-18 PROCEDURE — 99243 OFF/OP CNSLTJ NEW/EST LOW 30: CPT | Performed by: ORTHOPAEDIC SURGERY

## 2019-04-18 RX ORDER — SIMVASTATIN 40 MG
40 TABLET ORAL AT BEDTIME
Qty: 90 TABLET | Refills: 2 | Status: SHIPPED | OUTPATIENT
Start: 2019-04-18 | End: 2020-01-07

## 2019-04-18 ASSESSMENT — MIFFLIN-ST. JEOR: SCORE: 1628.37

## 2019-04-18 NOTE — PATIENT INSTRUCTIONS
Try anti-inflammatories with food (ibuprofen/Advil or naproxen/Aleve).    Ice the left knee    Try a knee sleeve.    Do your own massaging to the areas.     Physical therapy ordered today.

## 2019-04-18 NOTE — TELEPHONE ENCOUNTER
Left message on answering machine for patient to call back to [phone].  Dacia Elias BSN, RN

## 2019-04-18 NOTE — LETTER
4/18/2019         RE: Migdalia Escalera  [address] [facility] [id number]        Dear Colleague,    Thank you for referring your patient, Migdalia Escalera, to the [facility]. Please see a copy of my visit note below.    SUBJECTIVE:   Migdalia Escalera is a 64 year old female who is seen in consultation at the request of Dr. Lindquist for evaluation of bilateral knee pain that has been present approximately 2 months. No known injury. Pain is located in bilateral anterior thighs, knees, and legs. Diffuse pain throughout. She reports mild stiffness in the knees, which have worsened. Overall aching pain, which is worsened with weightbearing and exercising activities. Reports her pain comes on after activities. Denies episodes of giving out. No treatments.     Present symptoms: thigh, knee and leg pain  Symptoms occur when: weightbearing and elliptical machine     Treatments tried to this point: none    Orthopedic PMH: no history of low back problems.     Review of Systems:  Constitutional:  NEGATIVE for fever, chills, change in weight  Integumentary/Skin:  NEGATIVE for worrisome rashes, moles or lesions  Eyes:  NEGATIVE for vision changes or irritation  ENT/Mouth:  NEGATIVE for ear, mouth and throat problems  Resp:  NEGATIVE for significant cough or SOB  Breast:  NEGATIVE for masses, tenderness or discharge  CV:  NEGATIVE for chest pain, palpitations or peripheral edema  GI:  NEGATIVE for nausea, abdominal pain, heartburn, or change in bowel habits  :  Negative   Musculoskeletal:  See HPI above  Neuro:  NEGATIVE for weakness, dizziness or paresthesias  Endocrine:  NEGATIVE for temperature intolerance, skin/hair changes  Heme/allergy/immune:  NEGATIVE for bleeding problems  Psychiatric:  NEGATIVE for changes in mood or affect    Past Medical History:   Past Medical History:   Diagnosis Date     High cholesterol      HTN      Hypothyroid      Pulmonary actinomycosis (H)      Seasonal allergies   "    Past Surgical History:   Past Surgical History:   Procedure Laterality Date     HYSTERECTOMY, PAP NO LONGER INDICATED       SURGICAL HISTORY OF -       thoracic spine     Family History:   Family History   Problem Relation Age of Onset     Arthritis Mother      Prostate Cancer Father      Hypertension Father      Cancer Father         bone     Diabetes Maternal Grandfather      Thyroid Disease Sister      Social History:   Social History     Tobacco Use     Smoking status: Former Smoker     Types: Cigarettes     Last attempt to quit: 2005     Years since quittin.3     Smokeless tobacco: Never Used     Tobacco comment: no 2nd hand smoke exposure   Substance Use Topics     Alcohol use: Yes     Comment: occ.     This document serves as a record of the services and decisions personally performed and made by CRISS Eckert MD. It was created on his behalf by Kavya Gallagher, a trained medical scribe. The creation of this document is based the provider's statements to the medical scribe.    Scribe Kavya Gallagher 8:15 AM 2019      OBJECTIVE:  Physical Exam:  /54 (BP Location: Right arm, Patient Position: Sitting, Cuff Size: Adult Regular)   Pulse 82   Ht 1.664 m (5' 5.5\")   Wt 108 kg (238 lb)   SpO2 98%   BMI 39.00 kg/m     General Appearance: healthy, alert and no distress   Skin: no suspicious lesions or rashes  Neuro: Normal strength and tone, mentation intact and speech normal  Vascular: good pulses, and capillary refill   Lymph: no lymphadenopathy   Psych:  mentation appears normal and affect normal/bright  Resp: no increased work of breathing     Bilateral Knee Exam:  Gait: walks with normal gait  Squat: 100 % painfree.   Pes cavus bilaterally  Good lumbar range of motion with no pain.  No pain with hip range of motion bilaterally     Left Knee Right Knee   Alignment normal  normal    Patellofemoral Joint no crepitations no crepitations   Effusion mild none   ROM Grossly full range of motion "  Grossly full range of motion    Tender Diffuse tenderness, myofascial tenderness Medial facet of patella, medial and lateral joint lines, diffuse myofascial pain throughout the leg, including posteriorly    Ligaments Lachman's: stable  Anterior/Posterior Drawer: stable  Varus/Valgus stress: stable Lachman's: stable  Anterior/Posterior Drawer: stable  Varus/Valgus stress: stable   Straight leg raise  Seated: Negative   Supine: Negative  Seated: Negative   Supine: Negative    McMurrays negative negative     X-rays:  Obtained x-rays of the left knee: 2-views, reviewed in the office with the patient by myself today and show mild medial joint space narrowing.        ASSESSMENT:   Encounter Diagnoses   Name Primary?     Pain in both lower extremities Yes     Primary osteoarthritis of left knee--mild    She seems to have normal function of the knees on exam.    PLAN:   We discussed the distribution of her pain and sensitivity to myofascial palpation. I advised she try anti-inflammatories, ice the left knee, or try a knee sleeve. Also discussed massaging her legs if this helps. Physical therapy was ordered today. Follow up as needed.    Corticosteroid injection could be tried in the left knee in the future  I did not appreciate a popliteal cyst on exam, but if present, I don't think it would be the cause of her anterior leg pain.    Return to clinic: as needed     The information in this document, created by a scribe for me, accurately reflects the services I personally performed and the decisions made by me. I have reviewed and approved this document for accuracy.      SAIDA Eckert MD  Dept. Orthopedic Surgery  [facility]         Procedures      Again, thank you for allowing me to participate in the care of your patient.        Sincerely,        Abner Eckert MD

## 2019-04-18 NOTE — TELEPHONE ENCOUNTER
Pt due for thyroid lab test but last ov states to come back in Aug for this.  To provider to advise.  Dacia Elias BSN, RN

## 2019-04-18 NOTE — TELEPHONE ENCOUNTER
Please have her come in for a lab only appointment for thyroid test and I will extend a 30 day prescription until she has the test done. Future order is already there. After the appointment is made, then send the prescription. Thank you.   Kristen Kehr PA-C

## 2019-04-18 NOTE — PROGRESS NOTES
SUBJECTIVE:   Migdalia Escalera is a 64 year old female who is seen in consultation at the request of Dr. Lindquist for evaluation of bilateral knee pain that has been present approximately 2 months. No known injury. Pain is located in bilateral anterior thighs, knees, and legs. Diffuse pain throughout. She reports mild stiffness in the knees, which have worsened. Overall aching pain, which is worsened with weightbearing and exercising activities. Reports her pain comes on after activities. Denies episodes of giving out. No treatments.     Present symptoms: thigh, knee and leg pain  Symptoms occur when: weightbearing and elliptical machine     Treatments tried to this point: none    Orthopedic PMH: no history of low back problems.     Review of Systems:  Constitutional:  NEGATIVE for fever, chills, change in weight  Integumentary/Skin:  NEGATIVE for worrisome rashes, moles or lesions  Eyes:  NEGATIVE for vision changes or irritation  ENT/Mouth:  NEGATIVE for ear, mouth and throat problems  Resp:  NEGATIVE for significant cough or SOB  Breast:  NEGATIVE for masses, tenderness or discharge  CV:  NEGATIVE for chest pain, palpitations or peripheral edema  GI:  NEGATIVE for nausea, abdominal pain, heartburn, or change in bowel habits  :  Negative   Musculoskeletal:  See HPI above  Neuro:  NEGATIVE for weakness, dizziness or paresthesias  Endocrine:  NEGATIVE for temperature intolerance, skin/hair changes  Heme/allergy/immune:  NEGATIVE for bleeding problems  Psychiatric:  NEGATIVE for changes in mood or affect    Past Medical History:   Past Medical History:   Diagnosis Date     High cholesterol      HTN      Hypothyroid      Pulmonary actinomycosis (H)      Seasonal allergies      Past Surgical History:   Past Surgical History:   Procedure Laterality Date     HYSTERECTOMY, PAP NO LONGER INDICATED       SURGICAL HISTORY OF -       thoracic spine     Family History:   Family History   Problem Relation Age of Onset      "Arthritis Mother      Prostate Cancer Father      Hypertension Father      Cancer Father         bone     Diabetes Maternal Grandfather      Thyroid Disease Sister      Social History:   Social History     Tobacco Use     Smoking status: Former Smoker     Types: Cigarettes     Last attempt to quit: 2005     Years since quittin.3     Smokeless tobacco: Never Used     Tobacco comment: no 2nd hand smoke exposure   Substance Use Topics     Alcohol use: Yes     Comment: occ.     This document serves as a record of the services and decisions personally performed and made by CRISS Eckert MD. It was created on his behalf by Kavya Gallagher, a trained medical scribe. The creation of this document is based the provider's statements to the medical scribe.    Scribe Kavya Gallagher 8:15 AM 2019      OBJECTIVE:  Physical Exam:  /54 (BP Location: Right arm, Patient Position: Sitting, Cuff Size: Adult Regular)   Pulse 82   Ht 1.664 m (5' 5.5\")   Wt 108 kg (238 lb)   SpO2 98%   BMI 39.00 kg/m    General Appearance: healthy, alert and no distress   Skin: no suspicious lesions or rashes  Neuro: Normal strength and tone, mentation intact and speech normal  Vascular: good pulses, and capillary refill   Lymph: no lymphadenopathy   Psych:  mentation appears normal and affect normal/bright  Resp: no increased work of breathing     Bilateral Knee Exam:  Gait: walks with normal gait  Squat: 100 % painfree.   Pes cavus bilaterally  Good lumbar range of motion with no pain.  No pain with hip range of motion bilaterally     Left Knee Right Knee   Alignment normal  normal    Patellofemoral Joint no crepitations no crepitations   Effusion mild none   ROM Grossly full range of motion  Grossly full range of motion    Tender Diffuse tenderness, myofascial tenderness Medial facet of patella, medial and lateral joint lines, diffuse myofascial pain throughout the leg, including posteriorly    Ligaments Lachman's: " stable  Anterior/Posterior Drawer: stable  Varus/Valgus stress: stable Lachman's: stable  Anterior/Posterior Drawer: stable  Varus/Valgus stress: stable   Straight leg raise  Seated: Negative   Supine: Negative  Seated: Negative   Supine: Negative    McMurrays negative negative     X-rays:  Obtained x-rays of the left knee: 2-views, reviewed in the office with the patient by myself today and show mild medial joint space narrowing.        ASSESSMENT:   Encounter Diagnoses   Name Primary?     Pain in both lower extremities Yes     Primary osteoarthritis of left knee--mild    She seems to have normal function of the knees on exam.    PLAN:   We discussed the distribution of her pain and sensitivity to myofascial palpation. I advised she try anti-inflammatories, ice the left knee, or try a knee sleeve. Also discussed massaging her legs if this helps. Physical therapy was ordered today. Follow up as needed.    Corticosteroid injection could be tried in the left knee in the future  I did not appreciate a popliteal cyst on exam, but if present, I don't think it would be the cause of her anterior leg pain.    Return to clinic: as needed     The information in this document, created by a scribe for me, accurately reflects the services I personally performed and the decisions made by me. I have reviewed and approved this document for accuracy.      SAIDA Eckert MD  Dept. Orthopedic Surgery  [facility]

## 2019-04-19 RX ORDER — LEVOTHYROXINE SODIUM 137 UG/1
137 TABLET ORAL DAILY
Qty: 30 TABLET | Refills: 0 | Status: SHIPPED | OUTPATIENT
Start: 2019-04-19 | End: 2019-04-30

## 2019-04-19 NOTE — TELEPHONE ENCOUNTER
Pt notified of provider message as written. Pt scheduled lab appointment.  Refill sent.  Dacia LEUNGN, RN

## 2019-04-26 DIAGNOSIS — E03.9 HYPOTHYROIDISM, UNSPECIFIED TYPE: ICD-10-CM

## 2019-04-26 LAB
T4 FREE SERPL-MCNC: 1.3 NG/DL (ref 0.76–1.46)
TSH SERPL DL<=0.005 MIU/L-ACNC: 0.37 MU/L (ref 0.4–4)

## 2019-04-26 PROCEDURE — 36415 COLL VENOUS BLD VENIPUNCTURE: CPT | Performed by: PHYSICIAN ASSISTANT

## 2019-04-26 PROCEDURE — 84443 ASSAY THYROID STIM HORMONE: CPT | Performed by: PHYSICIAN ASSISTANT

## 2019-04-26 PROCEDURE — 84439 ASSAY OF FREE THYROXINE: CPT | Performed by: PHYSICIAN ASSISTANT

## 2019-04-30 ENCOUNTER — TELEPHONE (OUTPATIENT)
Dept: FAMILY MEDICINE | Facility: CLINIC | Age: 64
End: 2019-04-30

## 2019-04-30 DIAGNOSIS — E03.8 OTHER SPECIFIED HYPOTHYROIDISM: ICD-10-CM

## 2019-04-30 RX ORDER — LEVOTHYROXINE SODIUM 125 UG/1
125 TABLET ORAL DAILY
Qty: 90 TABLET | Refills: 0 | Status: SHIPPED | OUTPATIENT
Start: 2019-04-30 | End: 2019-07-19

## 2019-04-30 NOTE — TELEPHONE ENCOUNTER
Left message on answering machine for patient/parent to call back.   [phone].  Alexia Aguirre RN

## 2019-04-30 NOTE — TELEPHONE ENCOUNTER
Left a message to return a call to [phone].  [phone] until 7 tonight. Marina Gutierrez R.N.

## 2019-04-30 NOTE — TELEPHONE ENCOUNTER
Please contact Migdalia and let her know that I am going to adjust her thyroid medication to 125 mcg daily.   Recheck her thyroid again in 2 months.   Kristen Kehr PA-C

## 2019-05-01 NOTE — TELEPHONE ENCOUNTER
Patient/parent is informed of MD note below, as it is written. Verbalized good understanding.  An appointment is schedule for 7/1/19.  Alexia Aguirre RN

## 2019-05-02 ENCOUNTER — THERAPY VISIT (OUTPATIENT)
Dept: PHYSICAL THERAPY | Facility: CLINIC | Age: 64
End: 2019-05-02
Attending: ORTHOPAEDIC SURGERY
Payer: COMMERCIAL

## 2019-05-02 DIAGNOSIS — M76.32 IT BAND SYNDROME, LEFT: ICD-10-CM

## 2019-05-02 DIAGNOSIS — M25.562 LEFT KNEE PAIN: ICD-10-CM

## 2019-05-02 DIAGNOSIS — M79.605 PAIN IN BOTH LOWER EXTREMITIES: ICD-10-CM

## 2019-05-02 DIAGNOSIS — M79.604 PAIN IN BOTH LOWER EXTREMITIES: ICD-10-CM

## 2019-05-02 DIAGNOSIS — M17.12 PRIMARY OSTEOARTHRITIS OF LEFT KNEE: ICD-10-CM

## 2019-05-02 PROCEDURE — 97161 PT EVAL LOW COMPLEX 20 MIN: CPT | Mod: GP | Performed by: PHYSICAL THERAPIST

## 2019-05-02 PROCEDURE — 97110 THERAPEUTIC EXERCISES: CPT | Mod: GP | Performed by: PHYSICAL THERAPIST

## 2019-05-02 PROCEDURE — 97530 THERAPEUTIC ACTIVITIES: CPT | Mod: GP | Performed by: PHYSICAL THERAPIST

## 2019-05-02 ASSESSMENT — ACTIVITIES OF DAILY LIVING (ADL)
KNEE_ACTIVITY_OF_DAILY_LIVING_SUM: 46
RISE FROM A CHAIR: ACTIVITY IS SOMEWHAT DIFFICULT
GIVING WAY, BUCKLING OR SHIFTING OF KNEE: I HAVE THE SYMPTOM BUT IT DOES NOT AFFECT MY ACTIVITY
PAIN: THE SYMPTOM AFFECTS MY ACTIVITY SLIGHTLY
SWELLING: I DO NOT HAVE THE SYMPTOM
WEAKNESS: THE SYMPTOM AFFECTS MY ACTIVITY SLIGHTLY
STAND: ACTIVITY IS SOMEWHAT DIFFICULT
STIFFNESS: THE SYMPTOM AFFECTS MY ACTIVITY SLIGHTLY
KNEEL ON THE FRONT OF YOUR KNEE: ACTIVITY IS MINIMALLY DIFFICULT
RAW_SCORE: 46
GO UP STAIRS: ACTIVITY IS SOMEWHAT DIFFICULT
LIMPING: THE SYMPTOM AFFECTS MY ACTIVITY SLIGHTLY
GO DOWN STAIRS: ACTIVITY IS SOMEWHAT DIFFICULT
WALK: ACTIVITY IS SOMEWHAT DIFFICULT
SIT WITH YOUR KNEE BENT: ACTIVITY IS SOMEWHAT DIFFICULT
SQUAT: ACTIVITY IS SOMEWHAT DIFFICULT
KNEE_ACTIVITY_OF_DAILY_LIVING_SCORE: 65.71

## 2019-05-02 NOTE — PROGRESS NOTES
[facility] Initial Evaluation  Subjective:    Migdalia Escalera is a 64 year old female with a left knee condition.  Condition occurred with:  Insidious onset.  Condition occurred: for unknown reasons.  This is a chronic condition  Patient reports the onset of left knee and lower leg pain in December 2018 without a specific mechanism of injury or change in daily routine..    Patient reports pain:  Anterior.  Radiates to:  Knee.  Pain is described as aching and is constant and reported as 3/10.  Associated symptoms:  Loss of motion/stiffness and loss of strength. Pain is worse during the day.  Symptoms are exacerbated by activity and relieved by rest.  Since onset symptoms are gradually improving.  Special tests:  X-ray (see epic).      General health as reported by patient is good.                      Red flags:  None as reported by the patient.                        Objective:  System                                           Hip Evaluation  HIP AROM:  AROM:    Left Hip:     Normal    Right Hip:   Normal                    Hip Strength:      Extension:  Left: 4-/5  Pain:  Abduction:  Left: 4-/5     Pain:      External Rotation:  Left: 4-/5   Pain:                       Knee Evaluation:  ROM:  AROM: normal              Ligament Testing:  Normal                Special Tests:   Left knee positive for the following special tests:  Meniscal (OA) and IT Band Friction  Right knee positive for the following tests:  Meniscal (OA)  Palpation:  Palpation of knee: greater troch.  Left knee tenderness present at:  Medial Joint Line; Lateral Joint Line; IT Band and Gluteus Medius  Right knee tenderness present at:  Medial Joint Line  Edema:  Normal    Mobility Testing:  Normal            Functional Testing:          Quad:    Single Leg Squat:  Left:      Right:        Bilateral Leg Squat:   Moderate loss of control, femoral IR and excessive anterior knee excursion              General      ROS    Assessment/Plan:    Patient is a 64 year old female with left side knee complaints.    Patient has the following significant findings with corresponding treatment plan.                Diagnosis 1:  Left knee pain; left lower extremity pain  Pain -  hot/cold therapy, manual therapy, self management, education and home program  Decreased strength - therapeutic exercise, therapeutic activities and home program  Inflammation - cold therapy and self management/home program  Impaired muscle performance - neuro re-education and home program  Decreased function - therapeutic activities and home program    Therapy Evaluation Codes:   1) History comprised of:   Personal factors that impact the plan of care:      None.    Comorbidity factors that impact the plan of care are:      Overweight.     Medications impacting care: None.  2) Examination of Body Systems comprised of:   Body structures and functions that impact the plan of care:      Knee.   Activity limitations that impact the plan of care are:      Squatting/kneeling, Stairs and Walking.  3) Clinical presentation characteristics are:   Stable/Uncomplicated.  4) Decision-Making    Low complexity using standardized patient assessment instrument and/or measureable assessment of functional outcome.  Cumulative Therapy Evaluation is: Low complexity.    Previous and current functional limitations:  (See Goal Flow Sheet for this information)    Short term and Long term goals: (See Goal Flow Sheet for this information)     Communication ability:  Patient appears to be able to clearly communicate and understand verbal and written communication and follow directions correctly.  Treatment Explanation - The following has been discussed with the patient:   RX ordered/plan of care  Anticipated outcomes  Possible risks and side effects  This patient would benefit from PT intervention to resume normal activities.   Rehab potential is good.    Frequency:  1 X week, once  daily  Duration:  for 6 weeks  Discharge Plan:  Achieve all LTG.  Independent in home treatment program.  Reach maximal therapeutic benefit.    Please refer to the daily flowsheet for treatment today, total treatment time and time spent performing 1:1 timed codes.

## 2019-05-02 NOTE — LETTER
SUJIT RAMON PT  [address] [facility] [address] [facility]  [address]  [place] [address]  [phone]    May 3, 2019    Re: Migdalia Escalera   :   1955  MRN:  [id number]   REFERRING PHYSICIAN:   MD SUJIT Gandhi PT  Date of Initial Evaluation:  2019  Visits:  Rxs Used: 1  Reason for Referral:     Pain in both lower extremities  Primary osteoarthritis of left knee  Left knee pain  It band syndrome, left    EVALUATION SUMMARY    [facility] Initial Evaluation  Subjective:  Migdalia Escalera is a 64 year old female with a left knee condition.  Condition occurred with:  Insidious onset.  Condition occurred: for unknown reasons.  This is a chronic condition  Patient reports the onset of left knee and lower leg pain in 2018 without a specific mechanism of injury or change in daily routine..    Patient reports pain:  Anterior.  Radiates to:  Knee.  Pain is described as aching and is constant and reported as 3/10.  Associated symptoms:  Loss of motion/stiffness and loss of strength. Pain is worse during the day.  Symptoms are exacerbated by activity and relieved by rest.  Since onset symptoms are gradually improving.  Special tests:  X-ray (see epic).  General health as reported by patient is good.                  Red flags:  None as reported by the patient.    Objective:  Hip Evaluation  HIP AROM:  AROM:    Left Hip:     Normal    Right Hip:   Normal      Hip Strength:    Extension:  Left: 4-/5  Pain:  Abduction:  Left: 4-/5     Pain:  External Rotation:  Left: 4-/5   Pain:         Knee Evaluation:  ROM:  AROM: normal    Ligament Testing:  Normal        Re: Migdalia Escalera   :   1955    Special Tests:   Left knee positive for the following special tests:  Meniscal (OA) and IT Band Friction  Right knee positive for the following tests:  Meniscal (OA)    Palpation:  Palpation of knee: greater troch.  Left knee tenderness present at:  Medial Joint Line; Lateral Joint  Line; IT Band and Gluteus Medius  Right knee tenderness present at:  Medial Joint Line    Edema:  Normal  Mobility Testing:  Normal    Functional Testing:    Quad:    Single Leg Squat:  Left:      Right:        Bilateral Leg Squat:   Moderate loss of control, femoral IR and excessive anterior knee excursion      Assessment/Plan:    Patient is a 64 year old female with left side knee complaints.    Patient has the following significant findings with corresponding treatment plan.                Diagnosis 1:  Left knee pain; left lower extremity pain  Pain -  hot/cold therapy, manual therapy, self management, education and home program  Decreased strength - therapeutic exercise, therapeutic activities and home program  Inflammation - cold therapy and self management/home program  Impaired muscle performance - neuro re-education and home program  Decreased function - therapeutic activities and home program    Therapy Evaluation Codes:   1) History comprised of:   Personal factors that impact the plan of care:      None.    Comorbidity factors that impact the plan of care are:      Overweight.     Medications impacting care: None.  2) Examination of Body Systems comprised of:   Body structures and functions that impact the plan of care:      Knee.   Activity limitations that impact the plan of care are:      Squatting/kneeling, Stairs and Walking.  3) Clinical presentation characteristics are:   Stable/Uncomplicated.  4) Decision-Making    Low complexity using standardized patient assessment instrument and/or measureable assessment of functional outcome.  Cumulative Therapy Evaluation is: Low complexity.      Re: Migdalia Escalera   :   1955    Previous and current functional limitations:  (See Goal Flow Sheet for this information)    Short term and Long term goals: (See Goal Flow Sheet for this information)     Communication ability:  Patient appears to be able to clearly communicate and understand verbal and  written communication and follow directions correctly.  Treatment Explanation - The following has been discussed with the patient:   RX ordered/plan of care  Anticipated outcomes  Possible risks and side effects  This patient would benefit from PT intervention to resume normal activities.   Rehab potential is good.    Frequency:  1 X week, once daily  Duration:  for 6 weeks  Discharge Plan:  Achieve all LTG.  Independent in home treatment program.  Reach maximal therapeutic benefit.    Please refer to the daily flowsheet for treatment today, total treatment time and time spent performing 1:1 timed codes.       Thank you for your referral.    INQUIRIES  Therapist: ANALY Kaminski PT  [address]  [address]  [place] [address]  Phone: [phone]  Fax: [phone]

## 2019-05-02 NOTE — PROGRESS NOTES
[facility] Initial Evaluation  Subjective:                                     General health as reported by patient is good.  Pertinent medical history includes:  High blood pressure, numbness/tingling, overweight and thyroid problems.  Medical allergies: no.  Other surgeries include:  Other.  Current medications:  High blood pressure medication, thyroid medication and other.  Current occupation is sales.    Primary job tasks include:  Prolonged sitting and other.                                Objective:  System    Physical Exam    General     ROS    Assessment/Plan:

## 2019-05-09 ENCOUNTER — THERAPY VISIT (OUTPATIENT)
Dept: PHYSICAL THERAPY | Facility: CLINIC | Age: 64
End: 2019-05-09
Payer: COMMERCIAL

## 2019-05-09 DIAGNOSIS — M76.32 IT BAND SYNDROME, LEFT: ICD-10-CM

## 2019-05-09 DIAGNOSIS — M25.562 LEFT KNEE PAIN: ICD-10-CM

## 2019-05-09 PROCEDURE — 97530 THERAPEUTIC ACTIVITIES: CPT | Mod: GP | Performed by: PHYSICAL THERAPIST

## 2019-05-09 PROCEDURE — 97110 THERAPEUTIC EXERCISES: CPT | Mod: GP | Performed by: PHYSICAL THERAPIST

## 2019-05-16 ENCOUNTER — THERAPY VISIT (OUTPATIENT)
Dept: PHYSICAL THERAPY | Facility: CLINIC | Age: 64
End: 2019-05-16
Payer: COMMERCIAL

## 2019-05-16 DIAGNOSIS — M76.32 IT BAND SYNDROME, LEFT: ICD-10-CM

## 2019-05-16 DIAGNOSIS — M25.562 LEFT KNEE PAIN: ICD-10-CM

## 2019-05-16 PROCEDURE — 97530 THERAPEUTIC ACTIVITIES: CPT | Mod: GP | Performed by: PHYSICAL THERAPIST

## 2019-05-16 PROCEDURE — 97110 THERAPEUTIC EXERCISES: CPT | Mod: GP | Performed by: PHYSICAL THERAPIST

## 2019-05-16 ASSESSMENT — ACTIVITIES OF DAILY LIVING (ADL)
HOW_WOULD_YOU_RATE_THE_OVERALL_FUNCTION_OF_YOUR_KNEE_DURING_YOUR_USUAL_DAILY_ACTIVITIES?: NORMAL
WEAKNESS: I DO NOT HAVE THE SYMPTOM
LIMPING: I DO NOT HAVE THE SYMPTOM
STIFFNESS: I DO NOT HAVE THE SYMPTOM
PAIN: I DO NOT HAVE THE SYMPTOM
KNEE_ACTIVITY_OF_DAILY_LIVING_SUM: 70
RISE FROM A CHAIR: ACTIVITY IS NOT DIFFICULT
SWELLING: I DO NOT HAVE THE SYMPTOM
SQUAT: ACTIVITY IS NOT DIFFICULT
KNEEL ON THE FRONT OF YOUR KNEE: ACTIVITY IS NOT DIFFICULT
GO DOWN STAIRS: ACTIVITY IS NOT DIFFICULT
WALK: ACTIVITY IS NOT DIFFICULT
GIVING WAY, BUCKLING OR SHIFTING OF KNEE: I DO NOT HAVE THE SYMPTOM
HOW_WOULD_YOU_RATE_THE_CURRENT_FUNCTION_OF_YOUR_KNEE_DURING_YOUR_USUAL_DAILY_ACTIVITIES_ON_A_SCALE_FROM_0_TO_100_WITH_100_BEING_YOUR_LEVEL_OF_KNEE_FUNCTION_PRIOR_TO_YOUR_INJURY_AND_0_BEING_THE_INABILITY_TO_PERFORM_ANY_OF_YOUR_USUAL_DAILY_ACTIVITIES?: 5
RAW_SCORE: 70
KNEE_ACTIVITY_OF_DAILY_LIVING_SCORE: 100
GO UP STAIRS: ACTIVITY IS NOT DIFFICULT
STAND: ACTIVITY IS NOT DIFFICULT
SIT WITH YOUR KNEE BENT: ACTIVITY IS NOT DIFFICULT

## 2019-05-16 NOTE — PROGRESS NOTES
[facility] Initial Evaluation  Subjective:  HPI                    Objective:  System    Physical Exam    General     ROS    Assessment/Plan:    DISCHARGE REPORT      SUBJECTIVE  Subjective changes noted by patient:   Subjective: Patient reports working for much longer in the garden on saturday and her legs were sore on sunday but resolved on Monday. Patient is tolerating her exercises well and is tolerating more activity than before with less pain    Current pain level is  Current Pain level: 0/10.     Previous pain level was   Initial Pain level: 3/10.   Changes in function:  Yes (See Goal flowsheet attached for changes in current functional level)  Adverse reaction to treatment or activity: None    OBJECTIVE  Changes noted in objective findings:  Yes,   Objective: Unremarkable knee exam     ASSESSMENT/PLAN  Updated problem list and treatment plan: Diagnosis 1:  Left knee pain    STG/LTGs have been met or progress has been made towards goals:  Yes (See Goal flow sheet completed today.)  Assessment of Progress: The patient's condition is improving.  The patient has met all of their long term goals.  Self Management Plans:  Patient is independent in a home treatment program.  Patient is independent in self management of symptoms.  I have re-evaluated this patient and find that the nature, scope, duration and intensity of the therapy is appropriate for the medical condition of the patient.  Shilpauriel continues to require the following intervention to meet STG and LTG's:  PT intervention is no longer required to meet STG/LTG.    Recommendations:  This patient is ready to be discharged from therapy and continue their home treatment program.    Please refer to the daily flowsheet for treatment today, total treatment time and time spent performing 1:1 timed codes.

## 2019-07-01 DIAGNOSIS — E03.8 OTHER SPECIFIED HYPOTHYROIDISM: ICD-10-CM

## 2019-07-01 LAB
T4 FREE SERPL-MCNC: 1 NG/DL (ref 0.76–1.46)
TSH SERPL DL<=0.005 MIU/L-ACNC: 4.3 MU/L (ref 0.4–4)

## 2019-07-01 PROCEDURE — 84443 ASSAY THYROID STIM HORMONE: CPT | Performed by: PHYSICIAN ASSISTANT

## 2019-07-01 PROCEDURE — 36415 COLL VENOUS BLD VENIPUNCTURE: CPT | Performed by: PHYSICIAN ASSISTANT

## 2019-07-01 PROCEDURE — 84439 ASSAY OF FREE THYROXINE: CPT | Performed by: PHYSICIAN ASSISTANT

## 2019-07-01 NOTE — LETTER
July 3, 2019    Migdalia Escalera  [address] [place] [id number]            Dear Migdalia,    Your thyroid result looks good. I am going to have you continue at your current dose of levothyroxine.     Have a great 4th of July.     Below is a copy of the results.  It was a pleasure to see you at your last appointment.    If you have any questions or concerns, please call myself or my nurse at [phone].    Sincerely,    Kristen Kehr, PA-C / kvng    Results for orders placed or performed in visit on 07/01/19   **TSH with free T4 reflex FUTURE 2mo   Result Value Ref Range    TSH 4.30 (H) 0.40 - 4.00 mU/L   T4 free   Result Value Ref Range    T4 Free 1.00 0.76 - 1.46 ng/dL

## 2019-07-03 ENCOUNTER — TELEPHONE (OUTPATIENT)
Dept: FAMILY MEDICINE | Facility: CLINIC | Age: 64
End: 2019-07-03

## 2019-07-03 NOTE — TELEPHONE ENCOUNTER
Panel Management Review      Patient has the following on her problem list:     Hypertension   Last three blood pressure readings:  BP Readings from Last 3 Encounters:   04/18/19 144/54   04/15/19 180/85   02/12/19 130/76     Blood pressure: MONITOR    HTN Guidelines:  Less than 140/90      Composite cancer screening  Chart review shows that this patient is due/due soon for the following None  Summary:    Patient is due/failing the following:   BP CHECK    Action needed:   None needed, patient has an appointment with Kristen Kehr PA-C on 08/15/19.    Type of outreach:    none    Questions for provider review:    None                                                                                                                                    Chandan DICKSON MA       Chart routed to close .

## 2019-07-03 NOTE — RESULT ENCOUNTER NOTE
Please send a letter to Migdalia with the following:    Migdalia,    Your thyroid result looks good. I am going to have you continue at your current dose of levothyroxine.     Have a great 4th of July.     If you have any questions or concerns, please contact the clinic at [phone].    Thank you,        Kristen Kehr PA-C

## 2019-07-17 DIAGNOSIS — I10 ESSENTIAL HYPERTENSION WITH GOAL BLOOD PRESSURE LESS THAN 140/90: ICD-10-CM

## 2019-07-17 DIAGNOSIS — E03.8 OTHER SPECIFIED HYPOTHYROIDISM: ICD-10-CM

## 2019-07-17 NOTE — LETTER
July 19, 2019    Migdalia Escalera  [address] [place] [phone]    Dear Migdalia,       We recently received a refill request for metoprolol and levothyroxine.  We have refilled this for a one time 30 day supply only because you are due for a:    Hypertension and thyroid office visit and fasting lab appointment      Please schedule this lab appointment 4-5 days prior to the office visit.     Please call at your earliest convenience so that there will not be a delay with your future refills.          Thank you,   Your [facility] Team/  [phone]

## 2019-07-19 RX ORDER — LEVOTHYROXINE SODIUM 125 UG/1
125 TABLET ORAL DAILY
Qty: 30 TABLET | Refills: 0 | Status: SHIPPED | OUTPATIENT
Start: 2019-07-19 | End: 2019-08-15

## 2019-07-19 RX ORDER — METOPROLOL SUCCINATE 100 MG/1
TABLET, EXTENDED RELEASE ORAL
Qty: 30 TABLET | Refills: 0 | Status: SHIPPED | OUTPATIENT
Start: 2019-07-19 | End: 2019-08-15

## 2019-07-19 NOTE — TELEPHONE ENCOUNTER
Prescription approved per [facility] Refill Protocol #30  APPT NEEDED FOR FURTHER REFILLS  Please help the pt schedule an appointment hypertension, thyroid.    Health Maintenance Due   Topic Date Due     ZOSTER IMMUNIZATION (1 of 2) 04/12/2003     MEDICARE ANNUAL WELLNESS VISIT  04/12/2018     FALL RISK ASSESSMENT  04/12/2018     PNEUMOCOCCAL IMMUNIZATION 65+ LOW/MEDIUM RISK (1 of 2 - PCV13) 04/12/2018     PHQ-2  01/01/2019     CMP  03/29/2019     Alexia Aguirre RN

## 2019-07-25 ENCOUNTER — DOCUMENTATION ONLY (OUTPATIENT)
Dept: LAB | Facility: CLINIC | Age: 64
End: 2019-07-25

## 2019-07-26 DIAGNOSIS — I10 ESSENTIAL HYPERTENSION WITH GOAL BLOOD PRESSURE LESS THAN 140/90: ICD-10-CM

## 2019-07-26 RX ORDER — LOSARTAN POTASSIUM AND HYDROCHLOROTHIAZIDE 25; 100 MG/1; MG/1
1 TABLET ORAL DAILY
Qty: 30 TABLET | Refills: 0 | Status: SHIPPED | OUTPATIENT
Start: 2019-07-26 | End: 2019-08-15

## 2019-07-26 NOTE — TELEPHONE ENCOUNTER
Next 5 appointments (look out 90 days)    Aug 15, 2019  8:00 AM CDT  Office Visit with Kristen M Kehr, PA-C  [facility] [address] [facility] [address] [phone]

## 2019-07-30 ENCOUNTER — DOCUMENTATION ONLY (OUTPATIENT)
Dept: LAB | Facility: CLINIC | Age: 64
End: 2019-07-30

## 2019-07-30 DIAGNOSIS — I10 ESSENTIAL HYPERTENSION WITH GOAL BLOOD PRESSURE LESS THAN 140/90: Primary | ICD-10-CM

## 2019-07-30 NOTE — PROGRESS NOTES
Patient has a upcoming lab appt for non-fasting labs for you. At this time we do not have any orders placed. Please review and place orders. Thank you.     An lab    Alison Ramirez on 7/30/2019 at 1:32 PM

## 2019-08-08 DIAGNOSIS — I10 ESSENTIAL HYPERTENSION WITH GOAL BLOOD PRESSURE LESS THAN 140/90: ICD-10-CM

## 2019-08-08 LAB
ALBUMIN SERPL-MCNC: 3.7 G/DL (ref 3.4–5)
ALP SERPL-CCNC: 94 U/L (ref 40–150)
ALT SERPL W P-5'-P-CCNC: 28 U/L (ref 0–50)
ANION GAP SERPL CALCULATED.3IONS-SCNC: 7 MMOL/L (ref 3–14)
AST SERPL W P-5'-P-CCNC: 26 U/L (ref 0–45)
BILIRUB SERPL-MCNC: 0.4 MG/DL (ref 0.2–1.3)
BUN SERPL-MCNC: 16 MG/DL (ref 7–30)
CALCIUM SERPL-MCNC: 9.9 MG/DL (ref 8.5–10.1)
CHLORIDE SERPL-SCNC: 103 MMOL/L (ref 94–109)
CO2 SERPL-SCNC: 30 MMOL/L (ref 20–32)
CREAT SERPL-MCNC: 0.95 MG/DL (ref 0.52–1.04)
GFR SERPL CREATININE-BSD FRML MDRD: 62 ML/MIN/{1.73_M2}
GLUCOSE SERPL-MCNC: 98 MG/DL (ref 70–99)
POTASSIUM SERPL-SCNC: 4.3 MMOL/L (ref 3.4–5.3)
PROT SERPL-MCNC: 7.5 G/DL (ref 6.8–8.8)
SODIUM SERPL-SCNC: 140 MMOL/L (ref 133–144)

## 2019-08-08 PROCEDURE — 36415 COLL VENOUS BLD VENIPUNCTURE: CPT | Performed by: PHYSICIAN ASSISTANT

## 2019-08-08 PROCEDURE — 80053 COMPREHEN METABOLIC PANEL: CPT | Performed by: PHYSICIAN ASSISTANT

## 2019-08-08 NOTE — LETTER
August 8, 2019    Migdalia Escalera  [address] [place] [phone]            Dear Migdalia,    The results of your recent tests were normal.  Below is a copy of the results.  It was a pleasure to see you at your last appointment.    If you have any questions or concerns, please call myself or my nurse at [phone].    Sincerely,    Kristen Kehr, PA-C / kvng    Results for orders placed or performed in visit on 08/08/19   **Comprehensive metabolic panel FUTURE anytime   Result Value Ref Range    Sodium 140 133 - 144 mmol/L    Potassium 4.3 3.4 - 5.3 mmol/L    Chloride 103 94 - 109 mmol/L    Carbon Dioxide 30 20 - 32 mmol/L    Anion Gap 7 3 - 14 mmol/L    Glucose 98 70 - 99 mg/dL    Urea Nitrogen 16 7 - 30 mg/dL    Creatinine 0.95 0.52 - 1.04 mg/dL    GFR Estimate 62 >60 mL/min/[1.73_m2]    GFR Estimate If Black 72 >60 mL/min/[1.73_m2]    Calcium 9.9 8.5 - 10.1 mg/dL    Bilirubin Total 0.4 0.2 - 1.3 mg/dL    Albumin 3.7 3.4 - 5.0 g/dL    Protein Total 7.5 6.8 - 8.8 g/dL    Alkaline Phosphatase 94 40 - 150 U/L    ALT 28 0 - 50 U/L    AST 26 0 - 45 U/L

## 2019-08-15 ENCOUNTER — OFFICE VISIT (OUTPATIENT)
Dept: FAMILY MEDICINE | Facility: CLINIC | Age: 64
End: 2019-08-15
Payer: COMMERCIAL

## 2019-08-15 VITALS
BODY MASS INDEX: 37.53 KG/M2 | OXYGEN SATURATION: 98 % | WEIGHT: 229 LBS | DIASTOLIC BLOOD PRESSURE: 68 MMHG | TEMPERATURE: 97.9 F | SYSTOLIC BLOOD PRESSURE: 118 MMHG | HEART RATE: 71 BPM

## 2019-08-15 DIAGNOSIS — I10 ESSENTIAL HYPERTENSION WITH GOAL BLOOD PRESSURE LESS THAN 140/90: ICD-10-CM

## 2019-08-15 DIAGNOSIS — E03.8 OTHER SPECIFIED HYPOTHYROIDISM: ICD-10-CM

## 2019-08-15 DIAGNOSIS — B00.1 RECURRENT COLD SORES: ICD-10-CM

## 2019-08-15 PROCEDURE — 99213 OFFICE O/P EST LOW 20 MIN: CPT | Performed by: PHYSICIAN ASSISTANT

## 2019-08-15 RX ORDER — METOPROLOL SUCCINATE 100 MG/1
TABLET, EXTENDED RELEASE ORAL
Qty: 90 TABLET | Refills: 1 | Status: SHIPPED | OUTPATIENT
Start: 2019-08-15 | End: 2020-02-10

## 2019-08-15 RX ORDER — VALACYCLOVIR HYDROCHLORIDE 1 G/1
TABLET, FILM COATED ORAL
Qty: 30 TABLET | Refills: 1 | Status: SHIPPED | OUTPATIENT
Start: 2019-08-15 | End: 2020-02-13

## 2019-08-15 RX ORDER — LOSARTAN POTASSIUM AND HYDROCHLOROTHIAZIDE 25; 100 MG/1; MG/1
1 TABLET ORAL DAILY
Qty: 90 TABLET | Refills: 1 | Status: SHIPPED | OUTPATIENT
Start: 2019-08-15 | End: 2020-02-13

## 2019-08-15 RX ORDER — LEVOTHYROXINE SODIUM 125 UG/1
125 TABLET ORAL DAILY
Qty: 90 TABLET | Refills: 3 | Status: SHIPPED | OUTPATIENT
Start: 2019-08-15 | End: 2020-02-13

## 2019-08-15 ASSESSMENT — PAIN SCALES - GENERAL: PAINLEVEL: NO PAIN (0)

## 2019-08-15 NOTE — NURSING NOTE
"Chief Complaint   Patient presents with     Hypertension       Initial /68   Pulse 71   Temp 97.9  F (36.6  C) (Oral)   Wt 103.9 kg (229 lb)   SpO2 98%   BMI 37.53 kg/m   Estimated body mass index is 37.53 kg/m  as calculated from the following:    Height as of 4/18/19: 1.664 m (5' 5.5\").    Weight as of this encounter: 103.9 kg (229 lb).  Medication Reconciliation: complete    HORTENCIA Garcia MA    "

## 2019-08-15 NOTE — PROGRESS NOTES
Subjective     Migdalia Escalera is a 64 year old female who presents to clinic today for the following health issues:    HPI   Hypertension Follow-up      Do you check your blood pressure regularly outside of the clinic? No     Are you following a low salt diet? Yes    Are your blood pressures ever more than 140 on the top number (systolic) OR more   than 90 on the bottom number (diastolic), for example 140/90? Not checking      How many servings of fruits and vegetables do you eat daily?      On average, how many sweetened beverages do you drink each day (soda, juice, sweet tea, etc)?       How many days per week do you miss taking your medication? 0          Patient Active Problem List   Diagnosis     Hyperlipidemia LDL goal <130     Advanced directives, counseling/discussion     H/O: hysterectomy     Vitamin D deficiency     Hypothyroidism, unspecified hypothyroidism type     History of colonic polyps     Essential hypertension with goal blood pressure less than 140/90     Breast hypertrophy     Obesity (BMI 35.0-39.9) with comorbidity (H)     Asymptomatic postmenopausal status     Hypothyroid     Left knee pain     It band syndrome, left     Past Surgical History:   Procedure Laterality Date     HYSTERECTOMY, PAP NO LONGER INDICATED       SURGICAL HISTORY OF -       thoracic spine       Social History     Tobacco Use     Smoking status: Former Smoker     Types: Cigarettes     Last attempt to quit: 2005     Years since quittin.7     Smokeless tobacco: Never Used     Tobacco comment: no 2nd hand smoke exposure   Substance Use Topics     Alcohol use: Yes     Comment: occ.     Family History   Problem Relation Age of Onset     Arthritis Mother      Prostate Cancer Father      Hypertension Father      Cancer Father         bone     Diabetes Maternal Grandfather      Thyroid Disease Sister          Current Outpatient Medications   Medication Sig Dispense Refill     amLODIPine (NORVASC) 2.5 MG tablet Take 2  tablets (5 mg) by mouth daily 180 tablet 2     aspirin 81 MG tablet Take 1 tablet by mouth daily.       CALCIUM PO        levothyroxine (SYNTHROID/LEVOTHROID) 125 MCG tablet Take 1 tablet (125 mcg) by mouth daily 90 tablet 3     losartan-hydrochlorothiazide (HYZAAR) 100-25 MG tablet Take 1 tablet by mouth daily 90 tablet 1     metoprolol succinate ER (TOPROL-XL) 100 MG 24 hr tablet Take 1 tablet (100 mg) by mouth daily 90 tablet 1     order for DME Equipment being ordered: compression stockings / mild compression 1 Units 3     simvastatin (ZOCOR) 40 MG tablet Take 1 tablet (40 mg) by mouth At Bedtime 90 tablet 2     valACYclovir (VALTREX) 1000 mg tablet Take 2 tablets (2,000 mg) by mouth 2 times daily 30 tablet 1     albuterol (PROAIR HFA, PROVENTIL HFA, VENTOLIN HFA) 108 (90 BASE) MCG/ACT inhaler Inhale 2 puffs into the lungs every 4 hours as needed for shortness of breath / dyspnea or wheezing Use with spacer (Patient not taking: Reported on 8/15/2019) 1 Inhaler 0     furosemide (LASIX) 20 MG tablet Take 1 tablet (20 mg) by mouth daily (Patient not taking: Reported on 8/15/2019) 30 tablet 1     Allergies   Allergen Reactions     Amoxicillin      Erythromycin      BP Readings from Last 3 Encounters:   08/15/19 118/68   04/18/19 144/54   04/15/19 180/85    Wt Readings from Last 3 Encounters:   08/15/19 103.9 kg (229 lb)   04/18/19 108 kg (238 lb)   04/15/19 108 kg (238 lb)                    Reviewed and updated as needed this visit by Provider         Review of Systems   ROS COMP: Constitutional, HEENT, cardiovascular, pulmonary, GI, , musculoskeletal, neuro, skin, endocrine and psych systems are negative, except as otherwise noted.      Objective    /68   Pulse 71   Temp 97.9  F (36.6  C) (Oral)   Wt 103.9 kg (229 lb)   SpO2 98%   BMI 37.53 kg/m    Body mass index is 37.53 kg/m .  Physical Exam   GENERAL: healthy, alert and no distress  RESP: lungs clear to auscultation - no rales, rhonchi or  "wheezes  CV: regular rate and rhythm, normal S1 S2, no S3 or S4, no murmur, click or rub, no peripheral edema and peripheral pulses strong  MS: no gross musculoskeletal defects noted, no edema  SKIN: no suspicious lesions or rashes  PSYCH: mentation appears normal, affect normal/bright    Diagnostic Test Results:  Results for orders placed or performed in visit on 08/08/19   **Comprehensive metabolic panel FUTURE anytime   Result Value Ref Range    Sodium 140 133 - 144 mmol/L    Potassium 4.3 3.4 - 5.3 mmol/L    Chloride 103 94 - 109 mmol/L    Carbon Dioxide 30 20 - 32 mmol/L    Anion Gap 7 3 - 14 mmol/L    Glucose 98 70 - 99 mg/dL    Urea Nitrogen 16 7 - 30 mg/dL    Creatinine 0.95 0.52 - 1.04 mg/dL    GFR Estimate 62 >60 mL/min/[1.73_m2]    GFR Estimate If Black 72 >60 mL/min/[1.73_m2]    Calcium 9.9 8.5 - 10.1 mg/dL    Bilirubin Total 0.4 0.2 - 1.3 mg/dL    Albumin 3.7 3.4 - 5.0 g/dL    Protein Total 7.5 6.8 - 8.8 g/dL    Alkaline Phosphatase 94 40 - 150 U/L    ALT 28 0 - 50 U/L    AST 26 0 - 45 U/L           Assessment & Plan       ICD-10-CM    1. Essential hypertension with goal blood pressure less than 140/90 I10 metoprolol succinate ER (TOPROL-XL) 100 MG 24 hr tablet     losartan-hydrochlorothiazide (HYZAAR) 100-25 MG tablet   2. Other specified hypothyroidism E03.8 levothyroxine (SYNTHROID/LEVOTHROID) 125 MCG tablet   3. Recurrent cold sores B00.1 valACYclovir (VALTREX) 1000 mg tablet   all chronic stable conditions.   Declined all immunizations.      BMI:   Estimated body mass index is 37.53 kg/m  as calculated from the following:    Height as of 4/18/19: 1.664 m (5' 5.5\").    Weight as of this encounter: 103.9 kg (229 lb).   Weight management plan: Discussed healthy diet and exercise guidelines          Return in about 6 months (around 2/15/2020) for medication check, fasting lab appointment.    Kristen M. Kehr, PA-C  [facility]    "

## 2019-08-30 DIAGNOSIS — B00.1 RECURRENT COLD SORES: ICD-10-CM

## 2019-08-30 RX ORDER — VALACYCLOVIR HYDROCHLORIDE 1 G/1
TABLET, FILM COATED ORAL
Qty: 30 TABLET | Refills: 0 | OUTPATIENT
Start: 2019-08-30

## 2019-10-04 ENCOUNTER — TELEPHONE (OUTPATIENT)
Dept: FAMILY MEDICINE | Facility: CLINIC | Age: 64
End: 2019-10-04

## 2019-10-04 NOTE — LETTER
Migdalia Escalera  [address] [place] [id number]          October 4, 2019          Dear Migdalia Escalera      Our records indicate that you have not scheduled for a(n)Annual physical exam  which was recommended by your health care team. Monitoring and managing your preventative and chronic health conditions are very important to us.       If you have received your health care elsewhere, please provide us with that information so it can be documented in your chart.    Please call [phone] or message us through your [employer] account to schedule an appointment or provide information for your chart.     We look forward to seeing you and working with you on your health care needs.     Sincerely,   Kristen Kehr, PA-C/mariely          *If you have already scheduled an appointment, please disregard this reminder

## 2019-10-04 NOTE — TELEPHONE ENCOUNTER
Panel Management Review      Patient has the following on her problem list:     Hypertension   Last three blood pressure readings:  BP Readings from Last 3 Encounters:   08/15/19 118/68   04/18/19 144/54   04/15/19 180/85     Blood pressure: FAILED    HTN Guidelines:  Less than 140/90      Composite cancer screening  Chart review shows that this patient is due/due soon for the following None  Summary:    Patient is due/failing the following:   PHYSICAL    Action needed:   Patient needs office visit for physical.    Type of outreach:    Sent letter.    Questions for provider review:    None                                                                                                                                    Margaux Tatum MA on 10/4/2019 at 3:11 PM     Chart routed to sent letter .

## 2019-11-14 ENCOUNTER — TELEPHONE (OUTPATIENT)
Dept: FAMILY MEDICINE | Facility: CLINIC | Age: 64
End: 2019-11-14

## 2019-11-14 DIAGNOSIS — I10 ESSENTIAL HYPERTENSION WITH GOAL BLOOD PRESSURE LESS THAN 140/90: Primary | ICD-10-CM

## 2019-11-14 NOTE — TELEPHONE ENCOUNTER
Message: Hyzaar strength on back order. Please consider sending a new script for Hyzaar 50/12.5mg 2qd.

## 2019-11-14 NOTE — TELEPHONE ENCOUNTER
Per message from pharmacy, losartan-hydrochlorothiazide (HYZAAR) 100-25 MG tablet strength is on backorder.    RN pended the pharmacy-recommended alternative of Hyzaar 50/12.5mg - take 2 tablets by mouth daily. Please review and sign pending orders if appropriate.    Shyanne Trejo, [id number], RN

## 2019-11-15 RX ORDER — LOSARTAN POTASSIUM AND HYDROCHLOROTHIAZIDE 12.5; 5 MG/1; MG/1
2 TABLET ORAL DAILY
Qty: 180 TABLET | Refills: 0 | Status: SHIPPED | OUTPATIENT
Start: 2019-11-15 | End: 2020-02-13 | Stop reason: DRUGHIGH

## 2019-11-26 PROBLEM — M25.562 LEFT KNEE PAIN: Status: RESOLVED | Noted: 2019-05-02 | Resolved: 2019-11-26

## 2019-11-26 PROBLEM — M76.32 IT BAND SYNDROME, LEFT: Status: RESOLVED | Noted: 2019-05-02 | Resolved: 2019-11-26

## 2020-01-05 DIAGNOSIS — E78.5 HYPERLIPIDEMIA LDL GOAL <130: ICD-10-CM

## 2020-01-07 RX ORDER — SIMVASTATIN 40 MG
40 TABLET ORAL AT BEDTIME
Qty: 90 TABLET | Refills: 0 | Status: SHIPPED | OUTPATIENT
Start: 2020-01-07 | End: 2020-02-13

## 2020-01-07 NOTE — TELEPHONE ENCOUNTER
"Patient has upcoming/ pending appointment:    Next 5 appointments (look out 90 days)    Feb 13, 2020  8:40 AM CST  PHYSICAL with Kristen M Kehr, PA-C  [facility] [address] [facility] [address] [phone]          Rx refilled per [facility] refill protocol.    Alyssa LEUNGN, RN    Requested Prescriptions   Signed Prescriptions Disp Refills    simvastatin (ZOCOR) 40 MG tablet 90 tablet 0     Sig: Take 1 tablet (40 mg) by mouth At Bedtime       Statins Protocol Passed - 1/5/2020  7:01 AM        Passed - LDL on file in past 12 months     Recent Labs   Lab Test 01/29/19  0845   *             Passed - No abnormal creatine kinase in past 12 months     No lab results found.             Passed - Recent (12 mo) or future (30 days) visit within the authorizing provider's specialty     Patient has had an office visit with the authorizing provider or a provider within the authorizing providers department within the previous 12 mos or has a future within next 30 days. See \"Patient Info\" tab in inbasket, or \"Choose Columns\" in Meds & Orders section of the refill encounter.              Passed - Medication is active on med list        Passed - Patient is age 18 or older        Passed - No active pregnancy on record        Passed - No positive pregnancy test in past 12 months          "

## 2020-01-19 DIAGNOSIS — I10 ESSENTIAL HYPERTENSION WITH GOAL BLOOD PRESSURE LESS THAN 140/90: ICD-10-CM

## 2020-01-20 RX ORDER — AMLODIPINE BESYLATE 2.5 MG/1
5 TABLET ORAL DAILY
Qty: 180 TABLET | Refills: 0 | Status: SHIPPED | OUTPATIENT
Start: 2020-01-20 | End: 2020-02-13

## 2020-01-20 NOTE — TELEPHONE ENCOUNTER
"Patient has upcoming/ pending appointment:    Next 5 appointments (look out 90 days)    Feb 13, 2020  8:40 AM CST  PHYSICAL with Kristen M Kehr, PA-C  [facility] ([facility]) [address] [facility] [address] [phone]          Rx refilled per [facility] refill protocol.    Alyssa LEUNGN, RN      Requested Prescriptions   Signed Prescriptions Disp Refills    amLODIPine (NORVASC) 2.5 MG tablet 180 tablet 0     Sig: Take 2 tablets (5 mg) by mouth daily       Calcium Channel Blockers Protocol  Passed - 1/19/2020  7:02 AM        Passed - Blood pressure under 140/90 in past 12 months     BP Readings from Last 3 Encounters:   08/15/19 118/68   04/18/19 144/54   04/15/19 180/85                 Passed - Recent (12 mo) or future (30 days) visit within the authorizing provider's specialty     Patient has had an office visit with the authorizing provider or a provider within the authorizing providers department within the previous 12 mos or has a future within next 30 days. See \"Patient Info\" tab in inbasket, or \"Choose Columns\" in Meds & Orders section of the refill encounter.              Passed - Medication is active on med list        Passed - Patient is age 18 or older        Passed - No active pregnancy on record        Passed - Normal serum creatinine on file in past 12 months     Recent Labs   Lab Test 08/08/19  0806   CR 0.95             Passed - No positive pregnancy test in past 12 months          "

## 2020-01-31 ENCOUNTER — DOCUMENTATION ONLY (OUTPATIENT)
Dept: LAB | Facility: CLINIC | Age: 65
End: 2020-01-31

## 2020-01-31 DIAGNOSIS — E03.9 HYPOTHYROIDISM, UNSPECIFIED TYPE: ICD-10-CM

## 2020-01-31 DIAGNOSIS — E78.5 HYPERLIPIDEMIA LDL GOAL <130: Primary | ICD-10-CM

## 2020-01-31 DIAGNOSIS — I10 ESSENTIAL HYPERTENSION WITH GOAL BLOOD PRESSURE LESS THAN 140/90: ICD-10-CM

## 2020-01-31 NOTE — PROGRESS NOTES
Your patient has a lab appointment on 2/6/20 for pre-visit labs. At this time there are no orders placed for there lab appointment. Please review and sign orders prior to there appointment time. Thank you.    [facility]    Alison Ramirez CMA (Lab)

## 2020-02-03 NOTE — PROGRESS NOTES
Please review and sign Pending Pre-visit Labs in [facility]. Labs 02/06/20 and Physical 02/13/20   Karuna HILL

## 2020-02-06 DIAGNOSIS — E03.9 HYPOTHYROIDISM, UNSPECIFIED TYPE: ICD-10-CM

## 2020-02-06 DIAGNOSIS — I10 ESSENTIAL HYPERTENSION WITH GOAL BLOOD PRESSURE LESS THAN 140/90: ICD-10-CM

## 2020-02-06 DIAGNOSIS — E78.5 HYPERLIPIDEMIA LDL GOAL <130: ICD-10-CM

## 2020-02-06 LAB
ANION GAP SERPL CALCULATED.3IONS-SCNC: 5 MMOL/L (ref 3–14)
BUN SERPL-MCNC: 13 MG/DL (ref 7–30)
CALCIUM SERPL-MCNC: 9 MG/DL (ref 8.5–10.1)
CHLORIDE SERPL-SCNC: 109 MMOL/L (ref 94–109)
CHOLEST SERPL-MCNC: 179 MG/DL
CO2 SERPL-SCNC: 28 MMOL/L (ref 20–32)
CREAT SERPL-MCNC: 0.82 MG/DL (ref 0.52–1.04)
GFR SERPL CREATININE-BSD FRML MDRD: 74 ML/MIN/{1.73_M2}
GLUCOSE SERPL-MCNC: 97 MG/DL (ref 70–99)
HDLC SERPL-MCNC: 77 MG/DL
LDLC SERPL CALC-MCNC: 79 MG/DL
NONHDLC SERPL-MCNC: 102 MG/DL
POTASSIUM SERPL-SCNC: 4.1 MMOL/L (ref 3.4–5.3)
SODIUM SERPL-SCNC: 142 MMOL/L (ref 133–144)
TRIGL SERPL-MCNC: 116 MG/DL
TSH SERPL DL<=0.005 MIU/L-ACNC: 0.44 MU/L (ref 0.4–4)

## 2020-02-06 PROCEDURE — 36415 COLL VENOUS BLD VENIPUNCTURE: CPT | Performed by: PHYSICIAN ASSISTANT

## 2020-02-06 PROCEDURE — 80048 BASIC METABOLIC PNL TOTAL CA: CPT | Performed by: PHYSICIAN ASSISTANT

## 2020-02-06 PROCEDURE — 84443 ASSAY THYROID STIM HORMONE: CPT | Performed by: PHYSICIAN ASSISTANT

## 2020-02-06 PROCEDURE — 80061 LIPID PANEL: CPT | Performed by: PHYSICIAN ASSISTANT

## 2020-02-06 NOTE — LETTER
February 7, 2020      Migdalia Escalera  [address] [place] [id number]        Dear ,    We are writing to inform you of your test results.    Your test results fall within the expected range(s) or remain unchanged from previous results.  Please continue with current treatment plan.    Resulted Orders   **TSH with free T4 reflex FUTURE anytime   Result Value Ref Range    TSH 0.44 0.40 - 4.00 mU/L   **Basic metabolic panel FUTURE anytime   Result Value Ref Range    Sodium 142 133 - 144 mmol/L    Potassium 4.1 3.4 - 5.3 mmol/L    Chloride 109 94 - 109 mmol/L    Carbon Dioxide 28 20 - 32 mmol/L    Anion Gap 5 3 - 14 mmol/L    Glucose 97 70 - 99 mg/dL      Comment:      Fasting specimen    Urea Nitrogen 13 7 - 30 mg/dL    Creatinine 0.82 0.52 - 1.04 mg/dL    GFR Estimate 74 >60 mL/min/[1.73_m2]      Comment:      Non  GFR Calc  Starting 12/18/2018, serum creatinine based estimated GFR (eGFR) will be   calculated using the Chronic Kidney Disease Epidemiology Collaboration   (CKD-EPI) equation.      GFR Estimate If Black 86 >60 mL/min/[1.73_m2]      Comment:       GFR Calc  Starting 12/18/2018, serum creatinine based estimated GFR (eGFR) will be   calculated using the Chronic Kidney Disease Epidemiology Collaboration   (CKD-EPI) equation.      Calcium 9.0 8.5 - 10.1 mg/dL   Lipid panel reflex to direct LDL Fasting   Result Value Ref Range    Cholesterol 179 <200 mg/dL    Triglycerides 116 <150 mg/dL      Comment:      Fasting specimen    HDL Cholesterol 77 >49 mg/dL    LDL Cholesterol Calculated 79 <100 mg/dL      Comment:      Desirable:       <100 mg/dl    Non HDL Cholesterol 102 <130 mg/dL       If you have any questions or concerns, please call the clinic at the number listed above.       Sincerely,    Kristen Kehr, PA-C  / kvng

## 2020-02-10 DIAGNOSIS — I10 ESSENTIAL HYPERTENSION WITH GOAL BLOOD PRESSURE LESS THAN 140/90: ICD-10-CM

## 2020-02-10 RX ORDER — METOPROLOL SUCCINATE 100 MG/1
TABLET, EXTENDED RELEASE ORAL
Qty: 90 TABLET | Refills: 0 | Status: SHIPPED | OUTPATIENT
Start: 2020-02-10 | End: 2020-02-13

## 2020-02-10 NOTE — TELEPHONE ENCOUNTER
"Patient has upcoming/ pending appointment:    Next 5 appointments (look out 90 days)    Feb 13, 2020  8:40 AM CST  PHYSICAL with Kristen M Kehr, PA-C  [facility] [address] [facility] [address] [phone]          Rx refilled per [facility] refill protocol.    Alyssa LEUNGN, RN    Requested Prescriptions   Signed Prescriptions Disp Refills    metoprolol succinate ER (TOPROL-XL) 100 MG 24 hr tablet 90 tablet 0     Sig: Take 1 tablet (100 mg) by mouth daily       Beta-Blockers Protocol Passed - 2/10/2020  7:02 AM        Passed - Blood pressure under 140/90 in past 12 months     BP Readings from Last 3 Encounters:   08/15/19 118/68   04/18/19 144/54   04/15/19 180/85                 Passed - Patient is age 6 or older        Passed - Recent (12 mo) or future (30 days) visit within the authorizing provider's specialty     Patient has had an office visit with the authorizing provider or a provider within the authorizing providers department within the previous 12 mos or has a future within next 30 days. See \"Patient Info\" tab in inbasket, or \"Choose Columns\" in Meds & Orders section of the refill encounter.              Passed - Medication is active on med list          "

## 2020-02-13 ENCOUNTER — OFFICE VISIT (OUTPATIENT)
Dept: FAMILY MEDICINE | Facility: CLINIC | Age: 65
End: 2020-02-13
Payer: COMMERCIAL

## 2020-02-13 ENCOUNTER — TELEPHONE (OUTPATIENT)
Dept: FAMILY MEDICINE | Facility: CLINIC | Age: 65
End: 2020-02-13

## 2020-02-13 VITALS
SYSTOLIC BLOOD PRESSURE: 161 MMHG | HEIGHT: 65 IN | WEIGHT: 229 LBS | HEART RATE: 68 BPM | DIASTOLIC BLOOD PRESSURE: 85 MMHG | BODY MASS INDEX: 38.15 KG/M2 | OXYGEN SATURATION: 96 % | TEMPERATURE: 98.6 F

## 2020-02-13 DIAGNOSIS — E03.8 OTHER SPECIFIED HYPOTHYROIDISM: ICD-10-CM

## 2020-02-13 DIAGNOSIS — E78.5 HYPERLIPIDEMIA LDL GOAL <130: ICD-10-CM

## 2020-02-13 DIAGNOSIS — I10 ESSENTIAL HYPERTENSION WITH GOAL BLOOD PRESSURE LESS THAN 140/90: ICD-10-CM

## 2020-02-13 DIAGNOSIS — Z00.00 ROUTINE GENERAL MEDICAL EXAMINATION AT A HEALTH CARE FACILITY: Primary | ICD-10-CM

## 2020-02-13 DIAGNOSIS — B00.1 RECURRENT COLD SORES: ICD-10-CM

## 2020-02-13 DIAGNOSIS — Z12.31 VISIT FOR SCREENING MAMMOGRAM: ICD-10-CM

## 2020-02-13 PROCEDURE — 99213 OFFICE O/P EST LOW 20 MIN: CPT | Mod: 25 | Performed by: PHYSICIAN ASSISTANT

## 2020-02-13 PROCEDURE — 99397 PER PM REEVAL EST PAT 65+ YR: CPT | Performed by: PHYSICIAN ASSISTANT

## 2020-02-13 RX ORDER — LEVOTHYROXINE SODIUM 125 UG/1
125 TABLET ORAL DAILY
Qty: 90 TABLET | Refills: 3 | Status: SHIPPED | OUTPATIENT
Start: 2020-02-13 | End: 2021-02-09

## 2020-02-13 RX ORDER — LOSARTAN POTASSIUM AND HYDROCHLOROTHIAZIDE 12.5; 5 MG/1; MG/1
2 TABLET ORAL DAILY
Qty: 180 TABLET | Refills: 1 | Status: CANCELLED | OUTPATIENT
Start: 2020-02-13

## 2020-02-13 RX ORDER — AMLODIPINE BESYLATE 5 MG/1
5 TABLET ORAL DAILY
Qty: 90 TABLET | Refills: 1 | Status: SHIPPED | OUTPATIENT
Start: 2020-02-13 | End: 2020-04-22

## 2020-02-13 RX ORDER — LOSARTAN POTASSIUM AND HYDROCHLOROTHIAZIDE 25; 100 MG/1; MG/1
1 TABLET ORAL DAILY
Qty: 90 TABLET | Refills: 1 | Status: SHIPPED | OUTPATIENT
Start: 2020-02-13 | End: 2020-02-13

## 2020-02-13 RX ORDER — AMLODIPINE BESYLATE 2.5 MG/1
5 TABLET ORAL DAILY
Qty: 180 TABLET | Refills: 1 | Status: CANCELLED | OUTPATIENT
Start: 2020-02-13

## 2020-02-13 RX ORDER — SIMVASTATIN 40 MG
40 TABLET ORAL AT BEDTIME
Qty: 90 TABLET | Refills: 1 | Status: SHIPPED | OUTPATIENT
Start: 2020-02-13 | End: 2020-08-11

## 2020-02-13 RX ORDER — VALACYCLOVIR HYDROCHLORIDE 1 G/1
TABLET, FILM COATED ORAL
Qty: 30 TABLET | Refills: 3 | Status: SHIPPED | OUTPATIENT
Start: 2020-02-13 | End: 2022-01-05

## 2020-02-13 RX ORDER — HYDROCHLOROTHIAZIDE 25 MG/1
25 TABLET ORAL DAILY
Qty: 90 TABLET | Refills: 1 | Status: SHIPPED | OUTPATIENT
Start: 2020-02-13 | End: 2020-08-11

## 2020-02-13 RX ORDER — METOPROLOL SUCCINATE 100 MG/1
100 TABLET, EXTENDED RELEASE ORAL DAILY
Qty: 90 TABLET | Refills: 1 | Status: SHIPPED | OUTPATIENT
Start: 2020-02-13 | End: 2020-08-11

## 2020-02-13 RX ORDER — LOSARTAN POTASSIUM 100 MG/1
100 TABLET ORAL DAILY
Qty: 90 TABLET | Refills: 1 | Status: SHIPPED | OUTPATIENT
Start: 2020-02-13 | End: 2020-08-11

## 2020-02-13 ASSESSMENT — ENCOUNTER SYMPTOMS
HEADACHES: 1
BREAST MASS: 0
MYALGIAS: 1
ARTHRALGIAS: 1

## 2020-02-13 ASSESSMENT — ACTIVITIES OF DAILY LIVING (ADL): CURRENT_FUNCTION: NO ASSISTANCE NEEDED

## 2020-02-13 ASSESSMENT — PAIN SCALES - GENERAL: PAINLEVEL: NO PAIN (0)

## 2020-02-13 ASSESSMENT — MIFFLIN-ST. JEOR: SCORE: 1579.62

## 2020-02-13 NOTE — PROGRESS NOTES
"   SUBJECTIVE:   CC: Migdalia Escalera is an 64 year old woman who presents for preventive health visit.     Healthy Habits:     In general, how would you rate your overall health?  Good    Frequency of exercise:  2-3 days/week    Duration of exercise:  30-45 minutes    Do you usually eat at least 4 servings of fruit and vegetables a day, include whole grains    & fiber and avoid regularly eating high fat or \"junk\" foods?  No    Taking medications regularly:  Yes    Medication side effects:  None    Ability to successfully perform activities of daily living:  No assistance needed    Home Safety:  No safety concerns identified    Hearing Impairment:  Difficulty understanding soft or whispered speech    In the past 6 months, have you been bothered by leaking of urine? Yes    In general, how would you rate your overall mental or emotional health?  Good      PHQ-2 Total Score: 0    Additional concerns today:  No        Today's PHQ-2 Score:   PHQ-2 (  [employer]) 2020   Q1: Little interest or pleasure in doing things 0   Q2: Feeling down, depressed or hopeless 0   PHQ-2 Score 0   Q1: Little interest or pleasure in doing things Not at all   Q2: Feeling down, depressed or hopeless Not at all   PHQ-2 Score 0       Abuse: Current or Past(Physical, Sexual or Emotional)- No  Do you feel safe in your environment? Yes    PROBLEMS TO ADD....  1. Hypertension: she will need refills of her medication. There was a back order at the pharmacy and dosage has not been the same. She has only been taking 1 of the losartan / hydrochlorothiazide combination. She was supposed to be taking 2 pills daily to get to the accurate dose.   2. Hyperlipidemia: She continues on the simvastatin daily  3. Hypothyroid: she is currently on the 125 mcg of levothyroxine.     Social History     Tobacco Use     Smoking status: Former Smoker     Types: Cigarettes     Last attempt to quit: 2005     Years since quittin.2     Smokeless tobacco: Never " Used     Tobacco comment: no 2nd hand smoke exposure   Substance Use Topics     Alcohol use: Yes     Comment: occ.     If you drink alcohol do you typically have >3 drinks per day or >7 drinks per week? No    Alcohol Use 2/13/2020   Prescreen: >3 drinks/day or >7 drinks/week? Yes   AUDIT SCORE  5     AUDIT - Alcohol Use Disorders Identification Test - Reproduced from the World Health Organization Audit 2001 (Second Edition) 2/13/2020   1.  How often do you have a drink containing alcohol? 2 to 3 times a week   2.  How many drinks containing alcohol do you have on a typical day when you are drinking? 3 or 4   3.  How often do you have five or more drinks on one occasion? Less than monthly   4.  How often during the last year have you found that you were not able to stop drinking once you had started? Never   5.  How often during the last year have you failed to do what was normally expected of you because of drinking? Never   6.  How often during the last year have you needed a first drink in the morning to get yourself going after a heavy drinking session? Never   7.  How often during the last year have you had a feeling of guilt or remorse after drinking? Never   8.  How often during the last year have you been unable to remember what happened the night before because of your drinking? Never   9.  Have you or someone else been injured because of your drinking? No   10. Has a relative, friend, doctor or other health care worker been concerned about your drinking or suggested you cut down? No   TOTAL SCORE 5       Reviewed orders with patient.  Reviewed health maintenance and updated orders accordingly - Yes  BP Readings from Last 3 Encounters:   02/13/20 (!) 161/85   08/15/19 118/68   04/18/19 144/54    Wt Readings from Last 3 Encounters:   02/13/20 103.9 kg (229 lb)   08/15/19 103.9 kg (229 lb)   04/18/19 108 kg (238 lb)                  Patient Active Problem List   Diagnosis     Hyperlipidemia LDL goal <130      Advanced directives, counseling/discussion     H/O: hysterectomy     Vitamin D deficiency     Hypothyroidism, unspecified hypothyroidism type     History of colonic polyps     Essential hypertension with goal blood pressure less than 140/90     Breast hypertrophy     Obesity (BMI 35.0-39.9) with comorbidity (H)     Asymptomatic postmenopausal status     Hypothyroid     Past Surgical History:   Procedure Laterality Date     HYSTERECTOMY, PAP NO LONGER INDICATED       SURGICAL HISTORY OF -       thoracic spine       Social History     Tobacco Use     Smoking status: Former Smoker     Types: Cigarettes     Last attempt to quit: 2005     Years since quittin.2     Smokeless tobacco: Never Used     Tobacco comment: no 2nd hand smoke exposure   Substance Use Topics     Alcohol use: Yes     Comment: occ.     Family History   Problem Relation Age of Onset     Arthritis Mother      Prostate Cancer Father      Hypertension Father      Cancer Father         bone     Diabetes Maternal Grandfather      Thyroid Disease Sister          Current Outpatient Medications   Medication Sig Dispense Refill     albuterol (PROAIR HFA, PROVENTIL HFA, VENTOLIN HFA) 108 (90 BASE) MCG/ACT inhaler Inhale 2 puffs into the lungs every 4 hours as needed for shortness of breath / dyspnea or wheezing Use with spacer 1 Inhaler 0     amLODIPine (NORVASC) 5 MG tablet Take 1 tablet (5 mg) by mouth daily 90 tablet 1     aspirin 81 MG tablet Take 1 tablet by mouth daily.       CALCIUM PO        levothyroxine (SYNTHROID/LEVOTHROID) 125 MCG tablet Take 1 tablet (125 mcg) by mouth daily 90 tablet 3     metoprolol succinate ER (TOPROL-XL) 100 MG 24 hr tablet Take 1 tablet (100 mg) by mouth daily 90 tablet 1     order for DME Equipment being ordered: compression stockings / mild compression 1 Units 3     simvastatin (ZOCOR) 40 MG tablet Take 1 tablet (40 mg) by mouth At Bedtime 90 tablet 1     valACYclovir (VALTREX) 1000 mg tablet Take 2 tablets (2,000  mg) by mouth 2 times daily 30 tablet 3     furosemide (LASIX) 20 MG tablet Take 1 tablet (20 mg) by mouth daily (Patient not taking: Reported on 8/15/2019) 30 tablet 1     hydrochlorothiazide (HYDRODIURIL) 25 MG tablet Take 1 tablet (25 mg) by mouth daily 90 tablet 1     losartan (COZAAR) 100 MG tablet Take 1 tablet (100 mg) by mouth daily 90 tablet 1     Allergies   Allergen Reactions     Amoxicillin      Erythromycin      Recent Labs   Lab Test 02/06/20  0806 08/08/19  0806 07/01/19  0806  01/29/19  0845 03/29/18  0830  10/20/16  0943   LDL 79  --   --   --  101* 109*   < > 121*   HDL 77  --   --   --  75 75   < > 75   TRIG 116  --   --   --  131 93   < > 112   ALT  --  28  --   --   --  28  --  26   CR 0.82 0.95  --   --  1.01 0.76   < > 0.86   GFRESTIMATED 74 62  --   --  58* 76   < > 66   GFRESTBLACK 86 72  --   --  67 >90   < > 80   POTASSIUM 4.1 4.3  --   --  4.1 4.1   < > 4.0   TSH 0.44  --  4.30*   < >  --  2.53   < >  --     < > = values in this interval not displayed.        Mammogram Screening: Patient over age 50, mutual decision to screen reflected in health maintenance.    Pertinent mammograms are reviewed under the imaging tab.  History of abnormal Pap smear: NO - age 65 - see link Cervical Cytology Screening Guidelines     Reviewed and updated as needed this visit by clinical staff  Tobacco  Allergies  Meds  Med Hx  Surg Hx  Fam Hx  Soc Hx        Reviewed and updated as needed this visit by Provider        Past Medical History:   Diagnosis Date     High cholesterol      HTN      Hypothyroid      Pulmonary actinomycosis (H)      Seasonal allergies       Past Surgical History:   Procedure Laterality Date     HYSTERECTOMY, PAP NO LONGER INDICATED       SURGICAL HISTORY OF -       thoracic spine       Review of Systems   Breasts:  Negative for tenderness, breast mass and discharge.   Genitourinary: Negative for pelvic pain, vaginal bleeding and vaginal discharge.   Musculoskeletal: Positive for  "arthralgias and myalgias.   Neurological: Positive for headaches.     CONSTITUTIONAL: NEGATIVE for fever, chills, change in weight  INTEGUMENTARY/SKIN: NEGATIVE for worrisome rashes, moles or lesions  EYES: NEGATIVE for vision changes or irritation  ENT: NEGATIVE for ear, mouth and throat problems  RESP: NEGATIVE for significant cough or SOB  BREAST: NEGATIVE for masses, tenderness or discharge  CV: NEGATIVE for chest pain, palpitations or peripheral edema  GI: NEGATIVE for nausea, abdominal pain, heartburn, or change in bowel habits  : NEGATIVE for unusual urinary or vaginal symptoms. No vaginal bleeding.  MUSCULOSKELETAL: as above. Arthritis pain.   NEURO: NEGATIVE for weakness, dizziness or paresthesias. She will have headaches off and on.  PSYCHIATRIC: NEGATIVE for changes in mood or affect      OBJECTIVE:   BP (!) 161/85   Pulse 68   Temp 98.6  F (37  C) (Oral)   Ht 1.651 m (5' 5\")   Wt 103.9 kg (229 lb)   SpO2 96%   BMI 38.11 kg/m    Physical Exam  GENERAL APPEARANCE: healthy, alert and no distress  EYES: Eyes grossly normal to inspection, PERRL and conjunctivae and sclerae normal  HENT: ear canals and TM's normal, nose and mouth without ulcers or lesions, oropharynx clear and oral mucous membranes moist  NECK: no adenopathy, no asymmetry, masses, or scars and thyroid normal to palpation  RESP: lungs clear to auscultation - no rales, rhonchi or wheezes  BREAST: patient declines  CV: regular rate and rhythm, normal S1 S2, no S3 or S4, no murmur, click or rub, no peripheral edema and peripheral pulses strong  ABDOMEN: soft, nontender, no hepatosplenomegaly, no masses and bowel sounds normal  : patient declines.   MS: no musculoskeletal defects are noted and gait is age appropriate without ataxia  SKIN: no suspicious lesions or rashes  NEURO: Normal strength and tone, sensory exam grossly normal, mentation intact and speech normal  PSYCH: mentation appears normal and affect normal/bright    Diagnostic " "Test Results:  Reviewed in Epic.     ASSESSMENT/PLAN:   1. Routine general medical examination at a health care facility  Health maintenance reviewed and updated.      2. Essential hypertension with goal blood pressure less than 140/90  She has not been taking the correct dose of medication due to the change at the pharmacy. Reviewed medications and she will get back on track with the losartan / hydrochlorothiazide dose of 100/ 25 mg. The metoprolol is at 100 mg daily and the amlodipine is at 5 mg daily. Plan for recheck with nursing staff in 1 month.   - metoprolol succinate ER (TOPROL-XL) 100 MG 24 hr tablet; Take 1 tablet (100 mg) by mouth daily  Dispense: 90 tablet; Refill: 1  - amLODIPine (NORVASC) 5 MG tablet; Take 1 tablet (5 mg) by mouth daily  Dispense: 90 tablet; Refill: 1    3. Hyperlipidemia LDL goal <130  Stable, refills given  - simvastatin (ZOCOR) 40 MG tablet; Take 1 tablet (40 mg) by mouth At Bedtime  Dispense: 90 tablet; Refill: 1    4. Other specified hypothyroidism  Stable, refills given   - levothyroxine (SYNTHROID/LEVOTHROID) 125 MCG tablet; Take 1 tablet (125 mcg) by mouth daily  Dispense: 90 tablet; Refill: 3    5. Visit for screening mammogram  - MA SCREENING DIGITAL BILAT - Future  (s+30); Future    6. Recurrent cold sores  Stable, refills given  - valACYclovir (VALTREX) 1000 mg tablet; Take 2 tablets (2,000 mg) by mouth 2 times daily  Dispense: 30 tablet; Refill: 3    COUNSELING:  Reviewed preventive health counseling, as reflected in patient instructions       Regular exercise       Healthy diet/nutrition    Estimated body mass index is 38.11 kg/m  as calculated from the following:    Height as of this encounter: 1.651 m (5' 5\").    Weight as of this encounter: 103.9 kg (229 lb).    Weight management plan: Discussed healthy diet and exercise guidelines     reports that she quit smoking about 14 years ago. Her smoking use included cigarettes. She has never used smokeless " tobacco.      Counseling Resources:  ATP IV Guidelines  Pooled Cohorts Equation Calculator  Breast Cancer Risk Calculator  FRAX Risk Assessment  ICSI Preventive Guidelines  Dietary Guidelines for Americans, 2010  USDA's MyPlate  ASA Prophylaxis  Lung CA Screening    Kristen M. Kehr, PA-C  [facility]

## 2020-02-13 NOTE — TELEPHONE ENCOUNTER
Routed to provider to review and sign pending orders if appropriate to replace the unavailable combination product.    The following order is on backorder and unavailable at this time. Medication was d/c'd from med list and separate ingredient orders pended for provider review:  losartan-hydrochlorothiazide (HYZAAR) 100-25 MG tablet 90 tablet 1 2/13/2020  No   Sig - Route: Take 1 tablet by mouth daily - Oral     E-Prescribing Status: Receipt confirmed by pharmacy (2/13/2020  9:14 AM CST)     XOCHITL CollinsN, RN

## 2020-02-13 NOTE — PATIENT INSTRUCTIONS
Blood pressure recheck with nursing staff in 1 month / Chandan will schedule.     Schedule mammogram        Preventive Health Recommendations    See your health care provider every year to    Review health changes.     Discuss preventive care.      Review your medicines if your doctor has prescribed any.      You no longer need a yearly Pap test unless you've had an abnormal Pap test in the past 10 years. If you have vaginal symptoms, such as bleeding or discharge, be sure to talk with your provider about a Pap test.      Every 1 to 2 years, have a mammogram.  If you are over 69, talk with your health care provider about whether or not you want to continue having screening mammograms.      Every 10 years, have a colonoscopy. Or, have a yearly FIT test (stool test). These exams will check for colon cancer.       Have a cholesterol test every 5 years, or more often if your doctor advises it.       Have a diabetes test (fasting glucose) every three years. If you are at risk for diabetes, you should have this test more often.       At age 65, have a bone density scan (DEXA) to check for osteoporosis (brittle bone disease).    Shots:    Get a flu shot each year.    Get a tetanus shot every 10 years.    Talk to your doctor about your pneumonia vaccines. There are now two you should receive - Pneumovax (PPSV 23) and Prevnar (PCV 13).    Talk to your pharmacist about the shingles vaccine.    Talk to your doctor about the hepatitis B vaccine.    Nutrition:     Eat at least 5 servings of fruits and vegetables each day.      Eat whole-grain bread, whole-wheat pasta and brown rice instead of white grains and rice.      Get adequate about Calcium and Vitamin D.     Lifestyle    Exercise at least 150 minutes a week (30 minutes a day, 5 days a week). This will help you control your weight and prevent disease.      Limit alcohol to one drink per day.      No smoking.       Wear sunscreen to prevent skin cancer.       See your dentist  twice a year for an exam and cleaning.      See your eye doctor every 1 to 2 years to screen for conditions such as glaucoma, macular degeneration, cataracts, etc.    Personalized Prevention Plan  You are due for the preventive services outlined below.  Your care team is available to assist you in scheduling these services.  If you have already completed any of these items, please share that information with your care team to update in your medical record.    Health Maintenance Due   Topic Date Due     Zoster (Shingles) Vaccine (1 of 2) 04/12/2003     Annual Wellness Visit  04/12/2018     Pneumococcal Vaccine (1 of 2 - PCV13) 04/12/2018     Flu Vaccine (1) 09/01/2019     PHQ-2  01/01/2020     Mammogram  01/04/2020

## 2020-02-13 NOTE — TELEPHONE ENCOUNTER
Left message on answering machine for patient to call back.  [phone]  Dania LEUNGN, RN, CPN

## 2020-02-13 NOTE — TELEPHONE ENCOUNTER
I am fine with the medications , but please make sure the patient is aware that she will be taking 1 of both of these pills.     When this was a problem last time, the patient did not end up taking the correct dose and blood pressure has not been well controlled for a few months.     She just needs to know very specifically because all of her medication doses were reviewed and we tried to adjust to take only 1 tablet daily to eliminate the confusion.     Take 1 of each of these medications. Follow up for the blood pressure reading as scheduled in 1 month.     Thank you.     Kristen Kehr PA-C

## 2020-02-13 NOTE — TELEPHONE ENCOUNTER
Reason for Call:  Other prescription    Detailed comments: Pharmacy is calling stating RX: losartan-hydrochlorothiazide (HYZAAR) 100-25 MG tablet has been on back order, would like provider to send separate RX for each individually. Please call to discuss. Thank you.    Phone Number Patient can be reached at: {[phone]    Best Time:     Can we leave a detailed message on this number? YES    Call taken on 2/13/2020 at 9:58 AM by Monika Randall

## 2020-02-13 NOTE — NURSING NOTE
"Chief Complaint   Patient presents with     Physical       Initial BP (!) 161/85   Pulse 68   Temp 98.6  F (37  C) (Oral)   Ht 1.651 m (5' 5\")   Wt 103.9 kg (229 lb)   SpO2 96%   BMI 38.11 kg/m   Estimated body mass index is 38.11 kg/m  as calculated from the following:    Height as of this encounter: 1.651 m (5' 5\").    Weight as of this encounter: 103.9 kg (229 lb).  Medication Reconciliation: complete    HORTENCIA Garcia MA    "

## 2020-02-14 NOTE — TELEPHONE ENCOUNTER
Patient/parent is informed of MD note below, as it is written. Verbalized good understanding.  Patient reports she does use a pill box.    Patient has upcoming nurse only appointment 3/19/20.  Patient/parent verbalized understanding of instructions provided and agreed with the plan of care  Alexia Aguirre RN

## 2020-02-14 NOTE — TELEPHONE ENCOUNTER
Voicemail message received from pt returning call to RN. In voicemail message left by pt, she requests to be called back at [phone] today.  XOCHITL CollinsN, RN

## 2020-02-14 NOTE — TELEPHONE ENCOUNTER
Left message on answering machine for patient/parent to call back.   [phone].  Alexia Aguirre RN

## 2020-02-29 ENCOUNTER — ANCILLARY PROCEDURE (OUTPATIENT)
Dept: MAMMOGRAPHY | Facility: CLINIC | Age: 65
End: 2020-02-29
Attending: PHYSICIAN ASSISTANT
Payer: COMMERCIAL

## 2020-02-29 DIAGNOSIS — Z12.31 VISIT FOR SCREENING MAMMOGRAM: ICD-10-CM

## 2020-02-29 PROCEDURE — 77067 SCR MAMMO BI INCL CAD: CPT | Mod: TC

## 2020-02-29 PROCEDURE — 77063 BREAST TOMOSYNTHESIS BI: CPT | Mod: TC

## 2020-03-19 ENCOUNTER — ALLIED HEALTH/NURSE VISIT (OUTPATIENT)
Dept: NURSING | Facility: CLINIC | Age: 65
End: 2020-03-19
Payer: COMMERCIAL

## 2020-03-19 VITALS — DIASTOLIC BLOOD PRESSURE: 76 MMHG | SYSTOLIC BLOOD PRESSURE: 130 MMHG

## 2020-03-19 DIAGNOSIS — I10 ESSENTIAL HYPERTENSION WITH GOAL BLOOD PRESSURE LESS THAN 140/90: Primary | ICD-10-CM

## 2020-03-19 PROCEDURE — 99207 ZZC NO CHARGE LOS: CPT

## 2020-03-19 NOTE — NURSING NOTE
SUBJECTIVE:  Migdalia Escalera is an 64 year old female who presents for a blood pressure check.    The reason for the visit is:  a recent medication change    The patient reports that she IS taking the medication as prescribed.     Current Outpatient Medications   Medication     amLODIPine (NORVASC) 5 MG tablet     aspirin 81 MG tablet     hydrochlorothiazide (HYDRODIURIL) 25 MG tablet     levothyroxine (SYNTHROID/LEVOTHROID) 125 MCG tablet     losartan (COZAAR) 100 MG tablet     metoprolol succinate ER (TOPROL-XL) 100 MG 24 hr tablet     simvastatin (ZOCOR) 40 MG tablet     valACYclovir (VALTREX) 1000 mg tablet     albuterol (PROAIR HFA, PROVENTIL HFA, VENTOLIN HFA) 108 (90 BASE) MCG/ACT inhaler     CALCIUM PO     furosemide (LASIX) 20 MG tablet     order for DME     No current facility-administered medications for this visit.        Allergies   Allergen Reactions     Amoxicillin      Erythromycin          OBJECTIVE:  Please get a blood pressure AND a pulse.  A height is also needed if has not been done in the past year.    BP (!) 146/84     Blood pressure upon recheck was: 130/76     If patient has diagnosis of HYPERTENSION, is there a goal on the problem list? Yes  Patient Active Problem List   Diagnosis     Hyperlipidemia LDL goal <130     Advanced directives, counseling/discussion     H/O: hysterectomy     Vitamin D deficiency     Hypothyroidism, unspecified hypothyroidism type     History of colonic polyps     Essential hypertension with goal blood pressure less than 140/90     Breast hypertrophy     Obesity (BMI 35.0-39.9) with comorbidity (H)     Asymptomatic postmenopausal status     Hypothyroid       Plan:    Systolic BP    Greater than or equal to 100  OR Less than  140    Diastolic BP    Greater than or equal to 70 OR less than 90    Pulse    Pulse greater than 60 or less than 100      If all the above three parameters are met, patient to go home. Chart CC to Primary Care Provider  If any one of the above  three parameters is not met notify RN or provider.

## 2020-04-02 DIAGNOSIS — R06.2 WHEEZING: ICD-10-CM

## 2020-04-02 DIAGNOSIS — R06.02 SOB (SHORTNESS OF BREATH): ICD-10-CM

## 2020-04-03 RX ORDER — ALBUTEROL SULFATE 90 UG/1
AEROSOL, METERED RESPIRATORY (INHALATION)
Qty: 18 G | Refills: 0 | Status: SHIPPED | OUTPATIENT
Start: 2020-04-03 | End: 2023-01-05

## 2020-04-06 ENCOUNTER — VIRTUAL VISIT (OUTPATIENT)
Dept: FAMILY MEDICINE | Facility: CLINIC | Age: 65
End: 2020-04-06
Payer: COMMERCIAL

## 2020-04-06 DIAGNOSIS — M79.18 MUSCULOSKELETAL PAIN: Primary | ICD-10-CM

## 2020-04-06 PROCEDURE — 99213 OFFICE O/P EST LOW 20 MIN: CPT | Mod: TEL | Performed by: FAMILY MEDICINE

## 2020-04-06 RX ORDER — MELOXICAM 15 MG/1
15 TABLET ORAL DAILY
Qty: 10 TABLET | Refills: 0 | Status: SHIPPED | OUTPATIENT
Start: 2020-04-06 | End: 2020-07-09

## 2020-04-06 RX ORDER — CYCLOBENZAPRINE HCL 10 MG
10 TABLET ORAL 3 TIMES DAILY PRN
Qty: 30 TABLET | Refills: 0 | Status: SHIPPED | OUTPATIENT
Start: 2020-04-06 | End: 2020-07-09

## 2020-04-06 NOTE — PROGRESS NOTES
"Subjective     Migdalia Escalera is a 64 year old female who is being evaluated via a billable telephone visit.      The patient has been notified of following:     \"This telephone visit will be conducted via a call between you and your physician/provider. We have found that certain health care needs can be provided without the need for a physical exam.  This service lets us provide the care you need with a short phone conversation.  If a prescription is necessary we can send it directly to your pharmacy.  If lab work is needed we can place an order for that and you can then stop by our lab to have the test done at a later time.    If during the course of the call the physician/provider feels a telephone visit is not appropriate, you will not be charged for this service.\"     Patient has given verbal consent for Telephone visit?  Yes    Migdalia Escalera complains of   Chief Complaint   Patient presents with     Breathing Problem       ALLERGIES  Amoxicillin and Erythromycin    Pain in Back/side      Duration: started saturday    Description (location/character/radiation): right side of back and side    Intensity:  moderate    Accompanying signs and symptoms: worse when taking deep breaths, no fever, pain mainly when moving or taking a deep breath    History (similar episodes/previous evaluation): no trauma that patient knows of    Precipitating or alleviating factors:     Therapies tried and outcome: tylenol helps              Patient Active Problem List   Diagnosis     Hyperlipidemia LDL goal <130     Advanced directives, counseling/discussion     H/O: hysterectomy     Vitamin D deficiency     Hypothyroidism, unspecified hypothyroidism type     History of colonic polyps     Essential hypertension with goal blood pressure less than 140/90     Breast hypertrophy     Obesity (BMI 35.0-39.9) with comorbidity (H)     Asymptomatic postmenopausal status     Hypothyroid     Past Surgical History:   Procedure Laterality Date "     HYSTERECTOMY, PAP NO LONGER INDICATED       SURGICAL HISTORY OF -       thoracic spine       Social History     Tobacco Use     Smoking status: Former Smoker     Types: Cigarettes     Last attempt to quit: 2005     Years since quittin.3     Smokeless tobacco: Never Used     Tobacco comment: no 2nd hand smoke exposure   Substance Use Topics     Alcohol use: Yes     Comment: occ.     Family History   Problem Relation Age of Onset     Arthritis Mother      Prostate Cancer Father      Hypertension Father      Cancer Father         bone     Diabetes Maternal Grandfather      Thyroid Disease Sister          Current Outpatient Medications   Medication Sig Dispense Refill     amLODIPine (NORVASC) 5 MG tablet Take 1 tablet (5 mg) by mouth daily (Patient taking differently: Take 5 mg by mouth daily 2 tablets) 90 tablet 1     aspirin 81 MG tablet Take 1 tablet by mouth daily.       cyclobenzaprine (FLEXERIL) 10 MG tablet Take 1 tablet (10 mg) by mouth 3 times daily as needed for muscle spasms 30 tablet 0     hydrochlorothiazide (HYDRODIURIL) 25 MG tablet Take 1 tablet (25 mg) by mouth daily 90 tablet 1     levothyroxine (SYNTHROID/LEVOTHROID) 125 MCG tablet Take 1 tablet (125 mcg) by mouth daily 90 tablet 3     losartan (COZAAR) 100 MG tablet Take 1 tablet (100 mg) by mouth daily 90 tablet 1     meloxicam (MOBIC) 15 MG tablet Take 1 tablet (15 mg) by mouth daily 10 tablet 0     metoprolol succinate ER (TOPROL-XL) 100 MG 24 hr tablet Take 1 tablet (100 mg) by mouth daily 90 tablet 1     order for DME Equipment being ordered: compression stockings / mild compression 1 Units 3     simvastatin (ZOCOR) 40 MG tablet Take 1 tablet (40 mg) by mouth At Bedtime 90 tablet 1     VENTOLIN  (90 Base) MCG/ACT inhaler INHALE 2 PUFFS BY INHALATION ROUTE EVERY 4 HOURS AS NEEDED FOR SHORTNESS OF BREATH / DYSPNEA OR WHEEZING. USE WITH SPACER. 18 g 0     CALCIUM PO        furosemide (LASIX) 20 MG tablet Take 1 tablet (20 mg)  by mouth daily (Patient not taking: Reported on 8/15/2019) 30 tablet 1     valACYclovir (VALTREX) 1000 mg tablet Take 2 tablets (2,000 mg) by mouth 2 times daily (Patient not taking: Reported on 4/6/2020) 30 tablet 3     Allergies   Allergen Reactions     Amoxicillin      Erythromycin      Recent Labs   Lab Test 02/06/20  0806 08/08/19  0806 07/01/19  0806  01/29/19  0845 03/29/18  0830  10/20/16  0943   LDL 79  --   --   --  101* 109*   < > 121*   HDL 77  --   --   --  75 75   < > 75   TRIG 116  --   --   --  131 93   < > 112   ALT  --  28  --   --   --  28  --  26   CR 0.82 0.95  --   --  1.01 0.76   < > 0.86   GFRESTIMATED 74 62  --   --  58* 76   < > 66   GFRESTBLACK 86 72  --   --  67 >90   < > 80   POTASSIUM 4.1 4.3  --   --  4.1 4.1   < > 4.0   TSH 0.44  --  4.30*   < >  --  2.53   < >  --     < > = values in this interval not displayed.      BP Readings from Last 3 Encounters:   03/19/20 130/76   02/13/20 (!) 161/85   08/15/19 118/68    Wt Readings from Last 3 Encounters:   02/13/20 103.9 kg (229 lb)   08/15/19 103.9 kg (229 lb)   04/18/19 108 kg (238 lb)                    Reviewed and updated as needed this visit by Provider  Tobacco  Allergies  Meds  Problems  Med Hx  Surg Hx  Fam Hx         Review of Systems   ROS COMP: Constitutional, HEENT, cardiovascular, pulmonary, gi and gu systems are negative, except as otherwise noted.       Objective   Reported vitals:  There were no vitals taken for this visit.           Assessment/Plan:  1. Musculoskeletal pain    Patient is concerned about side pain and back pain mainly on the right side.  worse when taking deep breaths, no fever, pain mainly when moving or taking a deep breath  No red flags.   Patient took tylenol and helped   Pain is likely musculoskeletal in origin. Prescribed Mobic 15 mg and Flexeril advised to use warm compress as needed.  Reviewed red flags and warning signs that would mandate go to the emergency room, advised patient to go to  the ER if she develops any of these ssymptoms.      - meloxicam (MOBIC) 15 MG tablet; Take 1 tablet (15 mg) by mouth daily  Dispense: 10 tablet; Refill: 0  - cyclobenzaprine (FLEXERIL) 10 MG tablet; Take 1 tablet (10 mg) by mouth 3 times daily as needed for muscle spasms  Dispense: 30 tablet; Refill: 0    No follow-ups on file.    Patient verbalized understanding and agreed on the plan of care.  All questions answered.  Phone call duration:   minutes    Linda Puente MD

## 2020-04-21 DIAGNOSIS — I10 ESSENTIAL HYPERTENSION WITH GOAL BLOOD PRESSURE LESS THAN 140/90: ICD-10-CM

## 2020-04-21 NOTE — TELEPHONE ENCOUNTER
Routing refill request to provider for review/approval because:  Dose being requested is different than what is listed on medication list.     Routing to provider to advise.  Alyssa Coleman BSN, RN

## 2020-04-22 RX ORDER — AMLODIPINE BESYLATE 5 MG/1
5 TABLET ORAL DAILY
Qty: 90 TABLET | Refills: 1 | COMMUNITY
Start: 2020-04-22 | End: 2020-08-18

## 2020-04-22 RX ORDER — AMLODIPINE BESYLATE 2.5 MG/1
5 TABLET ORAL DAILY
Qty: 180 TABLET | Refills: 0 | OUTPATIENT
Start: 2020-04-22

## 2020-04-22 NOTE — TELEPHONE ENCOUNTER
Please call pt and verify dose of medication. Her chart and last notes say she is on norvasc 5mg. She is requesting 2.5 mg    Radha Wong MD

## 2020-04-22 NOTE — TELEPHONE ENCOUNTER
Call to patient, she reports she is currently using up her Amlodipine 2.5 mg 2 tabs daily for a total of 5 mg per day.  Denial sent to pharmacy, still has refills from 2/13/20  Med list is updated.  Alexia Aguirre RN

## 2020-07-08 NOTE — PROGRESS NOTES
Subjective     Migdalia Escalera is a 65 year old female who presents to clinic today for the following health issues:    HPI   Musculoskeletal problem/pain      Duration: 1 month, twisted     Description  Location: right foot    Intensity:  moderate    Accompanying signs and symptoms: swelling and tender    History  Previous similar problem: no   Previous evaluation:  none    Precipitating or alleviating factors:  Trauma or overuse: YES  Aggravating factors include: walking    Therapies tried and outcome: ice      Patient reports that approximately 1 months ago she stepped in hole with her right foot.  She instantly had pain and swelling in her foot.  Pain mostly along the lateral aspect.  She did have bruising to the top of her foot and toes.  She did not seek medical care.  She did improve, but continues to have some intermittent swelling of the foot and pain to the mid-lateral aspect.  She has applied ice and take some Tylenol, both moderately helpful.     Patient reports history of bilateral lower extremity edema that is worse in the summer.  She takes lasix 20 mg once daily prn for this.  She is asking for a refill today.  She denies any hx of CHF.  She denies any calf pain, redness or warmth.  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  She has never tried compression stockings.  She states her PCP has prescribed them, but she has not filled rx.         Patient Active Problem List   Diagnosis     Hyperlipidemia LDL goal <130     Advanced directives, counseling/discussion     H/O: hysterectomy     Vitamin D deficiency     Hypothyroidism, unspecified hypothyroidism type     History of colonic polyps     Essential hypertension with goal blood pressure less than 140/90     Breast hypertrophy     Obesity (BMI 35.0-39.9) with comorbidity (H)     Asymptomatic postmenopausal status     Hypothyroid     Past Surgical History:   Procedure Laterality Date     HYSTERECTOMY, PAP NO LONGER  INDICATED       SURGICAL HISTORY OF -       thoracic spine       Social History     Tobacco Use     Smoking status: Former Smoker     Types: Cigarettes     Last attempt to quit: 2005     Years since quittin.6     Smokeless tobacco: Never Used     Tobacco comment: no 2nd hand smoke exposure   Substance Use Topics     Alcohol use: Yes     Comment: occ.     Family History   Problem Relation Age of Onset     Arthritis Mother      Prostate Cancer Father      Hypertension Father      Cancer Father         bone     Diabetes Maternal Grandfather      Thyroid Disease Sister          Current Outpatient Medications   Medication Sig Dispense Refill     amLODIPine (NORVASC) 5 MG tablet Take 1 tablet (5 mg) by mouth daily 90 tablet 1     aspirin 81 MG tablet Take 1 tablet by mouth daily.       furosemide (LASIX) 20 MG tablet Take 1 tablet (20 mg) by mouth daily 30 tablet 1     hydrochlorothiazide (HYDRODIURIL) 25 MG tablet Take 1 tablet (25 mg) by mouth daily 90 tablet 1     levothyroxine (SYNTHROID/LEVOTHROID) 125 MCG tablet Take 1 tablet (125 mcg) by mouth daily 90 tablet 3     losartan (COZAAR) 100 MG tablet Take 1 tablet (100 mg) by mouth daily 90 tablet 1     metoprolol succinate ER (TOPROL-XL) 100 MG 24 hr tablet Take 1 tablet (100 mg) by mouth daily 90 tablet 1     order for DME Equipment being ordered: compression stockings / mild compression 1 Units 3     simvastatin (ZOCOR) 40 MG tablet Take 1 tablet (40 mg) by mouth At Bedtime 90 tablet 1     CALCIUM PO        valACYclovir (VALTREX) 1000 mg tablet Take 2 tablets (2,000 mg) by mouth 2 times daily (Patient not taking: Reported on 2020) 30 tablet 3     VENTOLIN  (90 Base) MCG/ACT inhaler INHALE 2 PUFFS BY INHALATION ROUTE EVERY 4 HOURS AS NEEDED FOR SHORTNESS OF BREATH / DYSPNEA OR WHEEZING. USE WITH SPACER. (Patient not taking: Reported on 2020) 18 g 0     Allergies   Allergen Reactions     Amoxicillin      Erythromycin          Reviewed and  updated as needed this visit by Provider  Tobacco  Allergies  Meds  Problems  Med Hx  Surg Hx  Fam Hx         Review of Systems   Constitutional, HEENT, cardiovascular, pulmonary, gi and gu systems are negative, except as otherwise noted.      Objective    /68   Pulse 67   Wt 103 kg (227 lb)   SpO2 98%   BMI 37.77 kg/m    Body mass index is 37.77 kg/m .  Physical Exam   GENERAL: healthy, alert and no distress  EYES: Eyes grossly normal to inspection, PERRL and conjunctivae and sclerae normal  HENT: ear canals and TM's normal, nose and mouth without ulcers or lesions  NECK: no adenopathy, no asymmetry, masses, or scars and thyroid normal to palpation  RESP: lungs clear to auscultation - no rales, rhonchi or wheezes  CV: regular rate and rhythm, normal S1 S2, no S3 or S4, no murmur, click or rub, no peripheral edema and peripheral pulses strong  MS: no gross musculoskeletal defects noted, mild non-pitting edema to BLE, R>L.     Right foot- Mild swelling, difficult to differentiate from her baseline mild LE edema.  Slightly TTP along lateral aspect.  Normal ROM toes and ankle.  No erythema or ecchymosis.    SKIN: no suspicious lesions or rashes  NEURO: Normal strength and tone, mentation intact and speech normal  PSYCH: mentation appears normal, affect normal/bright    Diagnostic Test Results:  Labs reviewed in Epic        Assessment & Plan     (M79.671) Right foot pain  (primary encounter diagnosis) / (W19.XXXA) Fall, initial encounter  Comment: Mechanical fall, stepped in hole.  Pain x 1 month, although improving.   Plan: XR Foot Right G/E 3 Views        Check xray.  Advised on elevation, ibuprofen, ice.  If xray normal, continued conservative management.     (R60.0) Bilateral edema of lower extremity  Comment: Likely dependent/venous insufficiency.  Has used lasix in past, asks for refill.  Has not tried compression stockings.   Plan: furosemide (LASIX) 20 MG tablet        Refilled lasix, asked to  use sparingly.  Strongly encouraged her to try some knee-high compression, can be purchased online easily, recommended 20-30 mmHg.       See Patient Instructions  Patient Instructions   Xray today- will let you know results.      Continue elevating throughout the day.  Apply ice at least twice daily, no more than 15 minutes at a time.     Can take ibuprofen 400-600 mg every 6 hours to help with pain and inflammation.  Take with food.    Consider knee high compression stockings.  Can be purchased online.  Strength of 20-30 mmHg.   Only use lasix occasionally as can be hard on kidneys and make you lose electrolytes.            Return in about 2 weeks (around 7/23/2020) for If failure to improve.    Sima Alejandra, CNP  [facility]

## 2020-07-09 ENCOUNTER — ANCILLARY PROCEDURE (OUTPATIENT)
Dept: GENERAL RADIOLOGY | Facility: CLINIC | Age: 65
End: 2020-07-09
Attending: NURSE PRACTITIONER
Payer: COMMERCIAL

## 2020-07-09 ENCOUNTER — TELEPHONE (OUTPATIENT)
Dept: INTERNAL MEDICINE | Facility: CLINIC | Age: 65
End: 2020-07-09

## 2020-07-09 ENCOUNTER — OFFICE VISIT (OUTPATIENT)
Dept: FAMILY MEDICINE | Facility: CLINIC | Age: 65
End: 2020-07-09
Payer: COMMERCIAL

## 2020-07-09 VITALS
DIASTOLIC BLOOD PRESSURE: 68 MMHG | WEIGHT: 227 LBS | SYSTOLIC BLOOD PRESSURE: 138 MMHG | BODY MASS INDEX: 37.77 KG/M2 | HEART RATE: 67 BPM | OXYGEN SATURATION: 98 %
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
## 2020-07-09 DIAGNOSIS — M79.671 RIGHT FOOT PAIN: Primary | ICD-10-CM

## 2020-07-09 DIAGNOSIS — S92.354A NONDISPLACED FRACTURE OF FIFTH METATARSAL BONE, RIGHT FOOT, INITIAL ENCOUNTER FOR CLOSED FRACTURE: Primary | ICD-10-CM

## 2020-07-09 DIAGNOSIS — R60.0 BILATERAL EDEMA OF LOWER EXTREMITY: ICD-10-CM

## 2020-07-09 DIAGNOSIS — M79.671 RIGHT FOOT PAIN: ICD-10-CM

## 2020-07-09 PROCEDURE — 73630 X-RAY EXAM OF FOOT: CPT | Mod: RT

## 2020-07-09 PROCEDURE — 99214 OFFICE O/P EST MOD 30 MIN: CPT | Performed by: NURSE PRACTITIONER

## 2020-07-09 RX ORDER — FUROSEMIDE 20 MG
20 TABLET ORAL DAILY
Qty: 30 TABLET | Refills: 1 | Status: SHIPPED | OUTPATIENT
Start: 2020-07-09 | End: 2021-07-16

## 2020-07-09 NOTE — TELEPHONE ENCOUNTER
Pt notified via phone of results and plan as written in provider note below. Pt notified she can expect a call for scheduling assistance within 1 business day, but also given phone number on referral if needed. Pt indicates understanding of issues and agrees with the plan.    XOCHITL CollinsN, RN

## 2020-07-09 NOTE — TELEPHONE ENCOUNTER
Please let patient know that xray shows that there is a small broken bone in her foot.  This is along the outer-most bone in her foot, right where she is having pain. I'd like her to see podiatry to see if anything needs to be done about it. I think for now nothing special she should do except make sure she is wearing really good supportive shoes like tennis shoes, take ibuprofen and apply ice as we discussed.  Referral placed. Thanks ! Sima Alejandra, CNP

## 2020-07-09 NOTE — TELEPHONE ENCOUNTER
Attempted to reach pt regarding provider message below. There was no answer. LM to return call to RN at [phone].    Shyanne Trejo, XOCHITLN, RN

## 2020-07-09 NOTE — PATIENT INSTRUCTIONS
Xray today- will let you know results.      Continue elevating throughout the day.  Apply ice at least twice daily, no more than 15 minutes at a time.     Can take ibuprofen 400-600 mg every 6 hours to help with pain and inflammation.  Take with food.    Consider knee high compression stockings.  Can be purchased online.  Strength of 20-30 mmHg.   Only use lasix occasionally as can be hard on kidneys and make you lose electrolytes.

## 2020-07-22 ENCOUNTER — ANCILLARY PROCEDURE (OUTPATIENT)
Dept: GENERAL RADIOLOGY | Facility: CLINIC | Age: 65
End: 2020-07-22
Attending: PODIATRIST
Payer: COMMERCIAL

## 2020-07-22 ENCOUNTER — OFFICE VISIT (OUTPATIENT)
Dept: PODIATRY | Facility: CLINIC | Age: 65
End: 2020-07-22
Attending: NURSE PRACTITIONER
Payer: COMMERCIAL

## 2020-07-22 VITALS — HEART RATE: 65 BPM | SYSTOLIC BLOOD PRESSURE: 136 MMHG | DIASTOLIC BLOOD PRESSURE: 70 MMHG

## 2020-07-22 DIAGNOSIS — S92.354A NONDISPLACED FRACTURE OF FIFTH METATARSAL BONE, RIGHT FOOT, INITIAL ENCOUNTER FOR CLOSED FRACTURE: ICD-10-CM

## 2020-07-22 PROCEDURE — 28470 CLTX METATARSAL FX WO MNP EA: CPT | Mod: RT | Performed by: PODIATRIST

## 2020-07-22 PROCEDURE — 99243 OFF/OP CNSLTJ NEW/EST LOW 30: CPT | Mod: 57 | Performed by: PODIATRIST

## 2020-07-22 PROCEDURE — 73630 X-RAY EXAM OF FOOT: CPT | Mod: RT

## 2020-07-22 NOTE — PATIENT INSTRUCTIONS
We wish you continued good healing. If you have any questions or concerns, please do not hesitate to contact us at [phone]    Please remember to call and schedule a follow up appointment if one was recommended at your earliest convenience.   PODIATRY CLINIC HOURS  TELEPHONE NUMBER    Dr. Nick Bryson D.P.M [facility]    Clinics:  [facility]    Pati Goodwin [facility]   Tuesday 1PM-6PM  [place]  Wednesday 7AM-2PM  [facility]  Thursday 10AM-6PM  [place]  Friday 7AM-3PM  [place]  Specialty schedulers:   [phone] to make an appointment with any Specialty Provider.        Urgent Care locations:    [facility] Monday-Friday 5 pm - 9 pm. Saturday-Sunday 9 am -5pm    Monday-Friday 11 am - 9 pm Saturday 9 am - 5 pm     Monday-Sunday 12 noon-8PM [phone]     If you need a medication refill, please contact us you may need lab work and/or a follow up visit prior to your refill (i.e. Antifungal medications).    [employer] (secure e-mail communication and access to your chart) to send a message or to make an appointment.    If MRI needed please call [facility] Roland at [phone]

## 2020-07-22 NOTE — PROGRESS NOTES
Subjective:    Pt is seen today in consult from Sima Soni for 5th met fx right foot.  Approximately 6 weeks ago patient was walking and fell in hole.  Had swelling, pain, and ecchymosis.  She points to the base of her fifth metatarsal.  Aggravated by activity and relieved by rest.   Seen in clinic, xrays taken, and diagnosed with proximal right fifth metatarsal fracture.  She states pain and swelling is much better now.  She is just wearing slip on malleable shoes.  She denies any weakness or increased deformity.  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        ROS:  A 10-point review of systems was performed and is positive for that noted in the HPI and as seen above.  All other areas are negative.          Allergies   Allergen Reactions     Amoxicillin      Erythromycin        Current Outpatient Medications   Medication Sig Dispense Refill     amLODIPine (NORVASC) 5 MG tablet Take 1 tablet (5 mg) by mouth daily 90 tablet 1     aspirin 81 MG tablet Take 1 tablet by mouth daily.       CALCIUM PO        furosemide (LASIX) 20 MG tablet Take 1 tablet (20 mg) by mouth daily 30 tablet 1     hydrochlorothiazide (HYDRODIURIL) 25 MG tablet Take 1 tablet (25 mg) by mouth daily 90 tablet 1     levothyroxine (SYNTHROID/LEVOTHROID) 125 MCG tablet Take 1 tablet (125 mcg) by mouth daily 90 tablet 3     losartan (COZAAR) 100 MG tablet Take 1 tablet (100 mg) by mouth daily 90 tablet 1     metoprolol succinate ER (TOPROL-XL) 100 MG 24 hr tablet Take 1 tablet (100 mg) by mouth daily 90 tablet 1     order for DME Equipment being ordered: compression stockings / mild compression 1 Units 3     simvastatin (ZOCOR) 40 MG tablet Take 1 tablet (40 mg) by mouth At Bedtime 90 tablet 1     valACYclovir (VALTREX) 1000 mg tablet Take 2 tablets (2,000 mg) by mouth 2 times daily (Patient not taking: Reported on 4/6/2020) 30 tablet 3     VENTOLIN  (90 Base) MCG/ACT inhaler INHALE 2 PUFFS BY INHALATION ROUTE EVERY 4 HOURS AS  NEEDED FOR SHORTNESS OF BREATH / DYSPNEA OR WHEEZING. USE WITH SPACER. (Patient not taking: Reported on 2020) 18 g 0       Patient Active Problem List   Diagnosis     Hyperlipidemia LDL goal <130     Advanced directives, counseling/discussion     H/O: hysterectomy     Vitamin D deficiency     Hypothyroidism, unspecified hypothyroidism type     History of colonic polyps     Essential hypertension with goal blood pressure less than 140/90     Breast hypertrophy     Obesity (BMI 35.0-39.9) with comorbidity (H)     Asymptomatic postmenopausal status     Hypothyroid     Shortness of breath     Esophageal reflux     Chest pain, unspecified       Past Medical History:   Diagnosis Date     High cholesterol      HTN      Hypothyroid      Pulmonary actinomycosis (H)      Seasonal allergies        Past Surgical History:   Procedure Laterality Date     HYSTERECTOMY, PAP NO LONGER INDICATED       SURGICAL HISTORY OF -       thoracic spine       Family History   Problem Relation Age of Onset     Arthritis Mother      Prostate Cancer Father      Hypertension Father      Cancer Father         bone     Diabetes Maternal Grandfather      Thyroid Disease Sister        Social History     Tobacco Use     Smoking status: Former Smoker     Types: Cigarettes     Last attempt to quit: 2005     Years since quittin.6     Smokeless tobacco: Never Used     Tobacco comment: no 2nd hand smoke exposure   Substance Use Topics     Alcohol use: Yes     Comment: occ.         Exam:    Vitals: /70   Pulse 65   BMI: There is no height or weight on file to calculate BMI.  Height: Data Unavailable    Constitutional/ general:  Pt is in no apparent distress, appears well-nourished.  Cooperative with history and physical exam.     Psych:  The patient answered questions appropriately.  Normal affect.  Seems to have reasonable expectations, in terms of treatment.     Eyes:  Visual scanning/ tracking without deficit.     Ears:  Response to  auditory stimuli is normal.  negative hearing aid devices.  Auricles in proper alignment.     Lymphatic:  Popliteal lymph nodes not enlarged.     Lungs:  Non labored breathing, non labored speech. No cough.  No audible wheezing. Even, quiet breathing.       Vascular:  positive pedal pulses bilaterally for both the DP and PT arteries.  CFT < 3 sec.  positive ankle edema.  positive pedal hair growth.    Neuro:  Alert and oriented x 3. Coordinated gait.  Light touch sensation is intact to the L4, L5, S1 distributions. No obvious deficits.  No evidence of neurological-based weakness, spasticity, or contracture in the lower extremities.     Derm: Normal texture and turgor.  No erythema, ecchymosis, or cyanosis.      Musculoskeletal:    Lower extremity muscle strength is normal.  Patient is ambulatory without an assistive device or brace.  No gross deformities.  Normal arch.       Decent muscle strength and ROM to all areas tested.  Pain upon palpation to base of right 5th metatarsal.  Slight edema noted.  No echymosis noted.  No erythema.  No sign of ulceration, infection, or drainage.     Radiographic Exam:  X-Ray Findings:  I personally reviewed the films.  Nondisplaced avulsion fracture at the base of the fifth metatarsal.  Some consolidation noted compared to previous x-ray    Assessment:  Fifth metatarsal styloid process avulsion fracture left foot     Plan:  X-rays taken today of left foot.  Discussed patient already feeling significantly better.  We pointed out the fracture to the patient and discussed is not fully healed.  We discussed an ankle brace but she declines.  She will wear good supportive shoe and be careful not to reinjure this.  F/U 4 wks for repeat x-ray.  Fracture care.  Thank you for allowing me participate in the care of this patient.        Nick Bryson, SUSAN DAVIS, FACFAS

## 2020-07-22 NOTE — LETTER
7/22/2020         RE: Migdalia Escalera  [address] [facility] [id number]        Dear Colleague,    Thank you for referring your patient, Migdalia Escalera, to the [facility]. Please see a copy of my visit note below.    Subjective:    Pt is seen today in consult from Sima Soni for 5th met fx right foot.  Approximately 6 weeks ago patient was walking and fell in hole.  Had swelling, pain, and ecchymosis.  She points to the base of her fifth metatarsal.  Aggravated by activity and relieved by rest.   Seen in clinic, xrays taken, and diagnosed with proximal right fifth metatarsal fracture.  She states pain and swelling is much better now.  She is just wearing slip on malleable shoes.  She denies any weakness or increased deformity.  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        ROS:  A 10-point review of systems was performed and is positive for that noted in the HPI and as seen above.  All other areas are negative.          Allergies   Allergen Reactions     Amoxicillin      Erythromycin        Current Outpatient Medications   Medication Sig Dispense Refill     amLODIPine (NORVASC) 5 MG tablet Take 1 tablet (5 mg) by mouth daily 90 tablet 1     aspirin 81 MG tablet Take 1 tablet by mouth daily.       CALCIUM PO        furosemide (LASIX) 20 MG tablet Take 1 tablet (20 mg) by mouth daily 30 tablet 1     hydrochlorothiazide (HYDRODIURIL) 25 MG tablet Take 1 tablet (25 mg) by mouth daily 90 tablet 1     levothyroxine (SYNTHROID/LEVOTHROID) 125 MCG tablet Take 1 tablet (125 mcg) by mouth daily 90 tablet 3     losartan (COZAAR) 100 MG tablet Take 1 tablet (100 mg) by mouth daily 90 tablet 1     metoprolol succinate ER (TOPROL-XL) 100 MG 24 hr tablet Take 1 tablet (100 mg) by mouth daily 90 tablet 1     order for DME Equipment being ordered: compression stockings / mild compression 1 Units 3     simvastatin (ZOCOR) 40 MG tablet Take 1 tablet (40 mg) by mouth At Bedtime 90 tablet 1      valACYclovir (VALTREX) 1000 mg tablet Take 2 tablets (2,000 mg) by mouth 2 times daily (Patient not taking: Reported on 2020) 30 tablet 3     VENTOLIN  (90 Base) MCG/ACT inhaler INHALE 2 PUFFS BY INHALATION ROUTE EVERY 4 HOURS AS NEEDED FOR SHORTNESS OF BREATH / DYSPNEA OR WHEEZING. USE WITH SPACER. (Patient not taking: Reported on 2020) 18 g 0       Patient Active Problem List   Diagnosis     Hyperlipidemia LDL goal <130     Advanced directives, counseling/discussion     H/O: hysterectomy     Vitamin D deficiency     Hypothyroidism, unspecified hypothyroidism type     History of colonic polyps     Essential hypertension with goal blood pressure less than 140/90     Breast hypertrophy     Obesity (BMI 35.0-39.9) with comorbidity (H)     Asymptomatic postmenopausal status     Hypothyroid     Shortness of breath     Esophageal reflux     Chest pain, unspecified       Past Medical History:   Diagnosis Date     High cholesterol      HTN      Hypothyroid      Pulmonary actinomycosis (H)      Seasonal allergies        Past Surgical History:   Procedure Laterality Date     HYSTERECTOMY, PAP NO LONGER INDICATED       SURGICAL HISTORY OF -       thoracic spine       Family History   Problem Relation Age of Onset     Arthritis Mother      Prostate Cancer Father      Hypertension Father      Cancer Father         bone     Diabetes Maternal Grandfather      Thyroid Disease Sister        Social History     Tobacco Use     Smoking status: Former Smoker     Types: Cigarettes     Last attempt to quit: 2005     Years since quittin.6     Smokeless tobacco: Never Used     Tobacco comment: no 2nd hand smoke exposure   Substance Use Topics     Alcohol use: Yes     Comment: occ.         Exam:    Vitals: /70   Pulse 65   BMI: There is no height or weight on file to calculate BMI.  Height: Data Unavailable    Constitutional/ general:  Pt is in no apparent distress, appears well-nourished.  Cooperative with  history and physical exam.     Psych:  The patient answered questions appropriately.  Normal affect.  Seems to have reasonable expectations, in terms of treatment.     Eyes:  Visual scanning/ tracking without deficit.     Ears:  Response to auditory stimuli is normal.  negative hearing aid devices.  Auricles in proper alignment.     Lymphatic:  Popliteal lymph nodes not enlarged.     Lungs:  Non labored breathing, non labored speech. No cough.  No audible wheezing. Even, quiet breathing.       Vascular:  positive pedal pulses bilaterally for both the DP and PT arteries.  CFT < 3 sec.  positive ankle edema.  positive pedal hair growth.    Neuro:  Alert and oriented x 3. Coordinated gait.  Light touch sensation is intact to the L4, L5, S1 distributions. No obvious deficits.  No evidence of neurological-based weakness, spasticity, or contracture in the lower extremities.     Derm: Normal texture and turgor.  No erythema, ecchymosis, or cyanosis.      Musculoskeletal:    Lower extremity muscle strength is normal.  Patient is ambulatory without an assistive device or brace.  No gross deformities.  Normal arch.       Decent muscle strength and ROM to all areas tested.  Pain upon palpation to base of right 5th metatarsal.  Slight edema noted.  No echymosis noted.  No erythema.  No sign of ulceration, infection, or drainage.     Radiographic Exam:  X-Ray Findings:  I personally reviewed the films.  Nondisplaced avulsion fracture at the base of the fifth metatarsal.  Some consolidation noted compared to previous x-ray    Assessment:  Fifth metatarsal styloid process avulsion fracture left foot     Plan:  X-rays taken today of left foot.  Discussed patient already feeling significantly better.  We pointed out the fracture to the patient and discussed is not fully healed.  We discussed an ankle brace but she declines.  She will wear good supportive shoe and be careful not to reinjure this.  F/U 4 wks for repeat x-ray.   Fracture care.  Thank you for allowing me participate in the care of this patient.        Nick Bryson DPM DPM, FACFAS      Again, thank you for allowing me to participate in the care of your patient.        Sincerely,        Nick Bryson DPM

## 2020-08-11 ENCOUNTER — OFFICE VISIT (OUTPATIENT)
Dept: FAMILY MEDICINE | Facility: CLINIC | Age: 65
End: 2020-08-11
Payer: COMMERCIAL

## 2020-08-11 VITALS
HEART RATE: 68 BPM | DIASTOLIC BLOOD PRESSURE: 72 MMHG | TEMPERATURE: 96.6 F | SYSTOLIC BLOOD PRESSURE: 122 MMHG | OXYGEN SATURATION: 96 % | WEIGHT: 229 LBS | BODY MASS INDEX: 38.11 KG/M2

## 2020-08-11 DIAGNOSIS — I10 ESSENTIAL HYPERTENSION WITH GOAL BLOOD PRESSURE LESS THAN 140/90: Primary | ICD-10-CM

## 2020-08-11 DIAGNOSIS — E78.5 HYPERLIPIDEMIA LDL GOAL <130: ICD-10-CM

## 2020-08-11 LAB
ALBUMIN SERPL-MCNC: 3.5 G/DL (ref 3.4–5)
ALP SERPL-CCNC: 90 U/L (ref 40–150)
ALT SERPL W P-5'-P-CCNC: 26 U/L (ref 0–50)
ANION GAP SERPL CALCULATED.3IONS-SCNC: 6 MMOL/L (ref 3–14)
AST SERPL W P-5'-P-CCNC: 22 U/L (ref 0–45)
BILIRUB SERPL-MCNC: 0.4 MG/DL (ref 0.2–1.3)
BUN SERPL-MCNC: 15 MG/DL (ref 7–30)
CALCIUM SERPL-MCNC: 8.8 MG/DL (ref 8.5–10.1)
CHLORIDE SERPL-SCNC: 106 MMOL/L (ref 94–109)
CO2 SERPL-SCNC: 29 MMOL/L (ref 20–32)
CREAT SERPL-MCNC: 1.13 MG/DL (ref 0.52–1.04)
GFR SERPL CREATININE-BSD FRML MDRD: 50 ML/MIN/{1.73_M2}
GLUCOSE SERPL-MCNC: 91 MG/DL (ref 70–99)
POTASSIUM SERPL-SCNC: 4 MMOL/L (ref 3.4–5.3)
PROT SERPL-MCNC: 7.2 G/DL (ref 6.8–8.8)
SODIUM SERPL-SCNC: 141 MMOL/L (ref 133–144)

## 2020-08-11 PROCEDURE — 36415 COLL VENOUS BLD VENIPUNCTURE: CPT | Performed by: PHYSICIAN ASSISTANT

## 2020-08-11 PROCEDURE — 99214 OFFICE O/P EST MOD 30 MIN: CPT | Performed by: PHYSICIAN ASSISTANT

## 2020-08-11 PROCEDURE — 80053 COMPREHEN METABOLIC PANEL: CPT | Performed by: PHYSICIAN ASSISTANT

## 2020-08-11 RX ORDER — SIMVASTATIN 40 MG
40 TABLET ORAL AT BEDTIME
Qty: 90 TABLET | Refills: 1 | Status: SHIPPED | OUTPATIENT
Start: 2020-08-11 | End: 2021-02-09

## 2020-08-11 RX ORDER — METOPROLOL SUCCINATE 100 MG/1
100 TABLET, EXTENDED RELEASE ORAL DAILY
Qty: 90 TABLET | Refills: 1 | Status: SHIPPED | OUTPATIENT
Start: 2020-08-11 | End: 2021-02-09

## 2020-08-11 RX ORDER — HYDROCHLOROTHIAZIDE 25 MG/1
25 TABLET ORAL DAILY
Qty: 90 TABLET | Refills: 1 | Status: SHIPPED | OUTPATIENT
Start: 2020-08-11 | End: 2021-02-09

## 2020-08-11 RX ORDER — LOSARTAN POTASSIUM 100 MG/1
100 TABLET ORAL DAILY
Qty: 90 TABLET | Refills: 1 | Status: SHIPPED | OUTPATIENT
Start: 2020-08-11 | End: 2021-02-09

## 2020-08-11 ASSESSMENT — PAIN SCALES - GENERAL: PAINLEVEL: NO PAIN (0)

## 2020-08-11 NOTE — LETTER
August 13, 2020      Migdalia Escalera  [address] [place] [id number]        Dear ,    We are writing to inform you of your test results.    Migdalia,     All of your test results look good except for a mild change with your kidney function. This level will be checked again at your next appointment.   I advise that you avoid use of advil, aleve and similar over the counter anti-inflammatory medications. It is safe to use tylenol if you need.     If you have any questions or concerns, please contact the clinic at [phone].     Thank you,     Kristen Kehr PA-C / kvng    Resulted Orders   Comprehensive metabolic panel   Result Value Ref Range    Sodium 141 133 - 144 mmol/L    Potassium 4.0 3.4 - 5.3 mmol/L    Chloride 106 94 - 109 mmol/L    Carbon Dioxide 29 20 - 32 mmol/L    Anion Gap 6 3 - 14 mmol/L    Glucose 91 70 - 99 mg/dL      Comment:      Fasting specimen    Urea Nitrogen 15 7 - 30 mg/dL    Creatinine 1.13 (H) 0.52 - 1.04 mg/dL    GFR Estimate 50 (L) >60 mL/min/[1.73_m2]      Comment:      Non  GFR Calc  Starting 12/18/2018, serum creatinine based estimated GFR (eGFR) will be   calculated using the Chronic Kidney Disease Epidemiology Collaboration   (CKD-EPI) equation.      GFR Estimate If Black 58 (L) >60 mL/min/[1.73_m2]      Comment:       GFR Calc  Starting 12/18/2018, serum creatinine based estimated GFR (eGFR) will be   calculated using the Chronic Kidney Disease Epidemiology Collaboration   (CKD-EPI) equation.      Calcium 8.8 8.5 - 10.1 mg/dL    Bilirubin Total 0.4 0.2 - 1.3 mg/dL    Albumin 3.5 3.4 - 5.0 g/dL    Protein Total 7.2 6.8 - 8.8 g/dL    Alkaline Phosphatase 90 40 - 150 U/L    ALT 26 0 - 50 U/L    AST 22 0 - 45 U/L       If you have any questions or concerns, please call the clinic at the number listed above.       Sincerely,        Kristen M. Kehr, PA-C

## 2020-08-11 NOTE — PROGRESS NOTES
Subjective     Migdalia Escalera is a 65 year old female who presents to clinic today for the following health issues:    She will need refills of her medications.   She was seen last month for fracture of her foot. She was given a refill of the lasix, but she has not taken it since the compression stockings are working well.     HPI       Hypertension Follow-up      Do you check your blood pressure regularly outside of the clinic? No     Are you following a low salt diet? Yes    Are your blood pressures ever more than 140 on the top number (systolic) OR more   than 90 on the bottom number (diastolic), for example 140/90?     How many days per week do you miss taking your medication? 1    What makes it hard for you to take your medications?  remembering to take        Patient Active Problem List   Diagnosis     Hyperlipidemia LDL goal <130     Advanced directives, counseling/discussion     H/O: hysterectomy     Vitamin D deficiency     Hypothyroidism, unspecified hypothyroidism type     History of colonic polyps     Essential hypertension with goal blood pressure less than 140/90     Breast hypertrophy     Obesity (BMI 35.0-39.9) with comorbidity (H)     Asymptomatic postmenopausal status     Hypothyroid     Shortness of breath     Esophageal reflux     Chest pain, unspecified     Past Surgical History:   Procedure Laterality Date     HYSTERECTOMY, PAP NO LONGER INDICATED       SURGICAL HISTORY OF -       thoracic spine       Social History     Tobacco Use     Smoking status: Former Smoker     Types: Cigarettes     Last attempt to quit: 2005     Years since quittin.7     Smokeless tobacco: Never Used     Tobacco comment: no 2nd hand smoke exposure   Substance Use Topics     Alcohol use: Yes     Comment: occ.     Family History   Problem Relation Age of Onset     Arthritis Mother      Prostate Cancer Father      Hypertension Father      Cancer Father         bone     Diabetes Maternal Grandfather      Thyroid  Disease Sister          Current Outpatient Medications   Medication Sig Dispense Refill     amLODIPine (NORVASC) 5 MG tablet Take 1 tablet (5 mg) by mouth daily 90 tablet 1     aspirin 81 MG tablet Take 1 tablet by mouth daily.       CALCIUM PO        furosemide (LASIX) 20 MG tablet Take 1 tablet (20 mg) by mouth daily 30 tablet 1     hydrochlorothiazide (HYDRODIURIL) 25 MG tablet Take 1 tablet (25 mg) by mouth daily 90 tablet 1     levothyroxine (SYNTHROID/LEVOTHROID) 125 MCG tablet Take 1 tablet (125 mcg) by mouth daily 90 tablet 3     losartan (COZAAR) 100 MG tablet Take 1 tablet (100 mg) by mouth daily 90 tablet 1     metoprolol succinate ER (TOPROL-XL) 100 MG 24 hr tablet Take 1 tablet (100 mg) by mouth daily 90 tablet 1     simvastatin (ZOCOR) 40 MG tablet Take 1 tablet (40 mg) by mouth At Bedtime 90 tablet 1     valACYclovir (VALTREX) 1000 mg tablet Take 2 tablets (2,000 mg) by mouth 2 times daily 30 tablet 3     VENTOLIN  (90 Base) MCG/ACT inhaler INHALE 2 PUFFS BY INHALATION ROUTE EVERY 4 HOURS AS NEEDED FOR SHORTNESS OF BREATH / DYSPNEA OR WHEEZING. USE WITH SPACER. 18 g 0     order for DME Equipment being ordered: compression stockings / mild compression 1 Units 3     Allergies   Allergen Reactions     Amoxicillin      Erythromycin      Recent Labs   Lab Test 02/06/20  0806 08/08/19  0806 07/01/19  0806  01/29/19  0845 03/29/18  0830  10/20/16  0943   LDL 79  --   --   --  101* 109*   < > 121*   HDL 77  --   --   --  75 75   < > 75   TRIG 116  --   --   --  131 93   < > 112   ALT  --  28  --   --   --  28  --  26   CR 0.82 0.95  --   --  1.01 0.76   < > 0.86   GFRESTIMATED 74 62  --   --  58* 76   < > 66   GFRESTBLACK 86 72  --   --  67 >90   < > 80   POTASSIUM 4.1 4.3  --   --  4.1 4.1   < > 4.0   TSH 0.44  --  4.30*   < >  --  2.53   < >  --     < > = values in this interval not displayed.      BP Readings from Last 3 Encounters:   08/11/20 122/72   07/22/20 136/70   07/09/20 138/68    Wt  Readings from Last 3 Encounters:   08/11/20 103.9 kg (229 lb)   07/09/20 103 kg (227 lb)   02/13/20 103.9 kg (229 lb)                    Reviewed and updated as needed this visit by Provider         Review of Systems   Constitutional, HEENT, cardiovascular, pulmonary, GI, , musculoskeletal, neuro, skin, endocrine and psych systems are negative, except as otherwise noted.      Objective    /72   Pulse 68   Temp 96.6  F (35.9  C) (Tympanic)   Wt 103.9 kg (229 lb)   SpO2 96%   BMI 38.11 kg/m    Body mass index is 38.11 kg/m .  Physical Exam   GENERAL: healthy, alert and no distress  RESP: lungs clear to auscultation - no rales, rhonchi or wheezes  CV: regular rate and rhythm, normal S1 S2, no S3 or S4, no murmur, click or rub, no peripheral edema and peripheral pulses strong  SKIN: no suspicious lesions or rashes  PSYCH: mentation appears normal, affect normal/bright    Diagnostic Test Results:  Labs reviewed in Epic        Assessment & Plan     1. Essential hypertension with goal blood pressure less than 140/90  Stable, refills of all of her medications given x 6 months.   CMP completed today.   Plan for fasting tests and medication check again in 6 months. Continue use of the compression stockings for edema. She has not used the lasix. Discussed possible issues with potassium with both diuretics and to be used with caution.   - Comprehensive metabolic panel  - hydrochlorothiazide (HYDRODIURIL) 25 MG tablet; Take 1 tablet (25 mg) by mouth daily  Dispense: 90 tablet; Refill: 1  - losartan (COZAAR) 100 MG tablet; Take 1 tablet (100 mg) by mouth daily  Dispense: 90 tablet; Refill: 1  - metoprolol succinate ER (TOPROL-XL) 100 MG 24 hr tablet; Take 1 tablet (100 mg) by mouth daily  Dispense: 90 tablet; Refill: 1    2. Hyperlipidemia LDL goal <130  Stable, refills given  - simvastatin (ZOCOR) 40 MG tablet; Take 1 tablet (40 mg) by mouth At Bedtime  Dispense: 90 tablet; Refill: 1         Return in about 6 months  (around 2/11/2021) for fasting lab appointment, medication check.    Kristen M. Kehr, PA-C  [facility]

## 2020-08-12 NOTE — RESULT ENCOUNTER NOTE
Please send a letter to Migdalia with the following:    Migdalia,    All of your test results look good except for a mild change with your kidney function. This level will be checked again at your next appointment.   I advise that you avoid use of advil, aleve and similar over the counter anti-inflammatory medications. It is safe to use tylenol if you need.     If you have any questions or concerns, please contact the clinic at [phone].    Thank you,        Kristen Kehr PA-C

## 2020-08-15 DIAGNOSIS — I10 ESSENTIAL HYPERTENSION WITH GOAL BLOOD PRESSURE LESS THAN 140/90: ICD-10-CM

## 2020-08-17 RX ORDER — AMLODIPINE BESYLATE 5 MG/1
5 TABLET ORAL DAILY
Qty: 90 TABLET | Refills: 0 | OUTPATIENT
Start: 2020-08-17

## 2020-08-18 ENCOUNTER — TELEPHONE (OUTPATIENT)
Dept: FAMILY MEDICINE | Facility: CLINIC | Age: 65
End: 2020-08-18

## 2020-08-18 DIAGNOSIS — I10 ESSENTIAL HYPERTENSION WITH GOAL BLOOD PRESSURE LESS THAN 140/90: ICD-10-CM

## 2020-08-18 RX ORDER — AMLODIPINE BESYLATE 5 MG/1
5 TABLET ORAL DAILY
Qty: 90 TABLET | Refills: 1 | Status: SHIPPED | OUTPATIENT
Start: 2020-08-18 | End: 2021-02-09

## 2020-08-18 NOTE — TELEPHONE ENCOUNTER
Message: We did not receive RX dated 4/22/20. Pt has not active RXs on file. Norvasc Oral 5mg tab. Please send.

## 2020-08-19 ENCOUNTER — ANCILLARY PROCEDURE (OUTPATIENT)
Dept: GENERAL RADIOLOGY | Facility: CLINIC | Age: 65
End: 2020-08-19
Attending: PODIATRIST
Payer: COMMERCIAL

## 2020-08-19 ENCOUNTER — OFFICE VISIT (OUTPATIENT)
Dept: PODIATRY | Facility: CLINIC | Age: 65
End: 2020-08-19
Payer: COMMERCIAL

## 2020-08-19 VITALS
HEART RATE: 64 BPM | WEIGHT: 229 LBS | DIASTOLIC BLOOD PRESSURE: 77 MMHG | BODY MASS INDEX: 38.11 KG/M2 | SYSTOLIC BLOOD PRESSURE: 132 MMHG

## 2020-08-19 DIAGNOSIS — S92.354A NONDISPLACED FRACTURE OF FIFTH METATARSAL BONE, RIGHT FOOT, INITIAL ENCOUNTER FOR CLOSED FRACTURE: Primary | ICD-10-CM

## 2020-08-19 DIAGNOSIS — S92.354A NONDISPLACED FRACTURE OF FIFTH METATARSAL BONE, RIGHT FOOT, INITIAL ENCOUNTER FOR CLOSED FRACTURE: ICD-10-CM

## 2020-08-19 PROCEDURE — 73630 X-RAY EXAM OF FOOT: CPT | Mod: RT

## 2020-08-19 PROCEDURE — 99207 ZZC FRACTURE CARE IN GLOBAL PERIOD: CPT | Performed by: PODIATRIST

## 2020-08-19 NOTE — PROGRESS NOTES
Subjective:    7/22/20   Pt is seen today in consult from Sima Soni for 5th met fx right foot.  Approximately 6 weeks ago patient was walking and fell in hole.  Had swelling, pain, and ecchymosis.  She points to the base of her fifth metatarsal.  Aggravated by activity and relieved by rest.   Seen in clinic, xrays taken, and diagnosed with proximal right fifth metatarsal fracture.  She states pain and swelling is much better now.  She is just wearing slip on malleable shoes.  She denies any weakness or increased deformity.  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     8/19/20 patient walking in regular shoe now.  She has no pain.  The swelling is decreasing.  She denies any instability or weakness.  She has no other new complaints.  Today no            ROS:  See above         Allergies   Allergen Reactions     Amoxicillin      Erythromycin        Current Outpatient Medications   Medication Sig Dispense Refill     amLODIPine (NORVASC) 5 MG tablet Take 1 tablet (5 mg) by mouth daily 90 tablet 1     aspirin 81 MG tablet Take 1 tablet by mouth daily.       CALCIUM PO        furosemide (LASIX) 20 MG tablet Take 1 tablet (20 mg) by mouth daily 30 tablet 1     hydrochlorothiazide (HYDRODIURIL) 25 MG tablet Take 1 tablet (25 mg) by mouth daily 90 tablet 1     levothyroxine (SYNTHROID/LEVOTHROID) 125 MCG tablet Take 1 tablet (125 mcg) by mouth daily 90 tablet 3     losartan (COZAAR) 100 MG tablet Take 1 tablet (100 mg) by mouth daily 90 tablet 1     metoprolol succinate ER (TOPROL-XL) 100 MG 24 hr tablet Take 1 tablet (100 mg) by mouth daily 90 tablet 1     order for DME Equipment being ordered: compression stockings / mild compression 1 Units 3     simvastatin (ZOCOR) 40 MG tablet Take 1 tablet (40 mg) by mouth At Bedtime 90 tablet 1     valACYclovir (VALTREX) 1000 mg tablet Take 2 tablets (2,000 mg) by mouth 2 times daily 30 tablet 3     VENTOLIN  (90 Base) MCG/ACT inhaler INHALE 2 PUFFS BY INHALATION  ROUTE EVERY 4 HOURS AS NEEDED FOR SHORTNESS OF BREATH / DYSPNEA OR WHEEZING. USE WITH SPACER. 18 g 0       Patient Active Problem List   Diagnosis     Hyperlipidemia LDL goal <130     Advanced directives, counseling/discussion     H/O: hysterectomy     Vitamin D deficiency     Hypothyroidism, unspecified hypothyroidism type     History of colonic polyps     Essential hypertension with goal blood pressure less than 140/90     Breast hypertrophy     Obesity (BMI 35.0-39.9) with comorbidity (H)     Asymptomatic postmenopausal status     Hypothyroid     Shortness of breath     Esophageal reflux     Chest pain, unspecified       Past Medical History:   Diagnosis Date     High cholesterol      HTN      Hypothyroid      Pulmonary actinomycosis (H)      Seasonal allergies        Past Surgical History:   Procedure Laterality Date     HYSTERECTOMY, PAP NO LONGER INDICATED       SURGICAL HISTORY OF -       thoracic spine       Family History   Problem Relation Age of Onset     Arthritis Mother      Prostate Cancer Father      Hypertension Father      Cancer Father         bone     Diabetes Maternal Grandfather      Thyroid Disease Sister        Social History     Tobacco Use     Smoking status: Former Smoker     Types: Cigarettes     Last attempt to quit: 2005     Years since quittin.7     Smokeless tobacco: Never Used     Tobacco comment: no 2nd hand smoke exposure   Substance Use Topics     Alcohol use: Yes     Comment: occ.         Exam:    Vitals: /77   Pulse 64   Wt 103.9 kg (229 lb)   BMI 38.11 kg/m    BMI: Body mass index is 38.11 kg/m .  Height: Data Unavailable    Constitutional/ general:  Pt is in no apparent distress, appears well-nourished.  Cooperative with history and physical exam.     Psych:  The patient answered questions appropriately.  Normal affect.  Seems to have reasonable expectations, in terms of treatment.     Eyes:  Visual scanning/ tracking without deficit.     Ears:  Response to  auditory stimuli is normal.  negative hearing aid devices.  Auricles in proper alignment.     Lymphatic:  Popliteal lymph nodes not enlarged.     Lungs:  Non labored breathing, non labored speech. No cough.  No audible wheezing. Even, quiet breathing.       Vascular:  positive pedal pulses bilaterally for both the DP and PT arteries.  CFT < 3 sec.  positive ankle edema.  positive pedal hair growth.    Neuro:  Alert and oriented x 3. Coordinated gait.  Light touch sensation is intact to the L4, L5, S1 distributions. No obvious deficits.  No evidence of neurological-based weakness, spasticity, or contracture in the lower extremities.     Derm: Normal texture and turgor.  No erythema, ecchymosis, or cyanosis.      Musculoskeletal:    Lower extremity muscle strength is normal.  Patient is ambulatory without an assistive device or brace.  No gross deformities.  Normal arch.       Decent muscle strength and ROM to all areas tested.,  Pain upon palpation to base of right 5th metatarsal.  Slight edema noted, but decreased since last visit.  No echymosis noted.  No erythema.  No sign of ulceration, infection, or drainage.     Radiographic Exam:  X-Ray Findings:  I personally reviewed the films.  Nondisplaced avulsion fracture at the base of the fifth metatarsal.  Some consolidation noted compared to previous x-ray.  Yet to have complete healing    Assessment:  Fifth metatarsal styloid process avulsion fracture left foot     Plan:  X-rays taken today of left foot.  Discussed patient that clinically she is much better.  Discussed radiographs show decreased bone conspicuity across the fracture site.  She will only do gentle walking for the next month.  If no pain or swelling  For now she will slowly increase her activities.  She has any lateral instability discussed physical therapy can help this and she can call if she would like to start this.  Warned her not to reinjure this.  RTC PRN      Nick Bryson, SUSAN DPM,  FACFAS

## 2020-08-19 NOTE — LETTER
8/19/2020         RE: Migdalia Escalera  [address] [facility] [id number]        Dear Colleague,    Thank you for referring your patient, Migdalia Escalera, to the [facility]. Please see a copy of my visit note below.    Subjective:    7/22/20   Pt is seen today in consult from Sima Soni for 5th met fx right foot.  Approximately 6 weeks ago patient was walking and fell in hole.  Had swelling, pain, and ecchymosis.  She points to the base of her fifth metatarsal.  Aggravated by activity and relieved by rest.   Seen in clinic, xrays taken, and diagnosed with proximal right fifth metatarsal fracture.  She states pain and swelling is much better now.  She is just wearing slip on malleable shoes.  She denies any weakness or increased deformity.  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     8/19/20 patient walking in regular shoe now.  She has no pain.  The swelling is decreasing.  She denies any instability or weakness.  She has no other new complaints.  Today no            ROS:  See above         Allergies   Allergen Reactions     Amoxicillin      Erythromycin        Current Outpatient Medications   Medication Sig Dispense Refill     amLODIPine (NORVASC) 5 MG tablet Take 1 tablet (5 mg) by mouth daily 90 tablet 1     aspirin 81 MG tablet Take 1 tablet by mouth daily.       CALCIUM PO        furosemide (LASIX) 20 MG tablet Take 1 tablet (20 mg) by mouth daily 30 tablet 1     hydrochlorothiazide (HYDRODIURIL) 25 MG tablet Take 1 tablet (25 mg) by mouth daily 90 tablet 1     levothyroxine (SYNTHROID/LEVOTHROID) 125 MCG tablet Take 1 tablet (125 mcg) by mouth daily 90 tablet 3     losartan (COZAAR) 100 MG tablet Take 1 tablet (100 mg) by mouth daily 90 tablet 1     metoprolol succinate ER (TOPROL-XL) 100 MG 24 hr tablet Take 1 tablet (100 mg) by mouth daily 90 tablet 1     order for DME Equipment being ordered: compression stockings / mild compression 1 Units 3     simvastatin (ZOCOR) 40 MG  tablet Take 1 tablet (40 mg) by mouth At Bedtime 90 tablet 1     valACYclovir (VALTREX) 1000 mg tablet Take 2 tablets (2,000 mg) by mouth 2 times daily 30 tablet 3     VENTOLIN  (90 Base) MCG/ACT inhaler INHALE 2 PUFFS BY INHALATION ROUTE EVERY 4 HOURS AS NEEDED FOR SHORTNESS OF BREATH / DYSPNEA OR WHEEZING. USE WITH SPACER. 18 g 0       Patient Active Problem List   Diagnosis     Hyperlipidemia LDL goal <130     Advanced directives, counseling/discussion     H/O: hysterectomy     Vitamin D deficiency     Hypothyroidism, unspecified hypothyroidism type     History of colonic polyps     Essential hypertension with goal blood pressure less than 140/90     Breast hypertrophy     Obesity (BMI 35.0-39.9) with comorbidity (H)     Asymptomatic postmenopausal status     Hypothyroid     Shortness of breath     Esophageal reflux     Chest pain, unspecified       Past Medical History:   Diagnosis Date     High cholesterol      HTN      Hypothyroid      Pulmonary actinomycosis (H)      Seasonal allergies        Past Surgical History:   Procedure Laterality Date     HYSTERECTOMY, PAP NO LONGER INDICATED       SURGICAL HISTORY OF -       thoracic spine       Family History   Problem Relation Age of Onset     Arthritis Mother      Prostate Cancer Father      Hypertension Father      Cancer Father         bone     Diabetes Maternal Grandfather      Thyroid Disease Sister        Social History     Tobacco Use     Smoking status: Former Smoker     Types: Cigarettes     Last attempt to quit: 2005     Years since quittin.7     Smokeless tobacco: Never Used     Tobacco comment: no 2nd hand smoke exposure   Substance Use Topics     Alcohol use: Yes     Comment: occ.         Exam:    Vitals: /77   Pulse 64   Wt 103.9 kg (229 lb)   BMI 38.11 kg/m    BMI: Body mass index is 38.11 kg/m .  Height: Data Unavailable    Constitutional/ general:  Pt is in no apparent distress, appears well-nourished.  Cooperative with  history and physical exam.     Psych:  The patient answered questions appropriately.  Normal affect.  Seems to have reasonable expectations, in terms of treatment.     Eyes:  Visual scanning/ tracking without deficit.     Ears:  Response to auditory stimuli is normal.  negative hearing aid devices.  Auricles in proper alignment.     Lymphatic:  Popliteal lymph nodes not enlarged.     Lungs:  Non labored breathing, non labored speech. No cough.  No audible wheezing. Even, quiet breathing.       Vascular:  positive pedal pulses bilaterally for both the DP and PT arteries.  CFT < 3 sec.  positive ankle edema.  positive pedal hair growth.    Neuro:  Alert and oriented x 3. Coordinated gait.  Light touch sensation is intact to the L4, L5, S1 distributions. No obvious deficits.  No evidence of neurological-based weakness, spasticity, or contracture in the lower extremities.     Derm: Normal texture and turgor.  No erythema, ecchymosis, or cyanosis.      Musculoskeletal:    Lower extremity muscle strength is normal.  Patient is ambulatory without an assistive device or brace.  No gross deformities.  Normal arch.       Decent muscle strength and ROM to all areas tested.,  Pain upon palpation to base of right 5th metatarsal.  Slight edema noted, but decreased since last visit.  No echymosis noted.  No erythema.  No sign of ulceration, infection, or drainage.     Radiographic Exam:  X-Ray Findings:  I personally reviewed the films.  Nondisplaced avulsion fracture at the base of the fifth metatarsal.  Some consolidation noted compared to previous x-ray.  Yet to have complete healing    Assessment:  Fifth metatarsal styloid process avulsion fracture left foot     Plan:  X-rays taken today of left foot.  Discussed patient that clinically she is much better.  Discussed radiographs show decreased bone conspicuity across the fracture site.  She will only do gentle walking for the next month.  If no pain or swelling  For now she  will slowly increase her activities.  She has any lateral instability discussed physical therapy can help this and she can call if she would like to start this.  Warned her not to reinjure this.  RTC PRN      Nick Bryson DPM DPM, FACFAS      Again, thank you for allowing me to participate in the care of your patient.        Sincerely,        Nick Bryson DPM

## 2020-08-19 NOTE — PATIENT INSTRUCTIONS
We wish you continued good healing. If you have any questions or concerns, please do not hesitate to contact us at [phone]    Please remember to call and schedule a follow up appointment if one was recommended at your earliest convenience.   PODIATRY CLINIC HOURS  TELEPHONE NUMBER    Dr. Nick Bryson D.P.M [facility]    Clinics:  [facility]    [facility] Goodwin [facility]   Tuesday 1PM-6PM  [place]  Wednesday 7AM-2PM  [facility]  Thursday 10AM-6PM  [place]  Friday 7AM-3PM  [place]  Specialty schedulers:   [phone] to make an appointment with any Specialty Provider.        Urgent Care locations:    [facility] Monday-Friday 5 pm - 9 pm. Saturday-Sunday 9 am -5pm    Monday-Friday 11 am - 9 pm Saturday 9 am - 5 pm     Monday-Sunday 12 noon-8PM [phone]     If you need a medication refill, please contact us you may need lab work and/or a follow up visit prior to your refill (i.e. Antifungal medications).    [employer] (secure e-mail communication and access to your chart) to send a message or to make an appointment.    If MRI needed please call [facility] Roland at [phone]

## 2021-02-02 ENCOUNTER — DOCUMENTATION ONLY (OUTPATIENT)
Dept: LAB | Facility: CLINIC | Age: 66
End: 2021-02-02

## 2021-02-02 DIAGNOSIS — I10 ESSENTIAL HYPERTENSION WITH GOAL BLOOD PRESSURE LESS THAN 140/90: ICD-10-CM

## 2021-02-02 DIAGNOSIS — E78.5 HYPERLIPIDEMIA LDL GOAL <130: Primary | ICD-10-CM

## 2021-02-02 DIAGNOSIS — E03.9 HYPOTHYROIDISM, UNSPECIFIED TYPE: ICD-10-CM

## 2021-02-02 DIAGNOSIS — E03.9 HYPOTHYROIDISM: ICD-10-CM

## 2021-02-04 DIAGNOSIS — E78.5 HYPERLIPIDEMIA LDL GOAL <130: ICD-10-CM

## 2021-02-04 DIAGNOSIS — E03.9 HYPOTHYROIDISM, UNSPECIFIED TYPE: ICD-10-CM

## 2021-02-04 DIAGNOSIS — I10 ESSENTIAL HYPERTENSION WITH GOAL BLOOD PRESSURE LESS THAN 140/90: ICD-10-CM

## 2021-02-04 LAB
ALBUMIN SERPL-MCNC: 3.6 G/DL (ref 3.4–5)
ALP SERPL-CCNC: 92 U/L (ref 40–150)
ALT SERPL W P-5'-P-CCNC: 21 U/L (ref 0–50)
ANION GAP SERPL CALCULATED.3IONS-SCNC: 4 MMOL/L (ref 3–14)
AST SERPL W P-5'-P-CCNC: 19 U/L (ref 0–45)
BILIRUB SERPL-MCNC: 0.6 MG/DL (ref 0.2–1.3)
BUN SERPL-MCNC: 14 MG/DL (ref 7–30)
CALCIUM SERPL-MCNC: 9.2 MG/DL (ref 8.5–10.1)
CHLORIDE SERPL-SCNC: 104 MMOL/L (ref 94–109)
CHOLEST SERPL-MCNC: 212 MG/DL
CO2 SERPL-SCNC: 31 MMOL/L (ref 20–32)
CREAT SERPL-MCNC: 0.82 MG/DL (ref 0.52–1.04)
GFR SERPL CREATININE-BSD FRML MDRD: 74 ML/MIN/{1.73_M2}
GLUCOSE SERPL-MCNC: 97 MG/DL (ref 70–99)
HDLC SERPL-MCNC: 69 MG/DL
LDLC SERPL CALC-MCNC: 90 MG/DL
NONHDLC SERPL-MCNC: 143 MG/DL
POTASSIUM SERPL-SCNC: 4.1 MMOL/L (ref 3.4–5.3)
PROT SERPL-MCNC: 7.7 G/DL (ref 6.8–8.8)
SODIUM SERPL-SCNC: 139 MMOL/L (ref 133–144)
TRIGL SERPL-MCNC: 265 MG/DL
TSH SERPL DL<=0.005 MIU/L-ACNC: 1.66 MU/L (ref 0.4–4)

## 2021-02-04 PROCEDURE — 80061 LIPID PANEL: CPT | Performed by: PHYSICIAN ASSISTANT

## 2021-02-04 PROCEDURE — 36415 COLL VENOUS BLD VENIPUNCTURE: CPT | Performed by: PHYSICIAN ASSISTANT

## 2021-02-04 PROCEDURE — 84443 ASSAY THYROID STIM HORMONE: CPT | Performed by: PHYSICIAN ASSISTANT

## 2021-02-04 PROCEDURE — 80053 COMPREHEN METABOLIC PANEL: CPT | Performed by: PHYSICIAN ASSISTANT

## 2021-02-04 NOTE — LETTER
February 9, 2021      Migdalia Escalera  [address] [place] [id number]        Dear ,    We are writing to inform you of your test results.      If you have any questions or concerns, please call the clinic at the number listed above.       Sincerely,      Kristen M Kehr, PA-C/jeramie          Resulted Orders   **Comprehensive metabolic panel FUTURE anytime   Result Value Ref Range    Sodium 139 133 - 144 mmol/L    Potassium 4.1 3.4 - 5.3 mmol/L    Chloride 104 94 - 109 mmol/L    Carbon Dioxide 31 20 - 32 mmol/L    Anion Gap 4 3 - 14 mmol/L    Glucose 97 70 - 99 mg/dL      Comment:      Fasting specimen    Urea Nitrogen 14 7 - 30 mg/dL    Creatinine 0.82 0.52 - 1.04 mg/dL    GFR Estimate 74 >60 mL/min/[1.73_m2]      Comment:      Non  GFR Calc  Starting 12/18/2018, serum creatinine based estimated GFR (eGFR) will be   calculated using the Chronic Kidney Disease Epidemiology Collaboration   (CKD-EPI) equation.      GFR Estimate If Black 86 >60 mL/min/[1.73_m2]      Comment:       GFR Calc  Starting 12/18/2018, serum creatinine based estimated GFR (eGFR) will be   calculated using the Chronic Kidney Disease Epidemiology Collaboration   (CKD-EPI) equation.      Calcium 9.2 8.5 - 10.1 mg/dL    Bilirubin Total 0.6 0.2 - 1.3 mg/dL    Albumin 3.6 3.4 - 5.0 g/dL    Protein Total 7.7 6.8 - 8.8 g/dL    Alkaline Phosphatase 92 40 - 150 U/L    ALT 21 0 - 50 U/L    AST 19 0 - 45 U/L   **TSH with free T4 reflex FUTURE anytime   Result Value Ref Range    TSH 1.66 0.40 - 4.00 mU/L   Lipid panel reflex to direct LDL Fasting   Result Value Ref Range    Cholesterol 212 (H) <200 mg/dL      Comment:      Desirable:       <200 mg/dl    Triglycerides 265 (H) <150 mg/dL      Comment:      Borderline high:  150-199 mg/dl  High:             200-499 mg/dl  Very high:       >499 mg/dl  Fasting specimen      HDL Cholesterol 69 >49 mg/dL    LDL Cholesterol Calculated 90 <100 mg/dL      Comment:       Desirable:       <100 mg/dl    Non HDL Cholesterol 143 (H) <130 mg/dL      Comment:      Above Desirable:  130-159 mg/dl  Borderline high:  160-189 mg/dl  High:             190-219 mg/dl  Very high:       >219 mg/dl

## 2021-02-08 NOTE — RESULT ENCOUNTER NOTE
Please send a letter with results. (we will also discuss them at her appointment next week).   Kristen Kehr PA-C

## 2021-02-09 ENCOUNTER — OFFICE VISIT (OUTPATIENT)
Dept: FAMILY MEDICINE | Facility: CLINIC | Age: 66
End: 2021-02-09
Payer: COMMERCIAL

## 2021-02-09 VITALS
WEIGHT: 232 LBS | OXYGEN SATURATION: 97 % | HEIGHT: 66 IN | HEART RATE: 70 BPM | DIASTOLIC BLOOD PRESSURE: 82 MMHG | SYSTOLIC BLOOD PRESSURE: 130 MMHG | BODY MASS INDEX: 37.28 KG/M2

## 2021-02-09 DIAGNOSIS — I10 ESSENTIAL HYPERTENSION WITH GOAL BLOOD PRESSURE LESS THAN 140/90: Primary | ICD-10-CM

## 2021-02-09 DIAGNOSIS — E66.01 MORBID OBESITY (H): ICD-10-CM

## 2021-02-09 DIAGNOSIS — E03.8 OTHER SPECIFIED HYPOTHYROIDISM: ICD-10-CM

## 2021-02-09 DIAGNOSIS — Z12.31 VISIT FOR SCREENING MAMMOGRAM: ICD-10-CM

## 2021-02-09 DIAGNOSIS — E78.5 HYPERLIPIDEMIA LDL GOAL <130: ICD-10-CM

## 2021-02-09 DIAGNOSIS — Z12.11 SPECIAL SCREENING FOR MALIGNANT NEOPLASMS, COLON: ICD-10-CM

## 2021-02-09 PROCEDURE — 99214 OFFICE O/P EST MOD 30 MIN: CPT | Performed by: PHYSICIAN ASSISTANT

## 2021-02-09 RX ORDER — SIMVASTATIN 40 MG
40 TABLET ORAL AT BEDTIME
Qty: 90 TABLET | Refills: 1 | Status: SHIPPED | OUTPATIENT
Start: 2021-02-09 | End: 2021-09-20

## 2021-02-09 RX ORDER — LOSARTAN POTASSIUM 100 MG/1
100 TABLET ORAL DAILY
Qty: 90 TABLET | Refills: 1 | Status: SHIPPED | OUTPATIENT
Start: 2021-02-09 | End: 2021-08-12

## 2021-02-09 RX ORDER — AMLODIPINE BESYLATE 5 MG/1
5 TABLET ORAL DAILY
Qty: 90 TABLET | Refills: 1 | Status: SHIPPED | OUTPATIENT
Start: 2021-02-09 | End: 2021-08-12

## 2021-02-09 RX ORDER — METOPROLOL SUCCINATE 100 MG/1
100 TABLET, EXTENDED RELEASE ORAL DAILY
Qty: 90 TABLET | Refills: 1 | Status: SHIPPED | OUTPATIENT
Start: 2021-02-09 | End: 2021-08-12

## 2021-02-09 RX ORDER — LEVOTHYROXINE SODIUM 125 UG/1
125 TABLET ORAL DAILY
Qty: 90 TABLET | Refills: 3 | Status: SHIPPED | OUTPATIENT
Start: 2021-02-09 | End: 2022-01-05

## 2021-02-09 RX ORDER — HYDROCHLOROTHIAZIDE 25 MG/1
25 TABLET ORAL DAILY
Qty: 90 TABLET | Refills: 1 | Status: SHIPPED | OUTPATIENT
Start: 2021-02-09 | End: 2021-08-24

## 2021-02-09 ASSESSMENT — MIFFLIN-ST. JEOR: SCORE: 1596.16

## 2021-02-09 NOTE — PROGRESS NOTES
{PROVIDER CHARTING PREFERENCE:869555}    Subjective     Migdalia is a 65 year old who presents to clinic today for the following health issues {ACCOMPANIED BY STATEMENT (Optional):632819}    History of Present Illness       Hypertension: She presents for follow up of hypertension.  She does not check blood pressure  regularly outside of the clinic. Outside blood pressures have been over 140/90. She does not follow a low salt diet.           Hypertension Follow-up      Do you check your blood pressure regularly outside of the clinic? No     Are you following a low salt diet? No    Are your blood pressures ever more than 140 on the top number (systolic) OR more   than 90 on the bottom number (diastolic), for example 140/90? No      How many servings of fruits and vegetables do you eat daily?  2-3    On average, how many sweetened beverages do you drink each day (Examples: soda, juice, sweet tea, etc.  Do NOT count diet or artificially sweetened beverages)?   1    How many days per week do you exercise enough to make your heart beat faster? 3 or less    How many minutes a day do you exercise enough to make your heart beat faster? 9 or less    How many days per week do you miss taking your medication? 0    {additonal problems for provider to add (Optional):370736}    Review of Systems   {ROS COMP (Optional):487421}      Objective    There were no vitals taken for this visit.  There is no height or weight on file to calculate BMI.  Physical Exam   {Exam List (Optional):873170}    {Diagnostic Test Results (Optional):730239}    {AMBULATORY ATTESTATION (Optional):019193}

## 2021-02-09 NOTE — PROGRESS NOTES
"  Assessment & Plan     Essential hypertension with goal blood pressure less than 140/90  Stable, refills given x 6 months.   Kidney function improved from her previous test.   - amLODIPine (NORVASC) 5 MG tablet; Take 1 tablet (5 mg) by mouth daily  - hydrochlorothiazide (HYDRODIURIL) 25 MG tablet; Take 1 tablet (25 mg) by mouth daily  - losartan (COZAAR) 100 MG tablet; Take 1 tablet (100 mg) by mouth daily  - metoprolol succinate ER (TOPROL-XL) 100 MG 24 hr tablet; Take 1 tablet (100 mg) by mouth daily    Other specified hypothyroidism  Stable, refills given   - levothyroxine (SYNTHROID/LEVOTHROID) 125 MCG tablet; Take 1 tablet (125 mcg) by mouth daily    Morbid obesity (H)  Encouraged healthy lifestyle habits.     Hyperlipidemia LDL goal <130  Cholesterol increased slightly. She will work on lifestyle changes.   - simvastatin (ZOCOR) 40 MG tablet; Take 1 tablet (40 mg) by mouth At Bedtime    Special screening for malignant neoplasms, colon  Due for colonoscopy this year.   - GASTROENTEROLOGY ADULT REF PROCEDURE ONLY; Future    Visit for screening mammogram  - *MA Screening Digital Bilateral; Future     BMI:   Estimated body mass index is 38.02 kg/m  as calculated from the following:    Height as of this encounter: 1.664 m (5' 5.5\").    Weight as of this encounter: 105.2 kg (232 lb).   Weight management plan: Discussed healthy diet and exercise guidelines          Return in about 1 year (around 2/9/2022) for medication check.    Kristen M. Kehr, PA-C [facility]     Migdalia is a 65 year old who presents to clinic today for the following health issues     History of Present Illness       Hypertension: She presents for follow up of hypertension.  She does not check blood pressure  regularly outside of the clinic. Outside blood pressures have been over 140/90. She does not follow a low salt diet.       She will need refills of her medications:  1. Hypertension: managed with " "metoprolol, amlodipine, hydrochlorothiazide, and losartan  2. Hyperlipidemia: managed with simvastatin  3. Hypothyroid: levothyroxine 125 mcg       Hypertension Follow-up      Do you check your blood pressure regularly outside of the clinic? No     Are you following a low salt diet? No    Are your blood pressures ever more than 140 on the top number (systolic) OR more   than 90 on the bottom number (diastolic), for example 140/90? No      How many servings of fruits and vegetables do you eat daily?  2-3    On average, how many sweetened beverages do you drink each day (Examples: soda, juice, sweet tea, etc.  Do NOT count diet or artificially sweetened beverages)?   0    How many days per week do you exercise enough to make your heart beat faster? 3 or less    How many minutes a day do you exercise enough to make your heart beat faster? 9 or less    How many days per week do you miss taking your medication? 0      Review of Systems   Constitutional, HEENT, cardiovascular, pulmonary, GI, , musculoskeletal, neuro, skin, endocrine and psych systems are negative, except as otherwise noted.      Objective    /82   Pulse 70   Ht 1.664 m (5' 5.5\")   Wt 105.2 kg (232 lb)   SpO2 97%   BMI 38.02 kg/m    Body mass index is 38.02 kg/m .  Physical Exam   GENERAL: healthy, alert and no distress  RESP: lungs clear to auscultation - no rales, rhonchi or wheezes  CV: regular rate and rhythm, normal S1 S2, no S3 or S4, no murmur, click or rub, no peripheral edema and peripheral pulses strong  MS: no gross musculoskeletal defects noted, no edema  SKIN: no suspicious lesions or rashes  PSYCH: mentation appears normal, affect normal/bright    Orders Only on 02/04/2021   Component Date Value Ref Range Status     Sodium 02/04/2021 139  133 - 144 mmol/L Final     Potassium 02/04/2021 4.1  3.4 - 5.3 mmol/L Final     Chloride 02/04/2021 104  94 - 109 mmol/L Final     Carbon Dioxide 02/04/2021 31  20 - 32 mmol/L Final     Anion " Gap 02/04/2021 4  3 - 14 mmol/L Final     Glucose 02/04/2021 97  70 - 99 mg/dL Final    Fasting specimen     Urea Nitrogen 02/04/2021 14  7 - 30 mg/dL Final     Creatinine 02/04/2021 0.82  0.52 - 1.04 mg/dL Final     GFR Estimate 02/04/2021 74  >60 mL/min/[1.73_m2] Final    Comment: Non  GFR Calc  Starting 12/18/2018, serum creatinine based estimated GFR (eGFR) will be   calculated using the Chronic Kidney Disease Epidemiology Collaboration   (CKD-EPI) equation.       GFR Estimate If Black 02/04/2021 86  >60 mL/min/[1.73_m2] Final    Comment:  GFR Calc  Starting 12/18/2018, serum creatinine based estimated GFR (eGFR) will be   calculated using the Chronic Kidney Disease Epidemiology Collaboration   (CKD-EPI) equation.       Calcium 02/04/2021 9.2  8.5 - 10.1 mg/dL Final     Bilirubin Total 02/04/2021 0.6  0.2 - 1.3 mg/dL Final     Albumin 02/04/2021 3.6  3.4 - 5.0 g/dL Final     Protein Total 02/04/2021 7.7  6.8 - 8.8 g/dL Final     Alkaline Phosphatase 02/04/2021 92  40 - 150 U/L Final     ALT 02/04/2021 21  0 - 50 U/L Final     AST 02/04/2021 19  0 - 45 U/L Final     TSH 02/04/2021 1.66  0.40 - 4.00 mU/L Final     Cholesterol 02/04/2021 212* <200 mg/dL Final    Desirable:       <200 mg/dl     Triglycerides 02/04/2021 265* <150 mg/dL Final    Comment: Borderline high:  150-199 mg/dl  High:             200-499 mg/dl  Very high:       >499 mg/dl  Fasting specimen       HDL Cholesterol 02/04/2021 69  >49 mg/dL Final     LDL Cholesterol Calculated 02/04/2021 90  <100 mg/dL Final    Desirable:       <100 mg/dl     Non HDL Cholesterol 02/04/2021 143* <130 mg/dL Final    Comment: Above Desirable:  130-159 mg/dl  Borderline high:  160-189 mg/dl  High:             190-219 mg/dl  Very high:       >219 mg/dl

## 2021-04-16 ENCOUNTER — ANCILLARY PROCEDURE (OUTPATIENT)
Dept: MAMMOGRAPHY | Facility: CLINIC | Age: 66
End: 2021-04-16
Attending: PHYSICIAN ASSISTANT
Payer: COMMERCIAL

## 2021-04-16 DIAGNOSIS — Z12.31 VISIT FOR SCREENING MAMMOGRAM: ICD-10-CM

## 2021-04-16 PROCEDURE — 77063 BREAST TOMOSYNTHESIS BI: CPT | Mod: TC | Performed by: RADIOLOGY

## 2021-04-16 PROCEDURE — 77067 SCR MAMMO BI INCL CAD: CPT | Mod: TC | Performed by: RADIOLOGY

## 2021-05-24 ENCOUNTER — TELEPHONE (OUTPATIENT)
Dept: FAMILY MEDICINE | Facility: CLINIC | Age: 66
End: 2021-05-24

## 2021-05-24 DIAGNOSIS — K64.4 EXTERNAL HEMORRHOIDS: Primary | ICD-10-CM

## 2021-05-25 NOTE — TELEPHONE ENCOUNTER
Left message on answering machine for patient/parent to call back.   [phone]  Alexia Aguirre RN

## 2021-05-25 NOTE — TELEPHONE ENCOUNTER
I was seeing patients until now. This information should have been routed to RN to call this morning. Sorry for the delay, but can nursing please contact this patient to discuss.   I am happy to see her, but if she is needing a referral for colorectal for possible surgery, please let me know, I am happy to place the appropriate referral also.   Kristen Kehr PA-C

## 2021-05-25 NOTE — TELEPHONE ENCOUNTER
Left a message to return a call to [phone].  Marina Gutierrez R.N.    RN:  Whomever speaks with Migdalia please clarify what the original message was trying to obtain or what she needs support with. Thank you. Marina Gutierrez R.N.

## 2021-05-25 NOTE — TELEPHONE ENCOUNTER
Patient is calling back and describing situation.   The last time she was in, she did have hemorrhoids that bled occasionally.  Now it is becoming more frequent, althought not all the time.      She is wondering what the next steps should be, see PCP or referral to GI?    She is requesting to leave a voicemail with details, as she leaves for work at 8:15.     Shyanne Love RN on 5/25/2021 at 7:03 PM

## 2021-05-26 NOTE — TELEPHONE ENCOUNTER
Called and spoke to patient's , I gave him the referral message. Patient to him that we will be calling with referral information.Amy Recinos MA/SERGIO

## 2021-05-26 NOTE — TELEPHONE ENCOUNTER
I have put in the referral for Colorectal Surgery.     She should check her insurance for coverage. She can see [facility] in [place] or if she needs to stay within the [employer] system then the [facility] contact information is attached.         Your provider has referred you to:     1. [facility]: [facility] [phone]   [email or web address]    2. [facility]: [facility] [place] [phone]   [email or web address]    Kristen Kehr PA-C

## 2021-05-26 NOTE — TELEPHONE ENCOUNTER
TC can inform patient of referral information.   Routing to TC basket.     Alyssa Coleman BSN, RN

## 2021-05-26 NOTE — TELEPHONE ENCOUNTER
? Referral for procedure or consult.   Patient has gone to [facility] previously.  Last colonoscopy 6/30/16.  Alexia Aguirre RN

## 2021-06-07 DIAGNOSIS — Z11.59 ENCOUNTER FOR SCREENING FOR OTHER VIRAL DISEASES: ICD-10-CM

## 2021-07-09 RX ORDER — BISACODYL 5 MG/1
15 TABLET, DELAYED RELEASE ORAL SEE ADMIN INSTRUCTIONS
Qty: 3 TABLET | Refills: 0 | Status: SHIPPED | OUTPATIENT
Start: 2021-07-09 | End: 2022-01-05

## 2021-07-09 RX ORDER — SODIUM, POTASSIUM,MAG SULFATES 17.5-3.13G
2 SOLUTION, RECONSTITUTED, ORAL ORAL SEE ADMIN INSTRUCTIONS
Qty: 354 ML | Refills: 0 | Status: SHIPPED | OUTPATIENT
Start: 2021-07-09 | End: 2022-01-05

## 2021-07-09 ASSESSMENT — MIFFLIN-ST. JEOR: SCORE: 1582.88

## 2021-07-13 ENCOUNTER — LAB (OUTPATIENT)
Dept: LAB | Facility: CLINIC | Age: 66
End: 2021-07-13
Payer: COMMERCIAL

## 2021-07-13 DIAGNOSIS — Z11.59 ENCOUNTER FOR SCREENING FOR OTHER VIRAL DISEASES: ICD-10-CM

## 2021-07-13 PROCEDURE — U0003 INFECTIOUS AGENT DETECTION BY NUCLEIC ACID (DNA OR RNA); SEVERE ACUTE RESPIRATORY SYNDROME CORONAVIRUS 2 (SARS-COV-2) (CORONAVIRUS DISEASE [COVID-19]), AMPLIFIED PROBE TECHNIQUE, MAKING USE OF HIGH THROUGHPUT TECHNOLOGIES AS DESCRIBED BY CMS-2020-01-R: HCPCS

## 2021-07-13 PROCEDURE — U0005 INFEC AGEN DETEC AMPLI PROBE: HCPCS

## 2021-07-14 LAB — SARS-COV-2 RNA RESP QL NAA+PROBE: NEGATIVE

## 2021-07-15 RX ORDER — NALOXONE HYDROCHLORIDE 0.4 MG/ML
0.2 INJECTION, SOLUTION INTRAMUSCULAR; INTRAVENOUS; SUBCUTANEOUS
Status: CANCELLED | OUTPATIENT
Start: 2021-07-15

## 2021-07-15 RX ORDER — NALOXONE HYDROCHLORIDE 0.4 MG/ML
0.4 INJECTION, SOLUTION INTRAMUSCULAR; INTRAVENOUS; SUBCUTANEOUS
Status: CANCELLED | OUTPATIENT
Start: 2021-07-15

## 2021-07-15 RX ORDER — ONDANSETRON 2 MG/ML
4 INJECTION INTRAMUSCULAR; INTRAVENOUS EVERY 6 HOURS PRN
Status: CANCELLED | OUTPATIENT
Start: 2021-07-15

## 2021-07-15 RX ORDER — PROCHLORPERAZINE MALEATE 5 MG
5 TABLET ORAL EVERY 6 HOURS PRN
Status: CANCELLED | OUTPATIENT
Start: 2021-07-15

## 2021-07-15 RX ORDER — FLUMAZENIL 0.1 MG/ML
0.2 INJECTION, SOLUTION INTRAVENOUS
Status: CANCELLED | OUTPATIENT
Start: 2021-07-15 | End: 2021-07-16

## 2021-07-15 RX ORDER — ONDANSETRON 4 MG/1
4 TABLET, ORALLY DISINTEGRATING ORAL EVERY 6 HOURS PRN
Status: CANCELLED | OUTPATIENT
Start: 2021-07-15

## 2021-07-16 ENCOUNTER — HOSPITAL ENCOUNTER (OUTPATIENT)
Facility: AMBULATORY SURGERY CENTER | Age: 66
Discharge: HOME | End: 2021-07-16
Attending: INTERNAL MEDICINE | Admitting: INTERNAL MEDICINE
Payer: COMMERCIAL

## 2021-07-16 VITALS
WEIGHT: 230 LBS | HEIGHT: 66 IN | DIASTOLIC BLOOD PRESSURE: 58 MMHG | RESPIRATION RATE: 16 BRPM | TEMPERATURE: 98.2 F | HEART RATE: 65 BPM | OXYGEN SATURATION: 98 % | SYSTOLIC BLOOD PRESSURE: 122 MMHG | BODY MASS INDEX: 36.96 KG/M2

## 2021-07-16 DIAGNOSIS — Z12.11 SCREEN FOR COLON CANCER: Primary | ICD-10-CM

## 2021-07-16 LAB — COLONOSCOPY: NORMAL

## 2021-07-16 PROCEDURE — G8918 PT W/O PREOP ORDER IV AB PRO: HCPCS

## 2021-07-16 PROCEDURE — 45385 COLONOSCOPY W/LESION REMOVAL: CPT

## 2021-07-16 PROCEDURE — G8907 PT DOC NO EVENTS ON DISCHARG: HCPCS

## 2021-07-16 PROCEDURE — 88305 TISSUE EXAM BY PATHOLOGIST: CPT | Performed by: PATHOLOGY

## 2021-07-16 DEVICE — IMPLANTABLE DEVICE: Type: IMPLANTABLE DEVICE | Site: COLON | Status: FUNCTIONAL

## 2021-07-16 RX ORDER — ONDANSETRON 2 MG/ML
4 INJECTION INTRAMUSCULAR; INTRAVENOUS
Status: DISCONTINUED | OUTPATIENT
Start: 2021-07-16 | End: 2021-07-17 | Stop reason: HOSPADM

## 2021-07-16 RX ORDER — LIDOCAINE 40 MG/G
CREAM TOPICAL
Status: DISCONTINUED | OUTPATIENT
Start: 2021-07-16 | End: 2021-07-17 | Stop reason: HOSPADM

## 2021-07-16 RX ORDER — FENTANYL CITRATE 50 UG/ML
INJECTION, SOLUTION INTRAMUSCULAR; INTRAVENOUS PRN
Status: DISCONTINUED | OUTPATIENT
Start: 2021-07-16 | End: 2021-07-16 | Stop reason: HOSPADM

## 2021-07-20 LAB
PATH REPORT.COMMENTS IMP SPEC: NORMAL
PATH REPORT.COMMENTS IMP SPEC: NORMAL
PATH REPORT.FINAL DX SPEC: NORMAL
PATH REPORT.GROSS SPEC: NORMAL
PATH REPORT.MICROSCOPIC SPEC OTHER STN: NORMAL
PATH REPORT.RELEVANT HX SPEC: NORMAL
PHOTO IMAGE: NORMAL

## 2021-08-02 NOTE — TELEPHONE ENCOUNTER
Elvira Moreira (:  1980) is a 41 y.o. female,Established patient, here for evaluation of the following chief complaint(s): Allergies, Insomnia, and Anxiety         ASSESSMENT/PLAN:  1. Insomnia, unspecified type  -     ramelteon (ROZEREM) 8 MG tablet; Take 1 tablet by mouth nightly as needed for Sleep, Disp-30 tablet, R-3Normal  Work on sleep hygiene measures including regular sleep schedule, optimal sleep environment, and relaxing presleep rituals. Avoid daytime naps. Avoid caffeine after noon. Avoid excess alcohol. Avoid tobacco.  Recommend daily exercise. 2. Class 1 obesity due to excess calories with serious comorbidity and body mass index (BMI) of 31.0 to 31.9 in adult  -     Dulaglutide (TRULICITY) 3 GM/5.3SF SOPN; Inject 3 mg into the skin once a week, Disp-4 pen, R-5Normal  Increase dose from 1.5 mg to 3 mg.   3. Type 2 diabetes mellitus without complication, unspecified whether long term insulin use (HCC)  -     Dulaglutide (TRULICITY) 3 XV/2.9UE SOPN; Inject 3 mg into the skin once a week, Disp-4 pen, R-5Normal  This problem is well controlled. Pt should continue current medication at current dose. 4. Mild persistent asthma without complication  -     montelukast (SINGULAIR) 10 MG tablet; TAKE 1 TABLET BY MOUTH EVERY DAY EVERY NIGHT, Disp-90 tablet, R-1Normal  -     albuterol sulfate HFA (PROAIR HFA) 108 (90 Base) MCG/ACT inhaler; Inhale 2 puffs into the lungs every 6 hours as needed for Wheezing, Disp-3 Inhaler, R-3Normal  5. Anxiety  -     venlafaxine (EFFEXOR) 100 MG tablet; Take 1 tablet by mouth 2 times daily, Disp-90 tablet, R-3Normal  Increase Effexor per orders. Call or return to clinic prn if these symptoms worsen or fail to improve as anticipated. 6. Allergic rhinitis, unspecified seasonality, unspecified trigger  -     montelukast (SINGULAIR) 10 MG tablet; TAKE 1 TABLET BY MOUTH EVERY DAY EVERY NIGHT, Disp-90 tablet, R-1Normal      No follow-ups on file. Left message to call me back directly./Tina Swanson,      Musculoskeletal:   [] Normal gait with no signs of ataxia         [] Normal range of motion of neck        [] Abnormal -     Neurological:        [x] No Facial Asymmetry (Cranial nerve 7 motor function) (limited exam due to video visit)          [] No gaze palsy        [] Abnormal -          Skin:        [x] No significant exanthematous lesions or discoloration noted on facial skin         [] Abnormal -            Psychiatric:       [] Normal Affect [] Abnormal -        [] No Hallucinations        Elvira Moreira, was evaluated through a synchronous (real-time) audio-video encounter. The patient (or guardian if applicable) is aware that this is a billable service. Verbal consent to proceed has been obtained within the past 12 months. The visit was conducted pursuant to the emergency declaration under the [address] [place] [address] authority and the [employer] General Act. Patient identification was verified, and a caregiver was present when appropriate. The patient was located in a state where the provider was credentialed to provide care. An electronic signature was used to authenticate this note.     --Benjie Cobos MD

## 2021-08-12 ENCOUNTER — OFFICE VISIT (OUTPATIENT)
Dept: FAMILY MEDICINE | Facility: CLINIC | Age: 66
End: 2021-08-12
Payer: COMMERCIAL

## 2021-08-12 VITALS
TEMPERATURE: 97.8 F | SYSTOLIC BLOOD PRESSURE: 130 MMHG | HEART RATE: 67 BPM | OXYGEN SATURATION: 96 % | BODY MASS INDEX: 37.31 KG/M2 | WEIGHT: 228 LBS | DIASTOLIC BLOOD PRESSURE: 72 MMHG

## 2021-08-12 DIAGNOSIS — I10 ESSENTIAL HYPERTENSION WITH GOAL BLOOD PRESSURE LESS THAN 140/90: ICD-10-CM

## 2021-08-12 PROCEDURE — 99213 OFFICE O/P EST LOW 20 MIN: CPT | Performed by: PHYSICIAN ASSISTANT

## 2021-08-12 RX ORDER — METOPROLOL SUCCINATE 100 MG/1
100 TABLET, EXTENDED RELEASE ORAL DAILY
Qty: 90 TABLET | Refills: 1 | Status: SHIPPED | OUTPATIENT
Start: 2021-08-12 | End: 2022-01-05

## 2021-08-12 RX ORDER — AMLODIPINE BESYLATE 5 MG/1
5 TABLET ORAL DAILY
Qty: 90 TABLET | Refills: 1 | Status: SHIPPED | OUTPATIENT
Start: 2021-08-12 | End: 2022-01-05

## 2021-08-12 RX ORDER — LOSARTAN POTASSIUM 100 MG/1
100 TABLET ORAL DAILY
Qty: 90 TABLET | Refills: 1 | Status: SHIPPED | OUTPATIENT
Start: 2021-08-12 | End: 2022-01-05

## 2021-08-12 ASSESSMENT — PAIN SCALES - GENERAL: PAINLEVEL: NO PAIN (0)

## 2021-08-12 NOTE — PROGRESS NOTES
"    Assessment & Plan     Essential hypertension with goal blood pressure less than 140/90  Stable, refills given x 6 months.   She plans to have her fasting lab tests completed again in 6 months.   She also had questions about medicare transition and Welcome to Medicare physical. She can combine this with her next appointment if she would like. She may be making the transition to Medicare by that time.   - losartan (COZAAR) 100 MG tablet; Take 1 tablet (100 mg) by mouth daily  - amLODIPine (NORVASC) 5 MG tablet; Take 1 tablet (5 mg) by mouth daily  - metoprolol succinate ER (TOPROL-XL) 100 MG 24 hr tablet; Take 1 tablet (100 mg) by mouth daily             BMI:   Estimated body mass index is 37.31 kg/m  as calculated from the following:    Height as of 7/9/21: 1.665 m (5' 5.55\").    Weight as of this encounter: 103.4 kg (228 lb).   Weight management plan: Discussed healthy diet and exercise guidelines        Return in about 6 months (around 2/12/2022) for Routine Visit.    Kristen M. Kehr, PA-C [facility]   Migdalia is a 66 year old who presents for the following health issues     History of Present Illness       Hypertension: She presents for follow up of hypertension.  She does not check blood pressure  regularly outside of the clinic. Outside blood pressures have been over 140/90. She does not follow a low salt diet.               Review of Systems   Constitutional, HEENT, cardiovascular, pulmonary, GI, , musculoskeletal, neuro, skin, endocrine and psych systems are negative, except as otherwise noted.      Objective    /72   Pulse 67   Temp 97.8  F (36.6  C) (Tympanic)   Wt 103.4 kg (228 lb)   SpO2 96%   BMI 37.31 kg/m    Body mass index is 37.31 kg/m .  Physical Exam   GENERAL: healthy, alert and no distress  RESP: lungs clear to auscultation - no rales, rhonchi or wheezes  CV: regular rate and rhythm, normal S1 S2, no S3 or S4, no murmur, click or rub, no " peripheral edema and peripheral pulses strong  PSYCH: mentation appears normal, affect normal/bright

## 2021-08-12 NOTE — NURSING NOTE
"No chief complaint on file.      Initial /72   Pulse 67   Temp 97.8  F (36.6  C) (Tympanic)   Wt 103.4 kg (228 lb)   SpO2 96%   BMI 37.31 kg/m   Estimated body mass index is 37.31 kg/m  as calculated from the following:    Height as of 7/9/21: 1.665 m (5' 5.55\").    Weight as of this encounter: 103.4 kg (228 lb).  Medication Reconciliation: complete    HORTENCIA Garcia MA      "

## 2021-08-23 DIAGNOSIS — I10 ESSENTIAL HYPERTENSION WITH GOAL BLOOD PRESSURE LESS THAN 140/90: ICD-10-CM

## 2021-08-24 RX ORDER — HYDROCHLOROTHIAZIDE 25 MG/1
25 TABLET ORAL DAILY
Qty: 90 TABLET | Refills: 1 | Status: SHIPPED | OUTPATIENT
Start: 2021-08-24 | End: 2022-01-05

## 2021-09-20 DIAGNOSIS — E78.5 HYPERLIPIDEMIA LDL GOAL <130: ICD-10-CM

## 2021-09-20 RX ORDER — SIMVASTATIN 40 MG
40 TABLET ORAL AT BEDTIME
Qty: 90 TABLET | Refills: 1 | Status: SHIPPED | OUTPATIENT
Start: 2021-09-20 | End: 2022-01-05

## 2022-01-05 ENCOUNTER — OFFICE VISIT (OUTPATIENT)
Dept: FAMILY MEDICINE | Facility: CLINIC | Age: 67
End: 2022-01-05
Payer: COMMERCIAL

## 2022-01-05 ENCOUNTER — ANCILLARY PROCEDURE (OUTPATIENT)
Dept: GENERAL RADIOLOGY | Facility: CLINIC | Age: 67
End: 2022-01-05
Attending: PHYSICIAN ASSISTANT
Payer: COMMERCIAL

## 2022-01-05 VITALS
HEIGHT: 66 IN | TEMPERATURE: 98 F | SYSTOLIC BLOOD PRESSURE: 128 MMHG | HEART RATE: 73 BPM | DIASTOLIC BLOOD PRESSURE: 82 MMHG | OXYGEN SATURATION: 97 % | BODY MASS INDEX: 36.96 KG/M2 | WEIGHT: 230 LBS

## 2022-01-05 DIAGNOSIS — Z12.31 VISIT FOR SCREENING MAMMOGRAM: ICD-10-CM

## 2022-01-05 DIAGNOSIS — E03.8 OTHER SPECIFIED HYPOTHYROIDISM: ICD-10-CM

## 2022-01-05 DIAGNOSIS — M25.552 HIP PAIN, LEFT: ICD-10-CM

## 2022-01-05 DIAGNOSIS — B00.1 RECURRENT COLD SORES: ICD-10-CM

## 2022-01-05 DIAGNOSIS — E78.5 HYPERLIPIDEMIA LDL GOAL <130: ICD-10-CM

## 2022-01-05 DIAGNOSIS — E66.01 MORBID OBESITY (H): ICD-10-CM

## 2022-01-05 DIAGNOSIS — I10 ESSENTIAL HYPERTENSION WITH GOAL BLOOD PRESSURE LESS THAN 140/90: ICD-10-CM

## 2022-01-05 DIAGNOSIS — Z00.00 WELCOME TO MEDICARE PREVENTIVE VISIT: Primary | ICD-10-CM

## 2022-01-05 LAB
ALBUMIN SERPL-MCNC: 3.8 G/DL (ref 3.4–5)
ALP SERPL-CCNC: 80 U/L (ref 40–150)
ALT SERPL W P-5'-P-CCNC: 29 U/L (ref 0–50)
ANION GAP SERPL CALCULATED.3IONS-SCNC: 7 MMOL/L (ref 3–14)
AST SERPL W P-5'-P-CCNC: 21 U/L (ref 0–45)
BILIRUB SERPL-MCNC: 0.7 MG/DL (ref 0.2–1.3)
BUN SERPL-MCNC: 23 MG/DL (ref 7–30)
CALCIUM SERPL-MCNC: 9.1 MG/DL (ref 8.5–10.1)
CHLORIDE BLD-SCNC: 103 MMOL/L (ref 94–109)
CHOLEST SERPL-MCNC: 222 MG/DL
CO2 SERPL-SCNC: 29 MMOL/L (ref 20–32)
CREAT SERPL-MCNC: 1.01 MG/DL (ref 0.52–1.04)
FASTING STATUS PATIENT QL REPORTED: YES
GFR SERPL CREATININE-BSD FRML MDRD: 60 ML/MIN/1.73M2
GLUCOSE BLD-MCNC: 109 MG/DL (ref 70–99)
HDLC SERPL-MCNC: 81 MG/DL
LDLC SERPL CALC-MCNC: 107 MG/DL
NONHDLC SERPL-MCNC: 141 MG/DL
POTASSIUM BLD-SCNC: 4.1 MMOL/L (ref 3.4–5.3)
PROT SERPL-MCNC: 7.8 G/DL (ref 6.8–8.8)
SODIUM SERPL-SCNC: 139 MMOL/L (ref 133–144)
TRIGL SERPL-MCNC: 169 MG/DL
TSH SERPL DL<=0.005 MIU/L-ACNC: 1.49 MU/L (ref 0.4–4)

## 2022-01-05 PROCEDURE — G0402 INITIAL PREVENTIVE EXAM: HCPCS | Performed by: PHYSICIAN ASSISTANT

## 2022-01-05 PROCEDURE — 80061 LIPID PANEL: CPT | Performed by: PHYSICIAN ASSISTANT

## 2022-01-05 PROCEDURE — 99214 OFFICE O/P EST MOD 30 MIN: CPT | Mod: 25 | Performed by: PHYSICIAN ASSISTANT

## 2022-01-05 PROCEDURE — 36415 COLL VENOUS BLD VENIPUNCTURE: CPT | Performed by: PHYSICIAN ASSISTANT

## 2022-01-05 PROCEDURE — 73502 X-RAY EXAM HIP UNI 2-3 VIEWS: CPT | Mod: LT | Performed by: RADIOLOGY

## 2022-01-05 PROCEDURE — 80053 COMPREHEN METABOLIC PANEL: CPT | Performed by: PHYSICIAN ASSISTANT

## 2022-01-05 PROCEDURE — 84443 ASSAY THYROID STIM HORMONE: CPT | Performed by: PHYSICIAN ASSISTANT

## 2022-01-05 RX ORDER — VALACYCLOVIR HYDROCHLORIDE 1 G/1
TABLET, FILM COATED ORAL
Qty: 30 TABLET | Refills: 3 | Status: SHIPPED | OUTPATIENT
Start: 2022-01-05

## 2022-01-05 RX ORDER — LOSARTAN POTASSIUM 100 MG/1
100 TABLET ORAL DAILY
Qty: 90 TABLET | Refills: 1 | Status: SHIPPED | OUTPATIENT
Start: 2022-01-05 | End: 2022-07-07

## 2022-01-05 RX ORDER — LEVOTHYROXINE SODIUM 125 UG/1
125 TABLET ORAL DAILY
Qty: 90 TABLET | Refills: 3 | Status: SHIPPED | OUTPATIENT
Start: 2022-01-05 | End: 2023-01-05

## 2022-01-05 RX ORDER — AMLODIPINE BESYLATE 5 MG/1
5 TABLET ORAL DAILY
Qty: 90 TABLET | Refills: 1 | Status: SHIPPED | OUTPATIENT
Start: 2022-01-05 | End: 2022-07-07

## 2022-01-05 RX ORDER — HYDROCHLOROTHIAZIDE 25 MG/1
25 TABLET ORAL DAILY
Qty: 90 TABLET | Refills: 1 | Status: SHIPPED | OUTPATIENT
Start: 2022-01-05 | End: 2022-07-07

## 2022-01-05 RX ORDER — SIMVASTATIN 40 MG
40 TABLET ORAL AT BEDTIME
Qty: 90 TABLET | Refills: 1 | Status: SHIPPED | OUTPATIENT
Start: 2022-01-05 | End: 2022-07-07

## 2022-01-05 RX ORDER — METOPROLOL SUCCINATE 100 MG/1
100 TABLET, EXTENDED RELEASE ORAL DAILY
Qty: 90 TABLET | Refills: 1 | Status: SHIPPED | OUTPATIENT
Start: 2022-01-05 | End: 2022-07-07

## 2022-01-05 ASSESSMENT — ENCOUNTER SYMPTOMS
BREAST MASS: 0
COUGH: 1
SHORTNESS OF BREATH: 1
ARTHRALGIAS: 1
MYALGIAS: 1

## 2022-01-05 ASSESSMENT — MIFFLIN-ST. JEOR: SCORE: 1582.08

## 2022-01-05 ASSESSMENT — ACTIVITIES OF DAILY LIVING (ADL): CURRENT_FUNCTION: NO ASSISTANCE NEEDED

## 2022-01-05 ASSESSMENT — PAIN SCALES - GENERAL: PAINLEVEL: NO PAIN (0)

## 2022-01-05 NOTE — RESULT ENCOUNTER NOTE
Please send a letter with results and the following:      Adam Negron is a copy of your latest test results.     Your cholesterol panel is still above normal, but overall improved from last year.   Continue with your current medication.     Your thyroid test is normal.    Blood sugar is in the prediabetes range.     Continue all of your medications, continue to work on eating healthy and routine exercise.     I will see you back in 6 months.       If you have any questions or concerns, please contact the clinic at [phone].    Thank you,        Kristen Kehr PA-C

## 2022-01-05 NOTE — PROGRESS NOTES
"SUBJECTIVE:   Migdalia Escalera is a 66 year old female who presents for Preventive Visit.      Patient has been advised of split billing requirements and indicates understanding: Yes   Are you in the first 12 months of your Medicare coverage?  Yes,  Visual Acuity:  Right Eye: 20/30   Left Eye: 20/30  Both Eyes: 20/25    Healthy Habits:     In general, how would you rate your overall health?  Good    Frequency of exercise:  None    Do you usually eat at least 4 servings of fruit and vegetables a day, include whole grains    & fiber and avoid regularly eating high fat or \"junk\" foods?  Yes    Taking medications regularly:  Yes    Medication side effects:  None    Ability to successfully perform activities of daily living:  No assistance needed    Home Safety:  No safety concerns identified    Hearing Impairment:  Difficulty understanding soft or whispered speech    In the past 6 months, have you been bothered by leaking of urine?  No    In general, how would you rate your overall mental or emotional health?  Good      PHQ-2 Total Score: 0    Additional concerns today:  Yes    Do you feel safe in your environment? Yes    Have you ever done Advance Care Planning? (For example, a Health Directive, POLST, or a discussion with a medical provider or your loved ones about your wishes): No, advance care planning information given to patient to review.  Patient declined advance care planning discussion at this time.       Fall risk  Fallen 2 or more times in the past year?: No  Any fall with injury in the past year?: No    Cognitive Screening   1) Repeat 3 items (Leader, Season, Table)    2) Clock draw: NORMAL  3) 3 item recall: Recalls 3 objects  Results: 3 items recalled: COGNITIVE IMPAIRMENT LESS LIKELY    Mini-CogTM Copyright JOHNY Segura. Licensed by the author for use in [facility]; reprinted with permission (hetal@.[email or web address]). All rights reserved.          Reviewed and updated as needed this visit by clinical " staff  Tobacco  Allergies  Meds  Problems  Med Hx  Surg Hx  Fam Hx  Soc Hx         Reviewed and updated as needed this visit by Provider  Tobacco  Allergies  Meds  Problems  Med Hx  Surg Hx  Fam Hx        Social History     Tobacco Use     Smoking status: Former Smoker     Types: Cigarettes     Quit date: 2005     Years since quittin.1     Smokeless tobacco: Never Used     Tobacco comment: no 2nd hand smoke exposure   Substance Use Topics     Alcohol use: Yes     Comment: 2-3 beers every day and an additional glass of wine     If you drink alcohol do you typically have >3 drinks per day or >7 drinks per week? No    Alcohol Use 2022   Prescreen: >3 drinks/day or >7 drinks/week? Yes   Prescreen: >3 drinks/day or >7 drinks/week? -   AUDIT SCORE  7     AUDIT - Alcohol Use Disorders Identification Test - Reproduced from the World Health Organization Audit 2001 (Second Edition) 2022   1.  How often do you have a drink containing alcohol? 4 or more times a week   2.  How many drinks containing alcohol do you have on a typical day when you are drinking? 3 or 4   3.  How often do you have five or more drinks on one occasion? Never   4.  How often during the last year have you found that you were not able to stop drinking once you had started? Less than monthly   5.  How often during the last year have you failed to do what was normally expected of you because of drinking? Never   6.  How often during the last year have you needed a first drink in the morning to get yourself going after a heavy drinking session? Never   7.  How often during the last year have you had a feeling of guilt or remorse after drinking? Never   8.  How often during the last year have you been unable to remember what happened the night before because of your drinking? Less than monthly   9.  Have you or someone else been injured because of your drinking? No   10. Has a relative, friend, doctor or other health care  worker been concerned about your drinking or suggested you cut down? No   TOTAL SCORE 7           PROBLEMS TO ADD ON...  Hypertension: managed with toprol, losartan, hydrochlorothiazide and amlodipine  Hypothyroid: she is taking levothyroxine 125 mcg daily  Hyperlipidemia: managed with simvastatin 40 mg daily  Left hip pain: has been occurring for the past month. It is worse with activity. Pain is in the groin area, there is some increase in sensitivity and radiation of the pain in the thigh. She has multiple levels of stairs in her home and the pain is a factor with stairs. She feels that her lower extremity will give out if climbing the stairs alternating steps.   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    Current providers sharing in care for this patient include:   Patient Care Team:  Kehr, Kristen M, PA-C as PCP - General (Physician Assistant - Medical)  Kehr, Kristen M, PA-C as Assigned PCP  Nick Bryson DPM as Assigned Musculoskeletal Provider    The following health maintenance items are reviewed in Epic and correct as of today:  Health Maintenance Due   Topic Date Due     ANNUAL REVIEW OF HM ORDERS  Never done     COVID-19 Vaccine (1) Never done     INFLUENZA VACCINE (1) 09/01/2021     ZOSTER IMMUNIZATION (2 of 2) 04/06/2021     CMP  02/04/2022     LIPID  02/04/2022     TSH W/FREE T4 REFLEX  02/04/2022     BP Readings from Last 3 Encounters:   01/05/22 128/82   08/12/21 130/72   07/16/21 122/58    Wt Readings from Last 3 Encounters:   01/05/22 104.3 kg (230 lb)   08/12/21 103.4 kg (228 lb)   07/09/21 104.3 kg (230 lb)                  Patient Active Problem List   Diagnosis     Hyperlipidemia LDL goal <130     Advanced directives, counseling/discussion     H/O: hysterectomy     Vitamin D deficiency     Hypothyroidism, unspecified hypothyroidism type     History of colonic polyps     Essential hypertension with goal blood pressure less than 140/90     Breast hypertrophy     Obesity (BMI  35.0-39.9) with comorbidity (H)     Asymptomatic postmenopausal status     Hypothyroid     Shortness of breath     Esophageal reflux     Chest pain, unspecified     Past Surgical History:   Procedure Laterality Date     COLONOSCOPY       COLONOSCOPY WITH CO2 INSUFFLATION N/A 2021    Procedure: COLONOSCOPY, WITH CO2 INSUFFLATION;  Surgeon: Wild Westbrook MD;  Location: [facility] OR     HYSTERECTOMY, PAP NO LONGER INDICATED       SURGICAL HISTORY OF -       thoracic spine-growth       Social History     Tobacco Use     Smoking status: Former Smoker     Types: Cigarettes     Quit date: 2005     Years since quittin.1     Smokeless tobacco: Never Used     Tobacco comment: no 2nd hand smoke exposure   Substance Use Topics     Alcohol use: Yes     Comment: 2-3 beers every day and an additional glass of wine     Family History   Problem Relation Age of Onset     Arthritis Mother      Prostate Cancer Father      Hypertension Father      Cancer Father         bone     Diabetes Maternal Grandfather      Thyroid Disease Sister          Current Outpatient Medications   Medication Sig Dispense Refill     amLODIPine (NORVASC) 5 MG tablet Take 1 tablet (5 mg) by mouth daily 90 tablet 1     aspirin 81 MG tablet Take 1 tablet by mouth daily.       CALCIUM PO        hydrochlorothiazide (HYDRODIURIL) 25 MG tablet Take 1 tablet (25 mg) by mouth daily 90 tablet 1     levothyroxine (SYNTHROID/LEVOTHROID) 125 MCG tablet Take 1 tablet (125 mcg) by mouth daily 90 tablet 3     losartan (COZAAR) 100 MG tablet Take 1 tablet (100 mg) by mouth daily 90 tablet 1     metoprolol succinate ER (TOPROL-XL) 100 MG 24 hr tablet Take 1 tablet (100 mg) by mouth daily 90 tablet 1     order for DME Equipment being ordered: compression stockings / mild compression 1 Units 3     simvastatin (ZOCOR) 40 MG tablet Take 1 tablet (40 mg) by mouth At Bedtime 90 tablet 1     valACYclovir (VALTREX) 1000 mg tablet Take 2 tablets (2,000 mg) by mouth  "2 times daily 30 tablet 3     VENTOLIN  (90 Base) MCG/ACT inhaler INHALE 2 PUFFS BY INHALATION ROUTE EVERY 4 HOURS AS NEEDED FOR SHORTNESS OF BREATH / DYSPNEA OR WHEEZING. USE WITH SPACER. 18 g 0     Allergies   Allergen Reactions     Amoxicillin      Erythromycin      Recent Labs   Lab Test 02/04/21  0810 08/11/20  0812 02/06/20  0806 08/08/19  0806 04/26/19  0759 01/29/19  0845   LDL 90  --  79  --   --  101*   HDL 69  --  77  --   --  75   TRIG 265*  --  116  --   --  131   ALT 21 26  --  28  --   --    CR 0.82 1.13* 0.82 0.95  --  1.01   GFRESTIMATED 74 50* 74 62  --  58*   GFRESTBLACK 86 58* 86 72  --  67   POTASSIUM 4.1 4.0 4.1 4.3  --  4.1   TSH 1.66  --  0.44  --    < >  --     < > = values in this interval not displayed.          Breast CA Risk Assessment (FHS-7) 1/5/2022   Do you have a family history of breast, colon, or ovarian cancer? No / Unknown         Mammogram Screening: Recommended mammography every 1-2 years with patient discussion and risk factor consideration  Pertinent mammograms are reviewed under the imaging tab.    Review of Systems   Respiratory: Positive for cough and shortness of breath.    Cardiovascular: Positive for peripheral edema.   Breasts:  Negative for tenderness, breast mass and discharge.   Genitourinary: Negative for pelvic pain, vaginal bleeding and vaginal discharge.   Musculoskeletal: Positive for arthralgias and myalgias.     Constitutional, HEENT, cardiovascular, pulmonary, GI, , musculoskeletal, neuro, skin, endocrine and psych systems are negative, except as otherwise noted.    OBJECTIVE:   /82   Pulse 73   Temp 98  F (36.7  C) (Tympanic)   Ht 1.664 m (5' 5.5\")   Wt 104.3 kg (230 lb)   SpO2 97%   BMI 37.69 kg/m   Estimated body mass index is 37.69 kg/m  as calculated from the following:    Height as of this encounter: 1.664 m (5' 5.5\").    Weight as of this encounter: 104.3 kg (230 lb).  Physical Exam  GENERAL APPEARANCE: healthy, alert and no " distress  EYES: Eyes grossly normal to inspection, PERRL and conjunctivae and sclerae normal  HENT: ear canals and TM's normal, nose and mouth without ulcers or lesions, oropharynx clear and oral mucous membranes moist  NECK: no adenopathy, no asymmetry, masses, or scars and thyroid normal to palpation  RESP: lungs clear to auscultation - no rales, rhonchi or wheezes  BREAST: normal without masses, tenderness or nipple discharge and no palpable axillary masses or adenopathy  CV: regular rate and rhythm, normal S1 S2, no S3 or S4, no murmur, click or rub, no peripheral edema and peripheral pulses strong  ABDOMEN: soft, nontender, no hepatosplenomegaly, no masses and bowel sounds normal  MS: no musculoskeletal defects are noted and gait is age appropriate without ataxia. Normal ROM of the hip joint. Straight leg testing negative.   SKIN: no suspicious lesions or rashes  NEURO: Normal strength and tone, sensory exam grossly normal, mentation intact and speech normal  PSYCH: mentation appears normal and affect normal/bright    Diagnostic Test Results:  Labs reviewed in Epic    ASSESSMENT / PLAN:   (Z00.00) Welcome to Medicare preventive visit  (primary encounter diagnosis)  Comment: Health maintenance reviewed and updated.  Plan: declines all vaccinations    (I10) Essential hypertension with goal blood pressure less than 140/90  Comment: Improvement after she is here for awhile. She will get refills of all of her medications once all of the tests are back. She will continue to monitor. Plan follow up in 6 months here in the clinic.   Plan: COMPREHENSIVE METABOLIC PANEL            (E66.01) Morbid obesity (H)  Comment: work on lifestyle changes.     (E03.8) Other specified hypothyroidism  Comment: check thyroid levels today. Refill medication or adjust as needed.   Plan: TSH WITH FREE T4 REFLEX            (E78.5) Hyperlipidemia LDL goal <130  Comment: check lipids today.   Refill medications or adjust once test are back.  "  Plan: COMPREHENSIVE METABOLIC PANEL, Lipid panel         reflex to direct LDL Fasting            (B00.1) Recurrent cold sores  Comment: refill given      (M25.932) Hip pain, left  Comment: new concern.   Xray reviewed, mild OA, but Radiology will also review.   She is using naproxen as needed, but will also plan to schedule an appointment with Orthopedics to determine appropriate treatment plan or further work up needed.   Plan: XR Hip Left 2-3 Views, Orthopedic          Referral            (Z12.31) Visit for screening mammogram  Comment: scheduled in April / May   Plan: MA Screen Bilateral w/Sebastien              Patient has been advised of split billing requirements and indicates understanding: Yes  COUNSELING:  Reviewed preventive health counseling, as reflected in patient instructions       Regular exercise       Healthy diet/nutrition       Advanced Planning     Estimated body mass index is 37.69 kg/m  as calculated from the following:    Height as of this encounter: 1.664 m (5' 5.5\").    Weight as of this encounter: 104.3 kg (230 lb).    Weight management plan: Discussed healthy diet and exercise guidelines    She reports that she quit smoking about 16 years ago. Her smoking use included cigarettes. She has never used smokeless tobacco.      Appropriate preventive services were discussed with this patient, including applicable screening as appropriate for cardiovascular disease, diabetes, osteopenia/osteoporosis, and glaucoma.  As appropriate for age/gender, discussed screening for colorectal cancer, prostate cancer, breast cancer, and cervical cancer. Checklist reviewing preventive services available has been given to the patient.    Reviewed patients plan of care and provided an AVS. The Basic Care Plan (routine screening as documented in Health Maintenance) for Migdalia meets the Care Plan requirement. This Care Plan has been established and reviewed with the Patient.    Counseling Resources:  ATP IV " Guidelines  Pooled Cohorts Equation Calculator  Breast Cancer Risk Calculator  Breast Cancer: Medication to Reduce Risk  FRAX Risk Assessment  ICSI Preventive Guidelines  Dietary Guidelines for Americans, 2010  USDA's MyPlate  ASA Prophylaxis  Lung CA Screening    Kristen M. Kehr, PA-C [facility]    Identified Health Risks:

## 2022-01-05 NOTE — NURSING NOTE
"Chief Complaint   Patient presents with     Wellness Visit       Initial BP (!) 167/90   Pulse 73   Temp 98  F (36.7  C) (Tympanic)   Ht 1.664 m (5' 5.5\")   Wt 104.3 kg (230 lb)   SpO2 97%   BMI 37.69 kg/m   Estimated body mass index is 37.69 kg/m  as calculated from the following:    Height as of this encounter: 1.664 m (5' 5.5\").    Weight as of this encounter: 104.3 kg (230 lb).  Medication Reconciliation: complete    HORTENCIA Garcia MA    "

## 2022-01-05 NOTE — LETTER
January 5, 2022      Migdalia Escalera  [address] [place] [facility] [address]        Adam Negron is a copy of your latest test results.     Your cholesterol panel is still above normal, but overall improved from last year.   Continue with your current medication.     Your thyroid test is normal.     Blood sugar is in the prediabetes range.     Continue all of your medications, continue to work on eating healthy and routine exercise.     I will see you back in 6 months.       If you have any questions or concerns, please contact the clinic at [phone].     Thank you,         Kristen Kehr PA-C       Resulted Orders   TSH WITH FREE T4 REFLEX   Result Value Ref Range    TSH 1.49 0.40 - 4.00 mU/L   Lipid panel reflex to direct LDL Fasting   Result Value Ref Range    Cholesterol 222 (H) <200 mg/dL    Triglycerides 169 (H) <150 mg/dL    Direct Measure HDL 81 >=50 mg/dL    LDL Cholesterol Calculated 107 (H) <=100 mg/dL    Non HDL Cholesterol 141 (H) <130 mg/dL    Patient Fasting > 8hrs? Yes     Narrative    Cholesterol  Desirable:  <200 mg/dL    Triglycerides  Normal:  Less than 150 mg/dL  Borderline High:  150-199 mg/dL  High:  200-499 mg/dL  Very High:  Greater than or equal to 500 mg/dL    Direct Measure HDL  Female:  Greater than or equal to 50 mg/dL   Male:  Greater than or equal to 40 mg/dL    LDL Cholesterol  Desirable:  <100mg/dL  Above Desirable:  100-129 mg/dL   Borderline High:  130-159 mg/dL   High:  160-189 mg/dL   Very High:  >= 190 mg/dL    Non HDL Cholesterol  Desirable:  130 mg/dL  Above Desirable:  130-159 mg/dL  Borderline High:  160-189 mg/dL  High:  190-219 mg/dL  Very High:  Greater than or equal to 220 mg/dL   COMPREHENSIVE METABOLIC PANEL   Result Value Ref Range    Sodium 139 133 - 144 mmol/L    Potassium 4.1 3.4 - 5.3 mmol/L    Chloride 103 94 - 109 mmol/L    Carbon Dioxide (CO2) 29 20 - 32 mmol/L    Anion Gap 7 3 - 14 mmol/L    Urea Nitrogen 23 7 - 30 mg/dL    Creatinine 1.01 0.52 -  1.04 mg/dL    Calcium 9.1 8.5 - 10.1 mg/dL    Glucose 109 (H) 70 - 99 mg/dL    Alkaline Phosphatase 80 40 - 150 U/L    AST 21 0 - 45 U/L    ALT 29 0 - 50 U/L    Protein Total 7.8 6.8 - 8.8 g/dL    Albumin 3.8 3.4 - 5.0 g/dL    Bilirubin Total 0.7 0.2 - 1.3 mg/dL    GFR Estimate 60 (L) >60 mL/min/1.73m2      Comment:      Effective December 21, 2021 eGFRcr in adults is calculated using the 2021 CKD-EPI creatinine equation which includes age and gender (Sandy alcantara al., NEJM, DOI: 10.1056/AQMIal1861377)

## 2022-01-05 NOTE — PATIENT INSTRUCTIONS
Patient Education   Personalized Prevention Plan  You are due for the preventive services outlined below.  Your care team is available to assist you in scheduling these services.  If you have already completed any of these items, please share that information with your care team to update in your medical record.  Health Maintenance Due   Topic Date Due     ANNUAL REVIEW OF HM ORDERS  Never done     COVID-19 Vaccine (1) Never done     Annual Wellness Visit  02/13/2021     Flu Vaccine (1) 09/01/2021     PHQ-2  01/01/2022     Zoster (Shingles) Vaccine (2 of 2) 04/06/2021     Comprehensive Metabolic Panel  02/04/2022     Cholesterol Lab  02/04/2022     Thyroid Function Lab  02/04/2022

## 2022-01-11 ENCOUNTER — OFFICE VISIT (OUTPATIENT)
Dept: ORTHOPEDICS | Facility: CLINIC | Age: 67
End: 2022-01-11
Attending: PHYSICIAN ASSISTANT
Payer: COMMERCIAL

## 2022-01-11 VITALS
HEIGHT: 66 IN | WEIGHT: 230 LBS | SYSTOLIC BLOOD PRESSURE: 128 MMHG | DIASTOLIC BLOOD PRESSURE: 82 MMHG | BODY MASS INDEX: 36.96 KG/M2

## 2022-01-11 DIAGNOSIS — R20.0 NUMBNESS AND TINGLING OF LEFT LOWER EXTREMITY: Primary | ICD-10-CM

## 2022-01-11 DIAGNOSIS — M25.552 HIP PAIN, LEFT: ICD-10-CM

## 2022-01-11 DIAGNOSIS — R20.2 NUMBNESS AND TINGLING OF LEFT LOWER EXTREMITY: Primary | ICD-10-CM

## 2022-01-11 PROCEDURE — 99203 OFFICE O/P NEW LOW 30 MIN: CPT | Performed by: PEDIATRICS

## 2022-01-11 ASSESSMENT — MIFFLIN-ST. JEOR: SCORE: 1582.08

## 2022-01-11 NOTE — LETTER
1/11/2022         RE: Migdalia Escalera  [address] [facility] [address]        Dear Colleague,    Thank you for referring your patient, Migdalia Escalera, to the [facility]. Please see a copy of my visit note below.    ASSESSMENT & PLAN    Migdalia was seen today for pain.    Diagnoses and all orders for this visit:    Numbness and tingling of left lower extremity    Hip pain, left  -     Orthopedic  Referral            See Patient Instructions  Patient Instructions   Hip pain most suspected to be from some early degenerative change, arthritis; there is very minimal change on the x-rays noted, but that is so the primary suspicion.  Tingling into the left thigh favored to be from irritation of the lateral femoral cutaneous nerve, also called meralgia paresthetica.    For the left hip, we discussed exercise is okay.  Would offer physical therapy if interested.  May contact clinic referral if desired.  Discussed consideration of additional imaging with MRI, for better characterization of the joint.  If interested in this, can contact clinic.  Briefly also discussed injection therapy, for hip degenerative change.  This would be done with imaging guidance.  Defer for now, plan for primarily monitoring and activity modification.    With left thigh tingling, okay to monitor.  No other concerning findings on neurologic exam today.  There could be some benefits from rehab and therapy, also potentially from an injection if symptoms are persistent.  Otherwise, plan to monitor for now.    Options are as noted above.  Contact clinic if any questions or concerns, or if desiring to change course or pursue further intervention.  Otherwise, follow-up visit as needed.    If you have any further questions for your physician or physician s care team you can call [phone] and use option 3 to leave a voice message. Calls received during business hours will be returned same  "day.        Randy Santiago DO  [facility]      CC: Kristen M Kehr      -----  Chief Complaint   Patient presents with     Left Leg - Pain       SUBJECTIVE  Migdalia Escalera is a/an 66 year old female who is seen in consultation at the request of  Kristen M Kehr PA-C for evaluation of left leg pain.  Pain with standing.  Feels her leg goes numb and cold.  Difficulty getting up her stairs, feels like her hip is locking.  Pain over the lateral thigh and groin.    Denies any low back pain.      The patient is seen by themselves.    Onset: 1 years(s) ago. Reports insidious onset without acute precipitating event.  Location of Pain: left hip and thigh   Worsened by: standing, stairs   Better with: aleve   Treatments tried: Aleve  Associated symptoms: locking or catching    Orthopedic/Surgical history: HX of OA, with PT chart review shows it was for knees  Social History/Occupation: sell automotive parts     No family history pertinent to patient's problem today.   **  Favoring left LE with walking, WB activities.  Pain is sometimes left anterior hip, more often in the anterolateral thigh.  Tingling can radiate to level of left knee. Does not radiate proximal to hip.  Locking/catching sensation is anterior hip, intermittent.  No low back pain. No history of low back issues.    Previous notes related to this issue reviewed.      REVIEW OF SYSTEMS:  Review of Systems   All other systems reviewed and are negative.        OBJECTIVE:  /82   Ht 1.664 m (5' 5.5\")   Wt 104.3 kg (230 lb)   BMI 37.69 kg/m     General: healthy, alert and in no distress  HEENT: no scleral icterus or conjunctival erythema  Skin: no suspicious lesions or rash. No jaundice.  CV: distal perfusion intact   Resp: normal respiratory effort without conversational dyspnea   Psych: normal mood and affect  Gait: normal steady gait with appropriate coordination and balance   Neuro: Normal tone LE        Low " back exam:    ROM: no change LE symptoms with motion    Strength:     hip flexion 5/5       knee extension 5/5       ankle dorsiflexion 5/5       ankle plantarflexion 5/5       dorsiflexion of the great toe 5/5    Reflexes:     patellar (L3, L4) symmetric normal       achilles tendons (S1) symmetric normal    Sensation: min decrease anterolateral left thigh    Special tests:     straight leg raise left neg         slump test neg             Left hip exam    ROM: some hip pain end of rotation, more with IR    Special Tests:      Some pain with FADIR       Log roll min pain            RADIOLOGY:  I independently visualized and reviewed these images with the patient    No acute abnormality at hip. No indication images obtained weightbearing.    Results for orders placed or performed in visit on 01/05/22   XR Hip Left 2-3 Views    Narrative    HIP TWO VIEWS LEFT 1/5/2022 9:30 AM     HISTORY: Hip pain, left    COMPARISON: None.      Impression    IMPRESSION: No acute fracture or malalignment. There is normal left  hip joint spacing. Moderate degenerative changes of the left  sacroiliac joint and pubic symphysis. Severe degenerative changes at  the lower lumbar spine.    NAKUL CROWELL MD         SYSTEM ID:  [id number]               Again, thank you for allowing me to participate in the care of your patient.        Sincerely,        Randy Santiago DO

## 2022-01-11 NOTE — PROGRESS NOTES
ASSESSMENT & PLAN    Migdalia was seen today for pain.    Diagnoses and all orders for this visit:    Numbness and tingling of left lower extremity    Hip pain, left  -     Orthopedic  Referral            See Patient Instructions  Patient Instructions   Hip pain most suspected to be from some early degenerative change, arthritis; there is very minimal change on the x-rays noted, but that is so the primary suspicion.  Tingling into the left thigh favored to be from irritation of the lateral femoral cutaneous nerve, also called meralgia paresthetica.    For the left hip, we discussed exercise is okay.  Would offer physical therapy if interested.  May contact clinic referral if desired.  Discussed consideration of additional imaging with MRI, for better characterization of the joint.  If interested in this, can contact clinic.  Briefly also discussed injection therapy, for hip degenerative change.  This would be done with imaging guidance.  Defer for now, plan for primarily monitoring and activity modification.    With left thigh tingling, okay to monitor.  No other concerning findings on neurologic exam today.  There could be some benefits from rehab and therapy, also potentially from an injection if symptoms are persistent.  Otherwise, plan to monitor for now.    Options are as noted above.  Contact clinic if any questions or concerns, or if desiring to change course or pursue further intervention.  Otherwise, follow-up visit as needed.    If you have any further questions for your physician or physician s care team you can call [phone] and use option 3 to leave a voice message. Calls received during business hours will be returned same day.        DO ALEC [facility]      CC: Kristen M Kehr      -----  Chief Complaint   Patient presents with     Left Leg - Pain       SUBJECTIVE  Migdalia Escalera is a/an 66 year old female who is seen in consultation at  "the request of  Kristen M Kehr PA-C for evaluation of left leg pain.  Pain with standing.  Feels her leg goes numb and cold.  Difficulty getting up her stairs, feels like her hip is locking.  Pain over the lateral thigh and groin.    Denies any low back pain.      The patient is seen by themselves.    Onset: 1 years(s) ago. Reports insidious onset without acute precipitating event.  Location of Pain: left hip and thigh   Worsened by: standing, stairs   Better with: aleve   Treatments tried: Aleve  Associated symptoms: locking or catching    Orthopedic/Surgical history: HX of OA, with PT chart review shows it was for knees  Social History/Occupation: sell [employer]     No family history pertinent to patient's problem today.   **  Favoring left LE with walking, WB activities.  Pain is sometimes left anterior hip, more often in the anterolateral thigh.  Tingling can radiate to level of left knee. Does not radiate proximal to hip.  Locking/catching sensation is anterior hip, intermittent.  No low back pain. No history of low back issues.    Previous notes related to this issue reviewed.      REVIEW OF SYSTEMS:  Review of Systems   All other systems reviewed and are negative.        OBJECTIVE:  /82   Ht 1.664 m (5' 5.5\")   Wt 104.3 kg (230 lb)   BMI 37.69 kg/m     General: healthy, alert and in no distress  HEENT: no scleral icterus or conjunctival erythema  Skin: no suspicious lesions or rash. No jaundice.  CV: distal perfusion intact   Resp: normal respiratory effort without conversational dyspnea   Psych: normal mood and affect  Gait: normal steady gait with appropriate coordination and balance   Neuro: Normal tone LE        Low back exam:    ROM: no change LE symptoms with motion    Strength:     hip flexion 5/5       knee extension 5/5       ankle dorsiflexion 5/5       ankle plantarflexion 5/5       dorsiflexion of the great toe 5/5    Reflexes:     patellar (L3, L4) symmetric normal       achilles " tendons (S1) symmetric normal    Sensation: min decrease anterolateral left thigh    Special tests:     straight leg raise left neg         slump test neg             Left hip exam    ROM: some hip pain end of rotation, more with IR    Special Tests:      Some pain with FADIR       Log roll min pain            RADIOLOGY:  I independently visualized and reviewed these images with the patient    No acute abnormality at hip. No indication images obtained weightbearing.    Results for orders placed or performed in visit on 01/05/22   XR Hip Left 2-3 Views    Narrative    HIP TWO VIEWS LEFT 1/5/2022 9:30 AM     HISTORY: Hip pain, left    COMPARISON: None.      Impression    IMPRESSION: No acute fracture or malalignment. There is normal left  hip joint spacing. Moderate degenerative changes of the left  sacroiliac joint and pubic symphysis. Severe degenerative changes at  the lower lumbar spine.    NAKUL CROWELL MD         SYSTEM ID:  [id number]

## 2022-01-11 NOTE — PATIENT INSTRUCTIONS
Hip pain most suspected to be from some early degenerative change, arthritis; there is very minimal change on the x-rays noted, but that is so the primary suspicion.  Tingling into the left thigh favored to be from irritation of the lateral femoral cutaneous nerve, also called meralgia paresthetica.    For the left hip, we discussed exercise is okay.  Would offer physical therapy if interested.  May contact clinic referral if desired.  Discussed consideration of additional imaging with MRI, for better characterization of the joint.  If interested in this, can contact clinic.  Briefly also discussed injection therapy, for hip degenerative change.  This would be done with imaging guidance.  Defer for now, plan for primarily monitoring and activity modification.    With left thigh tingling, okay to monitor.  No other concerning findings on neurologic exam today.  There could be some benefits from rehab and therapy, also potentially from an injection if symptoms are persistent.  Otherwise, plan to monitor for now.    Options are as noted above.  Contact clinic if any questions or concerns, or if desiring to change course or pursue further intervention.  Otherwise, follow-up visit as needed.    If you have any further questions for your physician or physician s care team you can call [phone] and use option 3 to leave a voice message. Calls received during business hours will be returned same day.

## 2022-01-20 ENCOUNTER — TELEPHONE (OUTPATIENT)
Dept: ORTHOPEDICS | Facility: CLINIC | Age: 67
End: 2022-01-20
Payer: COMMERCIAL

## 2022-01-20 DIAGNOSIS — M25.552 HIP PAIN, LEFT: Primary | ICD-10-CM

## 2022-01-21 NOTE — TELEPHONE ENCOUNTER
Reason for call:  Other   Patient called regarding (reason for call): call back  Additional comments:   Patient would like to go ahead with the MRI.  Please call patient regarding this.    Phone number to reach patient:  Cell number on file:    Telephone Information:   Mobile [phone]       Best Time:  any    Can we leave a detailed message on this number?  YES    Travel screening: Not Applicable

## 2022-01-21 NOTE — TELEPHONE ENCOUNTER
Order signed.  Tentatively contact pt with MRI results, may have her return depending on findings.  Thanks.  Randy Santiago DO, CAQ

## 2022-01-21 NOTE — TELEPHONE ENCOUNTER
LVM for patient stating order has been placed. Provided imaging scheduling number.    Ruthie Johnson, ATC

## 2022-01-27 ENCOUNTER — ANCILLARY PROCEDURE (OUTPATIENT)
Dept: MRI IMAGING | Facility: CLINIC | Age: 67
End: 2022-01-27
Attending: PEDIATRICS
Payer: COMMERCIAL

## 2022-01-27 DIAGNOSIS — M25.552 HIP PAIN, LEFT: ICD-10-CM

## 2022-01-27 PROCEDURE — 73721 MRI JNT OF LWR EXTRE W/O DYE: CPT | Mod: LT | Performed by: RADIOLOGY

## 2022-01-28 ENCOUNTER — TELEPHONE (OUTPATIENT)
Dept: ORTHOPEDICS | Facility: CLINIC | Age: 67
End: 2022-01-28
Payer: COMMERCIAL

## 2022-01-28 DIAGNOSIS — M16.12 PRIMARY OSTEOARTHRITIS OF LEFT HIP: Primary | ICD-10-CM

## 2022-01-28 NOTE — TELEPHONE ENCOUNTER
Results for orders placed or performed in visit on 01/27/22   MR Hip Left w/o Contrast    Narrative    MR left hip without contrast 1/27/2022 12:18 PM    Techniques: Multiplanar multisequence imaging of the left hip was  obtained without  administration of intra-articular or intravenous  contrast using routine protocol.    History: Hip pain, chronic, osteoarthritis suspected; Hip pain, left     Comparison: 1/5/2022    Findings:    Osseous structures  Osseous structures: No fracture, stress reaction, avascular necrosis,  or focal osseous lesion is seen.    Subchondral edema-like marrow signal intensity in the acetabulum with  associated full-thickness chondral loss, consistent with degenerative  change. Degenerative changes of pubic symphysis with opposing  edema-like marrow signal intensity.    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    Marrow signal alteration including nodular focal marrow signal change  in the proximal femoral shaft, remaining hyperintense to regional  muscles, likely representing an area of prominent red marrow island.    Articular cartilage and labrum  Assessment limited on this non-arthrographic study due to relative  lack of joint distension.    Articular cartilage: Diffuse full-thickness chondral loss of the  acetabular cartilage.    Labrum: No tear suggested on this non-arthrographic study.    Ligament teres and transverse ligament of acetabulum: Intact.    Joint or bursal effusion    Joint effusion: Moderate hip joint effusion with internal debris,  presumably secondary to degenerative change.    Bursal effusion: Minimal nonspecific edema over the greater  trochanter. No substantial iliopsoas or trochanteric bursal effusion.    Muscles and tendons  Muscles and tendons: Tendinosis of the adductor arnulfo with  peritendinous edema at the ischial  attachment. Iliopsoas tendinosis.  Adductor muscle edema, consistent with strain. Proximal hamstring and  rectus femoris tendons intact. The hip abductors are intact.    Nerves:  The visualized course of the sciatic nerve is unremarkable.    Other Findings:  Colonic diverticulosis. Scattered external iliac chain and inguinal  lymph nodes, likely reactive.      Impression    Impression:  1. Left hip osteoarthritis with associated full-thickness chondral  loss with subchondral edema-like marrow signal intensities, moderate  hip joint effusion.  2. Tendinosis of the adductor arnulfo with peritendinous edema at the  ischial attachment.   3. Iliopsoas tendinosis.  4. Adductor muscle strain.    DatamyneAHASHI         SYSTEM ID:  [id number]

## 2022-01-31 NOTE — TELEPHONE ENCOUNTER
Called patient, LVM to return phone call regarding MRI results and next steps.    Vivian Paul ATC, CSCS

## 2022-01-31 NOTE — TELEPHONE ENCOUNTER
[facility] Call Center    Phone Message    May a detailed message be left on voicemail: yes     Reason for Call: Requesting Results   Name/type of test: Scan  Date of test: 01/27  Was test done at a location other than [facility] (Please fill in the location if not [facility])?: No      Action Taken: Other: Sports Medicine    Travel Screening: Not Applicable

## 2022-01-31 NOTE — TELEPHONE ENCOUNTER
MRI primarily shows left hip arthritis. There is also some muscular and tendon edema (inflammation).   Options: 1) physical therapy, 2) imaging guided steroid injection left hip.  Ok for one or the other or both.  If PT alone, monitor course 6 weeks to start.  I would be happy to have a visit with the patient (in person, by video, or by phone) to discuss further if that would be helpful.  Thanks.  Randy Santiago DO, CAQ

## 2022-02-01 ENCOUNTER — TELEPHONE (OUTPATIENT)
Dept: ORTHOPEDICS | Facility: CLINIC | Age: 67
End: 2022-02-01
Payer: COMMERCIAL

## 2022-02-01 NOTE — TELEPHONE ENCOUNTER
Patient returned call to the .  Spoke to patient and relayed results. She would like to try the injection and PT.   Appointment scheduled for injection and referral placed for PT.     Vanesa Escoto MS ATC

## 2022-02-01 NOTE — TELEPHONE ENCOUNTER
Patient LVM requesting a call back.     # [phone] (works 9-5)  C#[phone]    No additional information provided.

## 2022-02-03 ENCOUNTER — OFFICE VISIT (OUTPATIENT)
Dept: ORTHOPEDICS | Facility: CLINIC | Age: 67
End: 2022-02-03
Payer: COMMERCIAL

## 2022-02-03 DIAGNOSIS — M16.12 PRIMARY OSTEOARTHRITIS OF LEFT HIP: Primary | ICD-10-CM

## 2022-02-03 PROCEDURE — 20611 DRAIN/INJ JOINT/BURSA W/US: CPT | Mod: LT | Performed by: FAMILY MEDICINE

## 2022-02-03 RX ORDER — ROPIVACAINE HYDROCHLORIDE 5 MG/ML
2 INJECTION, SOLUTION EPIDURAL; INFILTRATION; PERINEURAL
Status: SHIPPED | OUTPATIENT
Start: 2022-02-03

## 2022-02-03 RX ORDER — BETAMETHASONE SODIUM PHOSPHATE AND BETAMETHASONE ACETATE 3; 3 MG/ML; MG/ML
6 INJECTION, SUSPENSION INTRA-ARTICULAR; INTRALESIONAL; INTRAMUSCULAR; SOFT TISSUE
Status: SHIPPED | OUTPATIENT
Start: 2022-02-03

## 2022-02-03 RX ADMIN — ROPIVACAINE HYDROCHLORIDE 2 ML: 5 INJECTION, SOLUTION EPIDURAL; INFILTRATION; PERINEURAL at 11:30

## 2022-02-03 RX ADMIN — BETAMETHASONE SODIUM PHOSPHATE AND BETAMETHASONE ACETATE 6 MG: 3; 3 INJECTION, SUSPENSION INTRA-ARTICULAR; INTRALESIONAL; INTRAMUSCULAR; SOFT TISSUE at 11:30

## 2022-02-03 NOTE — PROGRESS NOTES
Large Joint Injection/Arthocentesis: L hip joint    Date/Time: 2/3/2022 11:30 AM  Performed by: Ryan Eugene MD  Authorized by: Ryan Eugene MD     Indications:  Pain and osteoarthritis  Needle Size:  22 G  Guidance: ultrasound    Approach:  Anterior  Location:  Hip      Site:  L hip joint  Medications:  6 mg betamethasone acet & sod phos 6 (3-3) MG/ML; 2 mL ropivacaine 5 MG/ML  Outcome:  Tolerated well, no immediate complications  Procedure discussed: discussed risks, benefits, and alternatives    Consent Given by:  Patient  Timeout: timeout called immediately prior to procedure    Prep: patient was prepped and draped in usual sterile fashion     Referred by Dr. Santiago     Patient reported significant improvement of pain after the numbing portion left hip steroid injection.  Aftercare instructions given to patient.  Plan to follow-up as previously discussed with referring provider.     Ryan Eugene MD [facility]

## 2022-02-03 NOTE — LETTER
2/3/2022         RE: Migdalia Escalera  [address] [facility] [address]        Dear Colleague,    Thank you for referring your patient, Migdalia Escalera, to the [facility]. Please see a copy of my visit note below.    Large Joint Injection/Arthocentesis: L hip joint    Date/Time: 2/3/2022 11:30 AM  Performed by: Ryan Eugene MD  Authorized by: Ryan Eugene MD     Indications:  Pain and osteoarthritis  Needle Size:  22 G  Guidance: ultrasound    Approach:  Anterior  Location:  Hip      Site:  L hip joint  Medications:  6 mg betamethasone acet & sod phos 6 (3-3) MG/ML; 2 mL ropivacaine 5 MG/ML  Outcome:  Tolerated well, no immediate complications  Procedure discussed: discussed risks, benefits, and alternatives    Consent Given by:  Patient  Timeout: timeout called immediately prior to procedure    Prep: patient was prepped and draped in usual sterile fashion     Referred by Dr. Santiago     Patient reported significant improvement of pain after the numbing portion left hip steroid injection.  Aftercare instructions given to patient.  Plan to follow-up as previously discussed with referring provider.     Ryan Eugene MD [facility]              Again, thank you for allowing me to participate in the care of your patient.        Sincerely,        Ryan Eugene MD

## 2022-02-03 NOTE — PATIENT INSTRUCTIONS
[facility] Injection Discharge Instructions    Procedure: Left hip steroid injection    Follow-up: Every 3+ months can consider repeat steroid injections.  Can consider platelet rich plasma injections to help with pain.  This is $800 cash.  There is no evidence that viscosupplementation or gel injections help in this would be an out-of-pocket expense approximately $1200-$2000 depending on the product.        You may shower, however avoid swimming, tub baths or hot tubs for 24 hours following your procedure    You may have a mild to moderate increase in pain for several days following the injection.    It may take up to 14 days for the steroid medication to start working although you may feel the effect as early as a few days after the procedure.    You may use ice packs for 10-15 minutes, 3 to 4 times a day at the injection site for comfort    You may use anti-inflammatory medications (such as Ibuprofen or Aleve or Advil) or Tylenol for pain control if necessary    If you were fasting, you may resume your normal diet and medications after the procedure    If you have diabetes, check your blood sugar more frequently than usual as your blood sugar may be higher than normal for 10-14 days following a steroid injection. Contact your doctor who manages your diabetes if your blood sugar is higher than usual      If you experience any of the following, call [facility] @ [phone] or [phone]  -Fever over 100 degree F  -Swelling, bleeding, redness, drainage, warmth at the injection site  - New or worsening pain

## 2022-02-07 ENCOUNTER — TELEPHONE (OUTPATIENT)
Dept: URGENT CARE | Facility: URGENT CARE | Age: 67
End: 2022-02-07
Payer: COMMERCIAL

## 2022-02-07 ENCOUNTER — TELEPHONE (OUTPATIENT)
Dept: ORTHOPEDICS | Facility: CLINIC | Age: 67
End: 2022-02-07
Payer: COMMERCIAL

## 2022-02-07 NOTE — TELEPHONE ENCOUNTER
[facility] Call Center    Phone Message:  Pt would like a CALL BACK to discuss her LEFT hip and how the Cortisone injection, that she received, is not helping her pain.    May a detailed message be left on voicemail: Yes     Reason for Call: Other: Cortisone Injection Not Helping:  CALL BACK     Action [facility]  [facility]    Travel Screening: Not Applicable

## 2022-02-09 NOTE — TELEPHONE ENCOUNTER
[facility] Call Center    Phone Message    May a detailed message be left on voicemail: yes     Reason for Call: Other: patient is returning call     Action Taken: Message routed to:  Other: bu ortho    Travel Screening: Not Applicable     Patient needs Ruthie to call her work phone @[phone] Today , she will be working 9-5

## 2022-02-10 ENCOUNTER — OFFICE VISIT (OUTPATIENT)
Dept: ORTHOPEDICS | Facility: CLINIC | Age: 67
End: 2022-02-10
Payer: COMMERCIAL

## 2022-02-10 VITALS
BODY MASS INDEX: 36.96 KG/M2 | HEIGHT: 66 IN | SYSTOLIC BLOOD PRESSURE: 122 MMHG | WEIGHT: 230 LBS | DIASTOLIC BLOOD PRESSURE: 84 MMHG

## 2022-02-10 DIAGNOSIS — M67.952 TENDINOPATHY OF LEFT GLUTEAL REGION: Primary | ICD-10-CM

## 2022-02-10 PROCEDURE — 99213 OFFICE O/P EST LOW 20 MIN: CPT | Mod: 25 | Performed by: FAMILY MEDICINE

## 2022-02-10 PROCEDURE — 20611 DRAIN/INJ JOINT/BURSA W/US: CPT | Mod: LT | Performed by: FAMILY MEDICINE

## 2022-02-10 RX ORDER — BETAMETHASONE SODIUM PHOSPHATE AND BETAMETHASONE ACETATE 3; 3 MG/ML; MG/ML
6 INJECTION, SUSPENSION INTRA-ARTICULAR; INTRALESIONAL; INTRAMUSCULAR; SOFT TISSUE
Status: SHIPPED | OUTPATIENT
Start: 2022-02-10

## 2022-02-10 RX ORDER — ROPIVACAINE HYDROCHLORIDE 5 MG/ML
2 INJECTION, SOLUTION EPIDURAL; INFILTRATION; PERINEURAL
Status: SHIPPED | OUTPATIENT
Start: 2022-02-10

## 2022-02-10 RX ADMIN — ROPIVACAINE HYDROCHLORIDE 2 ML: 5 INJECTION, SOLUTION EPIDURAL; INFILTRATION; PERINEURAL at 15:53

## 2022-02-10 RX ADMIN — BETAMETHASONE SODIUM PHOSPHATE AND BETAMETHASONE ACETATE 6 MG: 3; 3 INJECTION, SUSPENSION INTRA-ARTICULAR; INTRALESIONAL; INTRAMUSCULAR; SOFT TISSUE at 15:53

## 2022-02-10 ASSESSMENT — MIFFLIN-ST. JEOR: SCORE: 1582.08

## 2022-02-10 NOTE — LETTER
"    2/10/2022         RE: Migdalia Escalera  [address] [place] [id number]        Dear Colleague,    Thank you for referring your patient, Migdalia Escalera, to the [facility]. Please see a copy of my visit note below.    ASSESSMENT & PLAN    # Left Lateral Hip Pain: Notable over the lateral hip with pain persisting despite left hip joint steroid injection on 2/3/22.  Pain over lateral hip with pain worse with gluteal tendon testing.  Likely cause her pain due to irritated gluteal tendons.  Counseled on treatment options including home exercise, physical therapy, steroid injection, anti-inflammatories.  Given this plan to treat as below and follow-up if not improving.    Image Findings: reviewed hip MRI  Treatment: Activities as tolerated, home exercises given today  Job: as tolerated  Medications/Injections: Limited tylenol/ibuprofen for pain for 1-2 weeks, left greater trochanter steroid injection  Follow-up: In one month if symptoms do not improve, sooner if worsening  Can consider further evaluation    -----    SUBJECTIVE:  Migdalia Escalera is a 66 year old female who is seen in follow-up for left hip pain.They were last seen 2/3/2022 and left hip IA injection performed.  The patient is seen by themselves.    Since their last visit reports pain now in lateral portion of left hip.  They indicate that their current pain level is 8/10. They have tried using cane.  Has some tingling going down leg as well.        Patient's past medical, surgical, social, and family histories were reviewed today and no changes are noted.    REVIEW OF SYSTEMS:  Constitutional: NEGATIVE for fever, chills, change in weight  Skin: NEGATIVE for worrisome rashes, moles or lesions  GI/: NEGATIVE for bowel or bladder changes  Neuro: NEGATIVE for weakness, dizziness or paresthesias    OBJECTIVE:  /84   Ht 1.664 m (5' 5.5\")   Wt 104.3 kg (230 lb)   BMI 37.69 kg/m     General: healthy, alert " and in no distress  HEENT: no scleral icterus or conjunctival erythema  Skin: no suspicious lesions or rash. No jaundice.  CV: regular rhythm by palpation, no pedal edema  Resp: normal respiratory effort without conversational dyspnea   Psych: normal mood and affect  Gait: normal steady gait with appropriate coordination and balance  Neuro: normal light touch sensory exam of the extremities.    MSK:    LEFT HIP  Inspection:    No swelling, bruising, discoloration, or obvious deformity or asymmetry  Palpation:    Tender about the greater trochanteric region. Otherwise all other landmarks are nontender.    Crepitus is Absent  Active Range of Motion:     Flexion full, extension full / IR full / ER  full  Strength:    Flexion 5/5 / extension 5/5 / adduction 5/5 / abduction 5/5  Special Tests:    Positive: Benjamin's    Negative: Logroll, OLENA, anterior impingement (FADIR)    Independent visualization of the below image:  MR left hip without contrast 1/27/2022 12:18 PM     Techniques: Multiplanar multisequence imaging of the left hip was  obtained without  administration of intra-articular or intravenous  contrast using routine protocol.     History: Hip pain, chronic, osteoarthritis suspected; Hip pain, left      Comparison: 1/5/2022     Findings:     Osseous structures  Osseous structures: No fracture, stress reaction, avascular necrosis,  or focal osseous lesion is seen.     Subchondral edema-like marrow signal intensity in the acetabulum with  associated full-thickness chondral loss, consistent with degenerative  change. Degenerative changes of pubic symphysis with opposing  edema-like marrow signal intensity.     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     Marrow signal alteration including nodular focal marrow signal change  in the proximal femoral shaft, remaining  hyperintense to regional  muscles, likely representing an area of prominent red marrow island.     Articular cartilage and labrum  Assessment limited on this non-arthrographic study due to relative  lack of joint distension.     Articular cartilage: Diffuse full-thickness chondral loss of the  acetabular cartilage.     Labrum: No tear suggested on this non-arthrographic study.     Ligament teres and transverse ligament of acetabulum: Intact.     Joint or bursal effusion     Joint effusion: Moderate hip joint effusion with internal debris,  presumably secondary to degenerative change.     Bursal effusion: Minimal nonspecific edema over the greater  trochanter. No substantial iliopsoas or trochanteric bursal effusion.     Muscles and tendons  Muscles and tendons: Tendinosis of the adductor arnulfo with  peritendinous edema at the ischial attachment. Iliopsoas tendinosis.  Adductor muscle edema, consistent with strain. Proximal hamstring and  rectus femoris tendons intact. The hip abductors are intact.     Nerves:  The visualized course of the sciatic nerve is unremarkable.     Other Findings:  Colonic diverticulosis. Scattered external iliac chain and inguinal  lymph nodes, likely reactive.                                                                      Impression:  1. Left hip osteoarthritis with associated full-thickness chondral  loss with subchondral edema-like marrow signal intensities, moderate  hip joint effusion.  2. Tendinosis of the adductor arnulfo with peritendinous edema at the  ischial attachment.   3. Iliopsoas tendinosis.  4. Adductor muscle strain.     LORENZO Eugene MD, [facility]    Large Joint Injection/Arthocentesis: L greater trochanteric bursa    Date/Time: 2/10/2022 3:53 PM  Performed by: Ryan Eugene MD  Authorized by: Ryan Eugene MD     Indications:  Pain  Needle Size:  22 G  Guidance: ultrasound    Approach:   Lateral  Location:  Hip      Site:  L greater trochanteric bursa  Medications:  6 mg betamethasone acet & sod phos 6 (3-3) MG/ML; 2 mL ropivacaine 5 MG/ML  Outcome:  Tolerated well, no immediate complications  Procedure discussed: discussed risks, benefits, and alternatives    Consent Given by:  Patient  Timeout: timeout called immediately prior to procedure    Prep: patient was prepped and draped in usual sterile fashion     Patient reported significant improvement of pain after the numbing portion left greater trochanter steroid injection.  Ultrasound guided images were permanently stored on ultrasound machine.  Aftercare instructions given to patient.  Plan to follow-up as discussed above.     Ryan Eugene MD [facility]              Again, thank you for allowing me to participate in the care of your patient.        Sincerely,        Ryan Eugene MD

## 2022-02-10 NOTE — PROGRESS NOTES
"ASSESSMENT & PLAN    # Left Lateral Hip Pain: Notable over the lateral hip with pain persisting despite left hip joint steroid injection on 2/3/22.  Pain over lateral hip with pain worse with gluteal tendon testing.  Likely cause her pain due to irritated gluteal tendons.  Counseled on treatment options including home exercise, physical therapy, steroid injection, anti-inflammatories.  Given this plan to treat as below and follow-up if not improving.    Image Findings: reviewed hip MRI  Treatment: Activities as tolerated, home exercises given today  Job: as tolerated  Medications/Injections: Limited tylenol/ibuprofen for pain for 1-2 weeks, left greater trochanter steroid injection  Follow-up: In one month if symptoms do not improve, sooner if worsening  Can consider further evaluation    -----    SUBJECTIVE:  Migdalia Escalera is a 66 year old female who is seen in follow-up for left hip pain.They were last seen 2/3/2022 and left hip IA injection performed.  The patient is seen by themselves.    Since their last visit reports pain now in lateral portion of left hip.  They indicate that their current pain level is 8/10. They have tried using cane.  Has some tingling going down leg as well.        Patient's past medical, surgical, social, and family histories were reviewed today and no changes are noted.    REVIEW OF SYSTEMS:  Constitutional: NEGATIVE for fever, chills, change in weight  Skin: NEGATIVE for worrisome rashes, moles or lesions  GI/: NEGATIVE for bowel or bladder changes  Neuro: NEGATIVE for weakness, dizziness or paresthesias    OBJECTIVE:  /84   Ht 1.664 m (5' 5.5\")   Wt 104.3 kg (230 lb)   BMI 37.69 kg/m     General: healthy, alert and in no distress  HEENT: no scleral icterus or conjunctival erythema  Skin: no suspicious lesions or rash. No jaundice.  CV: regular rhythm by palpation, no pedal edema  Resp: normal respiratory effort without conversational dyspnea   Psych: normal mood and " affect  Gait: normal steady gait with appropriate coordination and balance  Neuro: normal light touch sensory exam of the extremities.    MSK:    LEFT HIP  Inspection:    No swelling, bruising, discoloration, or obvious deformity or asymmetry  Palpation:    Tender about the greater trochanteric region. Otherwise all other landmarks are nontender.    Crepitus is Absent  Active Range of Motion:     Flexion full, extension full / IR full / ER  full  Strength:    Flexion 5/5 / extension 5/5 / adduction 5/5 / abduction 5/5  Special Tests:    Positive: Benjamin's    Negative: Logroll, OLENA, anterior impingement (FADIR)    Independent visualization of the below image:  MR left hip without contrast 1/27/2022 12:18 PM     Techniques: Multiplanar multisequence imaging of the left hip was  obtained without  administration of intra-articular or intravenous  contrast using routine protocol.     History: Hip pain, chronic, osteoarthritis suspected; Hip pain, left      Comparison: 1/5/2022     Findings:     Osseous structures  Osseous structures: No fracture, stress reaction, avascular necrosis,  or focal osseous lesion is seen.     Subchondral edema-like marrow signal intensity in the acetabulum with  associated full-thickness chondral loss, consistent with degenerative  change. Degenerative changes of pubic symphysis with opposing  edema-like marrow signal intensity.     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     Marrow signal alteration including nodular focal marrow signal change  in the proximal femoral shaft, remaining hyperintense to regional  muscles, likely representing an area of prominent red marrow island.     Articular cartilage and labrum  Assessment limited on this non-arthrographic study due to relative  lack of joint distension.     Articular cartilage: Diffuse  full-thickness chondral loss of the  acetabular cartilage.     Labrum: No tear suggested on this non-arthrographic study.     Ligament teres and transverse ligament of acetabulum: Intact.     Joint or bursal effusion     Joint effusion: Moderate hip joint effusion with internal debris,  presumably secondary to degenerative change.     Bursal effusion: Minimal nonspecific edema over the greater  trochanter. No substantial iliopsoas or trochanteric bursal effusion.     Muscles and tendons  Muscles and tendons: Tendinosis of the adductor arnulfo with  peritendinous edema at the ischial attachment. Iliopsoas tendinosis.  Adductor muscle edema, consistent with strain. Proximal hamstring and  rectus femoris tendons intact. The hip abductors are intact.     Nerves:  The visualized course of the sciatic nerve is unremarkable.     Other Findings:  Colonic diverticulosis. Scattered external iliac chain and inguinal  lymph nodes, likely reactive.                                                                      Impression:  1. Left hip osteoarthritis with associated full-thickness chondral  loss with subchondral edema-like marrow signal intensities, moderate  hip joint effusion.  2. Tendinosis of the adductor arnulfo with peritendinous edema at the  ischial attachment.   3. Iliopsoas tendinosis.  4. Adductor muscle strain.     LORENZO Eugene MD, [facility]    Large Joint Injection/Arthocentesis: L greater trochanteric bursa    Date/Time: 2/10/2022 3:53 PM  Performed by: Ryan Eugene MD  Authorized by: Ryan Eugene MD     Indications:  Pain  Needle Size:  22 G  Guidance: ultrasound    Approach:  Lateral  Location:  Hip      Site:  L greater trochanteric bursa  Medications:  6 mg betamethasone acet & sod phos 6 (3-3) MG/ML; 2 mL ropivacaine 5 MG/ML  Outcome:  Tolerated well, no immediate complications  Procedure discussed: discussed risks, benefits, and  alternatives    Consent Given by:  Patient  Timeout: timeout called immediately prior to procedure    Prep: patient was prepped and draped in usual sterile fashion     Patient reported significant improvement of pain after the numbing portion left greater trochanter steroid injection.  Ultrasound guided images were permanently stored on ultrasound machine.  Aftercare instructions given to patient.  Plan to follow-up as discussed above.     Ryan Eugene MD [facility]

## 2022-02-10 NOTE — PATIENT INSTRUCTIONS
# Left Lateral Hip Pain: Notable over the lateral hip with pain persisting despite left hip joint steroid injection.  Pain over lateral hip with pain worse with gluteal tendon testing.  Likely cause her pain due to irritated gluteal tendons.  Counseled on treatment options including home exercise, physical therapy, steroid injection, anti-inflammatories.  Given this plan to treat as below and follow-up if not improving.    Image Findings: reviewed hip MRI  Treatment: Activities as tolerated, home exercises given today  Job: as tolerated  Medications/Injections: Limited tylenol/ibuprofen for pain for 1-2 weeks, left greater trochanter steroid injection  Follow-up: In one month if symptoms do not improve, sooner if worsening  Can consider further evaluation    Please call [phone]   Ask for my team if you have any questions or concerns    If you have not yet received the influenza vaccine but would like to get one, please call  [phone] or you can schedule via [facility]    It was great seeing you again today!    Ryan Eugene MD, CAQSM       FS Injection Discharge Instructions    Procedure: Left greater trochanter steroid injection       You may shower, however avoid swimming, tub baths or hot tubs for 24 hours following your procedure    You may have a mild to moderate increase in pain for several days following the injection.    It may take up to 14 days for the steroid medication to start working although you may feel the effect as early as a few days after the procedure.    You may use ice packs for 10-15 minutes, 3 to 4 times a day at the injection site for comfort    You may use anti-inflammatory medications (such as Ibuprofen or Aleve or Advil) or Tylenol for pain control if necessary    If you were fasting, you may resume your normal diet and medications after the procedure    If you have diabetes, check your blood sugar more frequently than usual as your blood sugar may be higher than normal  for 10-14 days following a steroid injection. Contact your doctor who manages your diabetes if your blood sugar is higher than usual      If you experience any of the following, call [facility] @ [phone] or [phone]  -Fever over 100 degree F  -Swelling, bleeding, redness, drainage, warmth at the injection site  - New or worsening pain

## 2022-02-16 ENCOUNTER — THERAPY VISIT (OUTPATIENT)
Dept: PHYSICAL THERAPY | Facility: CLINIC | Age: 67
End: 2022-02-16
Attending: FAMILY MEDICINE
Payer: COMMERCIAL

## 2022-02-16 DIAGNOSIS — M25.552 HIP PAIN, LEFT: ICD-10-CM

## 2022-02-16 DIAGNOSIS — M67.952 TENDINOPATHY OF LEFT GLUTEAL REGION: ICD-10-CM

## 2022-02-16 PROCEDURE — 97110 THERAPEUTIC EXERCISES: CPT | Mod: GP | Performed by: PHYSICAL THERAPIST

## 2022-02-16 PROCEDURE — 97161 PT EVAL LOW COMPLEX 20 MIN: CPT | Mod: GP | Performed by: PHYSICAL THERAPIST

## 2022-02-16 ASSESSMENT — ACTIVITIES OF DAILY LIVING (ADL)
WALKING_INITIALLY: EXTREME DIFFICULTY
HOW_WOULD_YOU_RATE_YOUR_CURRENT_LEVEL_OF_FUNCTION_DURING_YOUR_USUAL_ACTIVITIES_OF_DAILY_LIVING_FROM_0_TO_100_WITH_100_BEING_YOUR_LEVEL_OF_FUNCTION_PRIOR_TO_YOUR_HIP_PROBLEM_AND_0_BEING_THE_INABILITY_TO_PERFORM_ANY_OF_YOUR_USUAL_DAILY_ACTIVITIES?: 25
SITTING_FOR_15_MINUTES: NO DIFFICULTY AT ALL
GETTING_INTO_AND_OUT_OF_A_BATHTUB: SLIGHT DIFFICULTY
GETTING_INTO_AND_OUT_OF_AN_AVERAGE_CAR: SLIGHT DIFFICULTY
WALKING_15_MINUTES_OR_GREATER: EXTREME DIFFICULTY
TWISTING/PIVOTING_ON_INVOLVED_LEG: EXTREME DIFFICULTY
STEPPING_UP_AND_DOWN_CURBS: MODERATE DIFFICULTY
HOS_ADL_COUNT: 16
PUTTING_ON_SOCKS_AND_SHOES: MODERATE DIFFICULTY
ROLLING_OVER_IN_BED: SLIGHT DIFFICULTY
HOS_ADL_HIGHEST_POTENTIAL_SCORE: 64
WALKING_DOWN_STEEP_HILLS: SLIGHT DIFFICULTY
LIGHT_TO_MODERATE_WORK: MODERATE DIFFICULTY
STANDING_FOR_15_MINUTES: MODERATE DIFFICULTY
WALKING_APPROXIMATELY_10_MINUTES: EXTREME DIFFICULTY
HOS_ADL_SCORE(%): 50
HEAVY_WORK: MODERATE DIFFICULTY
WALKING_UP_STEEP_HILLS: MODERATE DIFFICULTY
GOING_UP_1_FLIGHT_OF_STAIRS: MODERATE DIFFICULTY
GOING_DOWN_1_FLIGHT_OF_STAIRS: MODERATE DIFFICULTY
HOS_ADL_ITEM_SCORE_TOTAL: 32
RECREATIONAL_ACTIVITIES: MODERATE DIFFICULTY

## 2022-02-16 NOTE — PROGRESS NOTES
[facility]    OUTPATIENT Physical Therapy ORTHOPEDIC EVALUATION  PLAN OF TREATMENT FOR OUTPATIENT REHABILITATION  (COMPLETE FOR INITIAL CLAIMS ONLY)  Patient's Last Name, First Name, M.I.  YOB: 1955  Migdalia Escalera    Provider s Name:  [facility]   Medical Record No.  [id number]   Start of Care Date:  02/16/22   Onset Date:   02/10/22 (date of MD order)   Type:     _X__PT   ___OT Medical Diagnosis:    Encounter Diagnoses   Name Primary?     Tendinopathy of left gluteal region      Hip pain, left         Treatment Diagnosis:  L hip pain        Goals:     02/16/22 0500   Body Part   Goals listed below are for L hip pain   Goal #1   Goal #1 ambulation   Previous Functional Level No restrictions   Performance Level no limits, no pain   Current Functional Level   (walking )   Performance Level using cane, trunk flexed, dec WB on L, PL 8/10 starts with first steps   STG Target Performance   (gait)   Performance Level 4/10 PL or less with first steps, posture upright, equal wt bearing   Rationale for safe community ambulation;to maintain proper body mechanics/posture while ambulating to avoid additional compensatory injury due to improper gait mechanics   Due Date 03/09/22    LTG Target Performance Minutes patient will be able to  walk   Performance Level 20 painfree, no A device, good pattern   Rationale for safe community ambulation;to maintain proper body mechanics/posture while ambulating to avoid additional compensatory injury due to improper gait mechanics   Due Date 03/30/22       Therapy Frequency:  1x/wk for 6 wks tapering to 2x/month for 2 months  Predicted Duration of Therapy Intervention:  14 wks    Geena Mendez, PT                 I CERTIFY THE NEED FOR THESE SERVICES FURNISHED UNDER        THIS PLAN OF TREATMENT AND WHILE UNDER MY CARE     (Physician  attestation of this document indicates review and certification of the therapy plan).                       Certification Date From:  02/16/22   Certification Date To:  05/16/22    Referring Provider:  Ryan Eugene    Initial Assessment        See Epic Evaluation SOC Date: 02/16/22

## 2022-02-16 NOTE — PROGRESS NOTES
"Physical Therapy Initial Evaluation  Subjective:  The history is provided by the patient. No  was used.   Patient Health History  Migdalia Escalera being seen for L leg.     Date of Onset: approx 6 mos.   Problem occurred: \"leg got sore\"   Pain is reported as 8/10 on pain scale.  General health as reported by patient is good.  Pertinent medical history includes: high blood pressure, thyroid problems and overweight.   Red flags:  None as reported by patient.  Medical allergies: other (amoxicillin).   Surgeries include:  Orthopedic surgery (thoracic).    Current medications:  High blood pressure medication.    Current occupation is sales.   Primary job tasks include:  Computer work.                  Therapist Generated HPI Evaluation  Problem details: Date of MD order for this episode was 2-10-22. Migdalia presents today for L hip pain. An MRI 1-27-22 revealed L hip OA, tendinosis adductor arnulfo, iliopsoas tendinosis, adductor mm strain. 2-3-22 she had an  intraarticular injection(no change) and 2-10-22 a trochanteric bursa injection.   Migdalia reports on set of L hip pain about 6 mos ago, no injury. Has gotten progressively worse over the past 2 mos. .         Type of problem:  Left hip.    This is a chronic condition.  Condition occurred with:  Insidious onset.  Where condition occurred: for unknown reasons.  Patient reports pain:  Lateral (thigh).  Pain is described as sharp and aching (inflammed) and is constant.  Pain radiates to:  Other (sometimes lateral lower leg). Pain is the same all the time.  Since onset symptoms are gradually worsening.  Associated symptoms:  Buckling/giving out, loss of motion/stiffness and loss of strength. Symptoms are exacerbated by walking, ascending stairs, descending stairs, other, standing, transfers, activity, weight bearing and bending/squatting (twisting, getting socks/shoes on)  and relieved by other (sitting).  Special tests included:  MRI.    Restrictions due " to condition include:  Working in normal job without restrictions.  Barriers include:  Stairs and bathroom/bedroom on second floor.                        Objective:  Standing Alignment:                  General deviations alignment: trunk flexed and less wt through L side.  Gait:  Using in L hand, given rationale for putting cane in R hand and practiced  Gait Type:  Antalgic   Assistive Devices:  Cane      Flexibility/Screens:       Lower Extremity:  Decreased left lower extremity flexibility:Adductors; Hip Flexors and Hamstrings                 Lumbar/SI Evaluation  ROM:    AROM Lumbar:   Flexion:            75%  Ext:                    50%   Side Bend:        Left:  Wnl    Right:  Wnl  Rotation:           Left:  Wnl    Right:  Wnl  Side Glide:        Left:     Right:                                                               Hip Evaluation  HIP AROM:    Flexion: Left: 80    Right:  110                  Hip PROM:    Flexion: Left: 107   Right: 115        Internal Rotation: Left: slight pain with limited end range   Right:  External Rotation: Left: wnl   Right:              Hip Strength:    Flexion:   Left: 3-/5   ++  Pain:  Right: 5/5   Pain:                    Extension:  Left: 3-/5  Pain:Right: 3/5    Pain:    Abduction:  Left: 3-/5    +   Pain:Right: 3+/5    Pain:    Internal Rotation:  Left: 5/5    Pain:Right: 5/5   Pain:  External Rotation:  Left: 3/5   ++  Pain:  Right: 5/5   Pain:  Knee Flexion:  Left: 5/5   Pain:Right: 5/5   Pain:  Knee Extension:  Left: 5/5   Pain:Right: 5/5    Pain:        Hip Special Testing:      Left hip negative for the following special tests:  Piriformis; Azeem; Fadir/Labrum or SLR      Hip Palpation:    Left hip tenderness present at:   hip flexors; Adductors; Abductors and Gluteus Medius  Left hip tenderness not present at:  Ischial Tuberosity; Greater Trachanter; Piriformis; PSIS or ASIS               General     ROS    Assessment/Plan:    Patient is a 66 year old female with  left side hip complaints.    Patient has the following significant findings with corresponding treatment plan.                Diagnosis 1:  L hip pain  Pain -  hot/cold therapy, manual therapy, self management, education and home program  Decreased ROM/flexibility - manual therapy, therapeutic exercise and home program  Decreased strength - therapeutic exercise, therapeutic activities and home program  Impaired gait - gait training and home program  Impaired muscle performance - neuro re-education and home program  Decreased function - therapeutic activities and home program    Therapy Evaluation Codes:   1) History comprised of:   Personal factors that impact the plan of care:      Age and Time since onset of symptoms.    Comorbidity factors that impact the plan of care are:      None.     Medications impacting care: None.  2) Examination of Body Systems comprised of:   Body structures and functions that impact the plan of care:      Hip.   Activity limitations that impact the plan of care are:      Bathing, Bending, Cooking, Driving, Dressing, Lifting, Sitting, Squatting/kneeling, Stairs, Standing, Walking, Working, Sleeping and Laying down.  3) Clinical presentation characteristics are:   Stable/Uncomplicated.  4) Decision-Making    Low complexity using standardized patient assessment instrument and/or measureable assessment of functional outcome.  Cumulative Therapy Evaluation is: Low complexity.    Previous and current functional limitations:  (See Goal Flow Sheet for this information)    Short term and Long term goals: (See Goal Flow Sheet for this information)     Communication ability:  Patient appears to be able to clearly communicate and understand verbal and written communication and follow directions correctly.  Treatment Explanation - The following has been discussed with the patient:   RX ordered/plan of care  Anticipated outcomes  Possible risks and side effects  This patient would benefit from PT  intervention to resume normal activities.   Rehab potential is good.    Frequency:  1 X week, once daily  Duration:  for 6 weeks tapering to 2 X a month over 2 months   Discharge Plan:  Achieve all LTG.  Independent in home treatment program.  Reach maximal therapeutic benefit.    Please refer to the daily flowsheet for treatment today, total treatment time and time spent performing 1:1 timed codes.

## 2022-02-17 PROBLEM — Z71.89 ADVANCED DIRECTIVES, COUNSELING/DISCUSSION: Status: ACTIVE | Noted: 2017-05-24

## 2022-02-22 ENCOUNTER — THERAPY VISIT (OUTPATIENT)
Dept: PHYSICAL THERAPY | Facility: CLINIC | Age: 67
End: 2022-02-22
Payer: COMMERCIAL

## 2022-02-22 DIAGNOSIS — M25.552 HIP PAIN, LEFT: ICD-10-CM

## 2022-02-22 PROCEDURE — 97140 MANUAL THERAPY 1/> REGIONS: CPT | Mod: GP | Performed by: PHYSICAL THERAPIST

## 2022-02-22 PROCEDURE — 97110 THERAPEUTIC EXERCISES: CPT | Mod: GP | Performed by: PHYSICAL THERAPIST

## 2022-02-22 PROCEDURE — 97530 THERAPEUTIC ACTIVITIES: CPT | Mod: GP | Performed by: PHYSICAL THERAPIST

## 2022-03-01 ENCOUNTER — THERAPY VISIT (OUTPATIENT)
Dept: PHYSICAL THERAPY | Facility: CLINIC | Age: 67
End: 2022-03-01
Payer: COMMERCIAL

## 2022-03-01 DIAGNOSIS — M25.552 HIP PAIN, LEFT: ICD-10-CM

## 2022-03-01 PROCEDURE — 97530 THERAPEUTIC ACTIVITIES: CPT | Mod: GP | Performed by: PHYSICAL THERAPIST

## 2022-03-01 PROCEDURE — 97110 THERAPEUTIC EXERCISES: CPT | Mod: GP | Performed by: PHYSICAL THERAPIST

## 2022-03-01 PROCEDURE — 97140 MANUAL THERAPY 1/> REGIONS: CPT | Mod: GP | Performed by: PHYSICAL THERAPIST

## 2022-03-01 NOTE — PROGRESS NOTES
SUBJECTIVE  Subjective changes as noted by pt: Patient reports benefit from manual therapy last visit - felt better for about 1 day. Overall feels about the same this week - good days and bad. Worst putting on socks and shoes, walking greater than   Current pain level: 0/10 (5/10 with walking)   Changes in function:  None     Adverse reaction to treatment or activity:  None    OBJECTIVE  Changes in objective findings: Improving PROM. AROM remains limited and painful. Able to tolerate putting shoes on in hip flex/ER with stretch only, no pain.     ASSESSMENT  Migdalia continues to require intervention to meet STG and LTG's: PT  Patient is progressing as expected.  Response to therapy has shown an improvement in pain level and ROM   Progress made towards STG/LTG?  None    PLAN  Current treatment program is being advanced to more complex exercises.    PTA/ATC plan:  N/A    Please refer to the daily flowsheet for treatment today, total treatment time and time spent performing 1:1 timed codes.

## 2022-03-08 ENCOUNTER — THERAPY VISIT (OUTPATIENT)
Dept: PHYSICAL THERAPY | Facility: CLINIC | Age: 67
End: 2022-03-08
Payer: COMMERCIAL

## 2022-03-08 DIAGNOSIS — M25.552 HIP PAIN, LEFT: ICD-10-CM

## 2022-03-08 PROCEDURE — 97530 THERAPEUTIC ACTIVITIES: CPT | Mod: GP | Performed by: PHYSICAL THERAPIST

## 2022-03-08 PROCEDURE — 97110 THERAPEUTIC EXERCISES: CPT | Mod: GP | Performed by: PHYSICAL THERAPIST

## 2022-03-08 PROCEDURE — 97140 MANUAL THERAPY 1/> REGIONS: CPT | Mod: GP | Performed by: PHYSICAL THERAPIST

## 2022-03-10 ENCOUNTER — OFFICE VISIT (OUTPATIENT)
Dept: FAMILY MEDICINE | Facility: CLINIC | Age: 67
End: 2022-03-10
Payer: COMMERCIAL

## 2022-03-10 VITALS
HEART RATE: 74 BPM | TEMPERATURE: 97.6 F | DIASTOLIC BLOOD PRESSURE: 76 MMHG | OXYGEN SATURATION: 95 % | SYSTOLIC BLOOD PRESSURE: 136 MMHG

## 2022-03-10 DIAGNOSIS — M25.552 HIP PAIN, LEFT: Primary | ICD-10-CM

## 2022-03-10 PROCEDURE — 99213 OFFICE O/P EST LOW 20 MIN: CPT | Performed by: PHYSICIAN ASSISTANT

## 2022-03-10 RX ORDER — TRAMADOL HYDROCHLORIDE 50 MG/1
50 TABLET ORAL EVERY 6 HOURS PRN
Qty: 20 TABLET | Refills: 0 | Status: SHIPPED | OUTPATIENT
Start: 2022-03-10 | End: 2022-03-13

## 2022-03-10 ASSESSMENT — ENCOUNTER SYMPTOMS: LEG PAIN: 1

## 2022-03-10 ASSESSMENT — PAIN SCALES - GENERAL: PAINLEVEL: MODERATE PAIN (4)

## 2022-03-10 NOTE — PROGRESS NOTES
ASSESSMENT & PLAN    # Left Hip/Leg Pain:  Symptoms noted over the pasty 1+ years with pain over the lateral anterior hip with symptoms going down past her knee into her posterior leg.  No numbness or tingling or significant back pain.  She does have mild pain over the sciatic notch.  Reviewed previous MRIs showing arthritis of the hip joint.  Lumbar x-ray today showing movement of L4 over L5.  Concerned that symptoms may be due to lumbar radiculopathy.  Counseled patient on treatment options including continue physical therapy, oral anti-inflammatories, lumbar MRI for possible epidural steroid junction.  Given this plan to treat as below and follow-up if not improving.  Image Findings: lumbar x-ray showing lumbar segment movement L4 over L5  Treatment: Activities as tolerated, continue home exercise, hold for physical therapy until pain is better  Medications/Injections: Medrol Dosepak ordered today, defer steroid injections for now  Follow-up: In 1 to 2 weeks after finishing steroid if symptoms do not improve, sooner if worsening  Can consider lumbar MRI for possible epidural steroid injection.  Will order medication prior to MRI given the history of claustrophobia    -----    SUBJECTIVE:  Migdalia Escalera is a 66 year old female who is seen in follow-up for left hip pain.They were last seen 2/10/2022.  The patient is seen by themselves.    Since their last visit reports slight improvement, 10% better.  They indicate that their current pain level is 4/10. They have tried physical therapy (4 visits since last office visit). She thinks physical therapy is helping, but notes she is sore after physical therapy.  No N/T or low back pain.      Patient's past medical, surgical, social, and family histories were reviewed today and no changes are noted.    REVIEW OF SYSTEMS:  Constitutional: NEGATIVE for fever, chills, change in weight  Skin: NEGATIVE for worrisome rashes, moles or lesions  GI/: NEGATIVE for bowel or  "bladder changes  Neuro: NEGATIVE for weakness, dizziness or paresthesias    OBJECTIVE:  BP (!) 157/82   Ht 1.664 m (5' 5.5\")   Wt 103.8 kg (228 lb 12.8 oz)   BMI 37.50 kg/m     General: healthy, alert and in no distress  HEENT: no scleral icterus or conjunctival erythema  Skin: no suspicious lesions or rash. No jaundice.  CV: regular rhythm by palpation, no pedal edema  Resp: normal respiratory effort without conversational dyspnea   Psych: normal mood and affect  Gait: normal steady gait with appropriate coordination and balance  Neuro: normal light touch sensory exam of the extremities.    MSK:    LEFT HIP  Inspection:    No swelling, bruising, discoloration, or obvious deformity or asymmetry  Palpation:    Tender about the anterior groin/joint line and greater trochanteric region. Otherwise all other landmarks are nontender.    Crepitus is Absent  Active Range of Motion:     Flexion full, extension full / IR full / ER  full  Strength:    Flexion 5/5 / extension 5/5 / adduction 5/5 / abduction 5/5  Special Tests:    Positive: OLENA pain over lateral hip    Negative: Logroll, anterior impingement (FADIR)    THORACIC/LUMBAR SPINE  Inspection:    No redness, swelling, overlying skin change, gross deformity/asymmetry, scapular winging  Palpation:    Tender about the left sciatic notch. Otherwise remainder of landmarks are nontender.  Range of Motion:     Lumbar flexion full    Lumbar extension full    Right side bend full    Left side bend full    Right rotation full    Left rotation full  Strength:    5/5 - quadriceps, hamstrings, tibialis anterior, gastrocsoleus, and extensor hallicus longus  Special Tests:    Positive: None  Negative: straight leg raise (bilateral), slump test (bilateral)          Independent visualization of the below image:  Lumbar x-ray anterolisthesis L4 over L5 and L5 over S1 grade 1   XR LUMBAR SPINE 2-3 VIEWS 3/15/2022 3:42 PM      COMPARISON: None     HISTORY: left lumbar radiculopathy; " Left lumbar radiculopathy                                                                      IMPRESSION: 5 lumbar type vertebrae. Grade 1 degenerative  anterolisthesis of L4 upon L5 and L5 upon S1. Alignment otherwise  normal. Vertebral body heights normal. No fractures. Facet arthropathy  at all levels of the lumbar spine with the exception of the L1-2  level.     Impression:  1. Left hip osteoarthritis with associated full-thickness chondral  loss with subchondral edema-like marrow signal intensities, moderate  hip joint effusion.  2. Tendinosis of the adductor arnulfo with peritendinous edema at the  ischial attachment.   3. Iliopsoas tendinosis.  4. Adductor muscle strain.     LORENZO Eugene MD, [facility]

## 2022-03-10 NOTE — PROGRESS NOTES
Assessment & Plan     Hip pain, left  Most of the appointment time was taken for review of treatments and testing.   She was scheduled with Orthopedics for follow up. She was not sure where to go.   I have prescribed some pain medication to help at night since she is having a difficult time with sleep due to pain.   Handicapped Parking Permit also completed for up to 6 months.   She was able to get an appointment next week and will see what the next step in treatment is for her.   - traMADol (ULTRAM) 50 MG tablet; Take 1 tablet (50 mg) by mouth every 6 hours as needed for severe pain                 Return in about 5 days (around 3/15/2022) for specialty.    Kristen M. Kehr, PA-C  [facility]   Migdalia is a 66 year old who presents for the following health issues     Migdalia is here today for continued left hip pain and bursitis. She is working with Orthopedics and has had an injection and is going to Physical Therapy.   She does not feel that there has been success with the injections.   She is walking with a cane and also requesting Handicapped Parking Permit.     Leg Pain    History of Present Illness       Reason for visit:  Left leg pain  Symptom onset:  More than a month  Symptom intensity:  Moderate  Symptom progression:  Staying the same  Had these symptoms before:  Yes  Has tried/received treatment for these symptoms:  Yes  Previous treatment was successful:  No  What makes it worse:  Walking  What makes it better:  Sitting              Review of Systems   Constitutional, HEENT, cardiovascular, pulmonary, GI, , musculoskeletal, neuro, skin, endocrine and psych systems are negative, except as otherwise noted.      Objective    /76   Pulse 74   Temp 97.6  F (36.4  C) (Tympanic)   SpO2 95%   There is no height or weight on file to calculate BMI.  Physical Exam   GENERAL: healthy, alert and no distress  MS: walking with cane, limping.   PSYCH: mentation appears  normal, affect normal/bright

## 2022-03-15 ENCOUNTER — OFFICE VISIT (OUTPATIENT)
Dept: ORTHOPEDICS | Facility: CLINIC | Age: 67
End: 2022-03-15
Payer: COMMERCIAL

## 2022-03-15 ENCOUNTER — THERAPY VISIT (OUTPATIENT)
Dept: PHYSICAL THERAPY | Facility: CLINIC | Age: 67
End: 2022-03-15
Payer: COMMERCIAL

## 2022-03-15 ENCOUNTER — ANCILLARY PROCEDURE (OUTPATIENT)
Dept: GENERAL RADIOLOGY | Facility: CLINIC | Age: 67
End: 2022-03-15
Attending: FAMILY MEDICINE
Payer: COMMERCIAL

## 2022-03-15 VITALS
HEIGHT: 66 IN | SYSTOLIC BLOOD PRESSURE: 157 MMHG | WEIGHT: 228.8 LBS | BODY MASS INDEX: 36.77 KG/M2 | DIASTOLIC BLOOD PRESSURE: 82 MMHG

## 2022-03-15 DIAGNOSIS — M54.16 LEFT LUMBAR RADICULOPATHY: Primary | ICD-10-CM

## 2022-03-15 DIAGNOSIS — M67.952 TENDINOPATHY OF LEFT GLUTEAL REGION: ICD-10-CM

## 2022-03-15 DIAGNOSIS — M54.16 LEFT LUMBAR RADICULOPATHY: ICD-10-CM

## 2022-03-15 DIAGNOSIS — M25.552 HIP PAIN, LEFT: ICD-10-CM

## 2022-03-15 PROCEDURE — 72100 X-RAY EXAM L-S SPINE 2/3 VWS: CPT | Performed by: RADIOLOGY

## 2022-03-15 PROCEDURE — 97530 THERAPEUTIC ACTIVITIES: CPT | Mod: GP | Performed by: PHYSICAL THERAPIST

## 2022-03-15 PROCEDURE — 97140 MANUAL THERAPY 1/> REGIONS: CPT | Mod: GP | Performed by: PHYSICAL THERAPIST

## 2022-03-15 PROCEDURE — 99214 OFFICE O/P EST MOD 30 MIN: CPT | Performed by: FAMILY MEDICINE

## 2022-03-15 PROCEDURE — 97110 THERAPEUTIC EXERCISES: CPT | Mod: GP | Performed by: PHYSICAL THERAPIST

## 2022-03-15 RX ORDER — METHYLPREDNISOLONE 4 MG
TABLET, DOSE PACK ORAL
Qty: 21 TABLET | Refills: 0 | Status: SHIPPED | OUTPATIENT
Start: 2022-03-15 | End: 2024-01-11

## 2022-03-15 ASSESSMENT — ACTIVITIES OF DAILY LIVING (ADL)
WALKING_15_MINUTES_OR_GREATER: MODERATE DIFFICULTY
GOING_DOWN_1_FLIGHT_OF_STAIRS: SLIGHT DIFFICULTY
HOS_ADL_SCORE(%): 55.88
STEPPING_UP_AND_DOWN_CURBS: SLIGHT DIFFICULTY
TWISTING/PIVOTING_ON_INVOLVED_LEG: EXTREME DIFFICULTY
STANDING_FOR_15_MINUTES: MODERATE DIFFICULTY
GETTING_INTO_AND_OUT_OF_A_BATHTUB: NO DIFFICULTY AT ALL
PUTTING_ON_SOCKS_AND_SHOES: EXTREME DIFFICULTY
ROLLING_OVER_IN_BED: MODERATE DIFFICULTY
SITTING_FOR_15_MINUTES: NO DIFFICULTY AT ALL
HOS_ADL_HIGHEST_POTENTIAL_SCORE: 68
WALKING_APPROXIMATELY_10_MINUTES: MODERATE DIFFICULTY
WALKING_DOWN_STEEP_HILLS: MODERATE DIFFICULTY
DEEP_SQUATTING: MODERATE DIFFICULTY
WALKING_INITIALLY: MODERATE DIFFICULTY
GETTING_INTO_AND_OUT_OF_AN_AVERAGE_CAR: SLIGHT DIFFICULTY
HEAVY_WORK: MODERATE DIFFICULTY
LIGHT_TO_MODERATE_WORK: SLIGHT DIFFICULTY
HOW_WOULD_YOU_RATE_YOUR_CURRENT_LEVEL_OF_FUNCTION_DURING_YOUR_USUAL_ACTIVITIES_OF_DAILY_LIVING_FROM_0_TO_100_WITH_100_BEING_YOUR_LEVEL_OF_FUNCTION_PRIOR_TO_YOUR_HIP_PROBLEM_AND_0_BEING_THE_INABILITY_TO_PERFORM_ANY_OF_YOUR_USUAL_DAILY_ACTIVITIES?: 30
HOS_ADL_COUNT: 17
RECREATIONAL_ACTIVITIES: EXTREME DIFFICULTY
GOING_UP_1_FLIGHT_OF_STAIRS: SLIGHT DIFFICULTY
WALKING_UP_STEEP_HILLS: EXTREME DIFFICULTY
HOS_ADL_ITEM_SCORE_TOTAL: 38

## 2022-03-15 NOTE — LETTER
3/15/2022         RE: Migdalia Escalera  [address] [place] [facility] [address]        Dear Colleague,    Thank you for referring your patient, Migdalia Escalera, to the [facility]. Please see a copy of my visit note below.    ASSESSMENT & PLAN    # Left Hip/Leg Pain:  Symptoms noted over the pasty 1+ years with pain over the lateral anterior hip with symptoms going down past her knee into her posterior leg.  No numbness or tingling or significant back pain.  She does have mild pain over the sciatic notch.  Reviewed previous MRIs showing arthritis of the hip joint.  Lumbar x-ray today showing movement of L4 over L5.  Concerned that symptoms may be due to lumbar radiculopathy.  Counseled patient on treatment options including continue physical therapy, oral anti-inflammatories, lumbar MRI for possible epidural steroid junction.  Given this plan to treat as below and follow-up if not improving.  Image Findings: lumbar x-ray showing lumbar segment movement L4 over L5  Treatment: Activities as tolerated, continue home exercise, hold for physical therapy until pain is better  Medications/Injections: Medrol Dosepak ordered today, defer steroid injections for now  Follow-up: In 1 to 2 weeks after finishing steroid if symptoms do not improve, sooner if worsening  Can consider lumbar MRI for possible epidural steroid injection.  Will order medication prior to MRI given the history of claustrophobia    -----    SUBJECTIVE:  Migdalia Escalera is a 66 year old female who is seen in follow-up for left hip pain.They were last seen 2/10/2022.  The patient is seen by themselves.    Since their last visit reports slight improvement, 10% better.  They indicate that their current pain level is 4/10. They have tried physical therapy (4 visits since last office visit). She thinks physical therapy is helping, but notes she is sore after physical therapy.  No N/T or low back pain.      Patient's past  "medical, surgical, social, and family histories were reviewed today and no changes are noted.    REVIEW OF SYSTEMS:  Constitutional: NEGATIVE for fever, chills, change in weight  Skin: NEGATIVE for worrisome rashes, moles or lesions  GI/: NEGATIVE for bowel or bladder changes  Neuro: NEGATIVE for weakness, dizziness or paresthesias    OBJECTIVE:  BP (!) 157/82   Ht 1.664 m (5' 5.5\")   Wt 103.8 kg (228 lb 12.8 oz)   BMI 37.50 kg/m     General: healthy, alert and in no distress  HEENT: no scleral icterus or conjunctival erythema  Skin: no suspicious lesions or rash. No jaundice.  CV: regular rhythm by palpation, no pedal edema  Resp: normal respiratory effort without conversational dyspnea   Psych: normal mood and affect  Gait: normal steady gait with appropriate coordination and balance  Neuro: normal light touch sensory exam of the extremities.    MSK:    LEFT HIP  Inspection:    No swelling, bruising, discoloration, or obvious deformity or asymmetry  Palpation:    Tender about the anterior groin/joint line and greater trochanteric region. Otherwise all other landmarks are nontender.    Crepitus is Absent  Active Range of Motion:     Flexion full, extension full / IR full / ER  full  Strength:    Flexion 5/5 / extension 5/5 / adduction 5/5 / abduction 5/5  Special Tests:    Positive: OLENA pain over lateral hip    Negative: Logroll, anterior impingement (FADIR)    THORACIC/LUMBAR SPINE  Inspection:    No redness, swelling, overlying skin change, gross deformity/asymmetry, scapular winging  Palpation:    Tender about the left sciatic notch. Otherwise remainder of landmarks are nontender.  Range of Motion:     Lumbar flexion full    Lumbar extension full    Right side bend full    Left side bend full    Right rotation full    Left rotation full  Strength:    5/5 - quadriceps, hamstrings, tibialis anterior, gastrocsoleus, and extensor hallicus longus  Special Tests:    Positive: None  Negative: straight leg raise " (bilateral), slump test (bilateral)          Independent visualization of the below image:  Lumbar x-ray anterolisthesis L4 over L5 and L5 over S1 grade 1   XR LUMBAR SPINE 2-3 VIEWS 3/15/2022 3:42 PM      COMPARISON: None     HISTORY: left lumbar radiculopathy; Left lumbar radiculopathy                                                                      IMPRESSION: 5 lumbar type vertebrae. Grade 1 degenerative  anterolisthesis of L4 upon L5 and L5 upon S1. Alignment otherwise  normal. Vertebral body heights normal. No fractures. Facet arthropathy  at all levels of the lumbar spine with the exception of the L1-2  level.     Impression:  1. Left hip osteoarthritis with associated full-thickness chondral  loss with subchondral edema-like marrow signal intensities, moderate  hip joint effusion.  2. Tendinosis of the adductor arnulfo with peritendinous edema at the  ischial attachment.   3. Iliopsoas tendinosis.  4. Adductor muscle strain.     LORENZO Eugene MD, [facility]        Again, thank you for allowing me to participate in the care of your patient.        Sincerely,        Ryan Eugene MD

## 2022-03-15 NOTE — PROGRESS NOTES
PROGRESS  REPORT    Progress reporting period is from 2/16/22 to 3/15/22.       SUBJECTIVE  Subjective changes noted by patient: Patient reports symptoms are about the same. Still having good days and bad.    Current pain level is 0/10 (0/10 at rest, 3/10 with movement).     Previous pain level was 8/10.   Changes in function:  None  Adverse reaction to treatment or activity: None    OBJECTIVE  Changes noted in objective findings: Hip PROM flexion 110, IR 15, ER 30 - all with increased sharp pain in hip/anterior thigh. Gait remains limited - decreased hip extension, decreased loading on L LE.     ASSESSMENT/PLAN  Updated problem list and treatment plan: Diagnosis 1:  L hip pain  Pain -  hot/cold therapy, manual therapy, self management, education and home program  Decreased ROM/flexibility - manual therapy, therapeutic exercise and home program  Decreased strength - therapeutic exercise, therapeutic activities and home program  Impaired gait - gait training and home program  Impaired muscle performance - neuro re-education and home program  Decreased function - therapeutic activities and home program  STG/LTGs have been met or progress has been made towards goals:  None  Assessment of Progress: The patient's condition has potential to improve.  The patient's condition is unchanged.  Self Management Plans:  Patient has been instructed in a home treatment program.  I have re-evaluated this patient and find that the nature, scope, duration and intensity of the therapy is appropriate for the medical condition of the patient.  Migdalia continues to require the following intervention to meet STG and LTG's:  PT    Recommendations:  This patient would benefit from continued therapy.     Frequency:  1 X week, once daily  Duration:  for 1 weeks tapering to 2 X a month over 8 weeks (complete original plan of care)        Please refer to the daily flowsheet for treatment today, total treatment time and time spent performing 1:1  timed codes.

## 2022-03-15 NOTE — PATIENT INSTRUCTIONS
# Left Hip/Leg Pain:  Symptoms noted over the pasty 1+ years with pain over the lateral anterior hip with symptoms going down past her knee into her posterior leg.  No numbness or tingling or significant back pain.  She does have mild pain over the sciatic notch.  Reviewed previous MRIs showing arthritis of the hip joint.  Lumbar x-ray today showing movement of L4 over L5.  Concerned that symptoms may be due to lumbar radiculopathy.  Counseled patient on treatment options including continue physical therapy, oral anti-inflammatories, lumbar MRI for possible epidural steroid junction.  Given this plan to treat as below and follow-up if not improving.  Image Findings: lumbar x-ray showing lumbar segment movement L4 over L5  Treatment: Activities as tolerated, continue home exercise, hold for physical therapy until pain is better  Medications/Injections: Medrol Dosepak ordered today, defer steroid injections for now  Follow-up: In 1 to 2 weeks after finishing steroid if symptoms do not improve, sooner if worsening  Can consider lumbar MRI for possible epidural steroid injection.  Will order medication prior to MRI given the history of claustrophobia    Please call [phone]   Ask for my team if you have any questions or concerns    If you have not yet received the influenza vaccine but would like to get one, please call  [phone] or you can schedule via [employer]    It was great seeing you again today!    Ryan Eugene MD, [facility]

## 2022-03-31 NOTE — PROGRESS NOTES
ASSESSMENT & PLAN    Migdalia was seen today for pain.    Diagnoses and all orders for this visit:    Left lumbar radiculopathy  -     MRI Lumbar spine w/o contrast; Future  -     diazepam (VALIUM) 5 MG tablet; Take 1 tablet (5 mg) by mouth as needed for anxiety      # Left Lumbar Radiculopathy:  Symptoms noted over the pasty 1+ years with pain over the lateral anterior hip with symptoms going down past her knee into her posterior leg.  No numbness or tingling but has some left low back pain to buttock.  She still has pain over the sciatic notch.  Reviewed previous MRIs showing arthritis of the hip joint. Lumbar x-ray today showing movement of L4 over L5.  Concerned that symptoms may be due to lumbar radiculopathy.  Counseled patient on treatment options including continue physical therapy, oral anti-inflammatories, lumbar MRI for possible epidural steroid junction.  Oral steroid helped symptoms a fair amount.  Given this plan to treat as below and follow-up if not improving.  Image Findings: lumbar x-ray showing lumbar segment movement L4 over L5  Treatment: Activities as tolerated, continue home exercise and PT, lumbar MRI ordered Valium take 30 min prior to scan  Medications/Injections: Aleve as needed for the next couple of weeks, defer steroid injections for now  Follow-up: Consider lumbar epidural steroid injection pending MRI results.  Will call 2-3 days after scan  Please call [phone] to schedule phone visit  Ask for my team if you have any questions or concerns    -----    SUBJECTIVE:  Migdalia Escalera is a 66 year old female who is seen in follow-up for left lateral hip pain and left lumbar pain.They were last seen 3/15/2022, she has finished her oral steroids and the steroid had helped. She believes she finished them a week ago. The patient is seen by themselves.    Since their last visit reports a slight improvement in pain. She has been trying not to use her cane. Pain is located over the medial upper  leg and pain over there lateral left hip has improved. They indicate that their current pain level is 3/10. They have tried oral steroids in the past and takes Aleve now prn. Is currently on antibiotics for an abscess tooth and finishes those tomorrow.        Patient's past medical, surgical, social, and family histories were reviewed today and no changes are noted.    REVIEW OF SYSTEMS:  Constitutional: NEGATIVE for fever, chills, change in weight  Skin: NEGATIVE for worrisome rashes, moles or lesions  GI/: NEGATIVE for bowel or bladder changes  Neuro: NEGATIVE for weakness, dizziness or paresthesias    OBJECTIVE:  /68   Wt 103.8 kg (228 lb 12.8 oz)   BMI 37.50 kg/m     General: healthy, alert and in no distress  HEENT: no scleral icterus or conjunctival erythema  Skin: no suspicious lesions or rash. No jaundice.  CV: regular rhythm by palpation, no pedal edema  Resp: normal respiratory effort without conversational dyspnea   Psych: normal mood and affect  Gait: normal steady gait with appropriate coordination and balance  Neuro: normal light touch sensory exam of the extremities.    MSK:    THORACIC/LUMBAR SPINE  Inspection:    No redness, swelling, overlying skin change, gross deformity/asymmetry, scapular winging  Palpation:    Tender about the left sciatic notch. Otherwise remainder of landmarks are nontender.  Range of Motion:     Lumbar flexion full    Lumbar extension full    Right side bend full    Left side bend full    Right rotation full    Left rotation full  Strength:    5/5 - quadriceps, hamstrings, tibialis anterior, gastrocsoleus, and extensor hallicus longus  Special Tests:    Positive: straight leg raise (left), slump test (left)  Negative: straight leg raise (right), slump test (right)        Independent visualization of the below image:  Reviewed hip and lumbar x-rays and hip MRI     Ryan Eugene MD, [facility]

## 2022-04-07 ENCOUNTER — OFFICE VISIT (OUTPATIENT)
Dept: ORTHOPEDICS | Facility: CLINIC | Age: 67
End: 2022-04-07
Payer: COMMERCIAL

## 2022-04-07 VITALS — DIASTOLIC BLOOD PRESSURE: 68 MMHG | WEIGHT: 228.8 LBS | SYSTOLIC BLOOD PRESSURE: 116 MMHG | BODY MASS INDEX: 37.5 KG/M2

## 2022-04-07 DIAGNOSIS — M54.16 LEFT LUMBAR RADICULOPATHY: Primary | ICD-10-CM

## 2022-04-07 PROCEDURE — 99214 OFFICE O/P EST MOD 30 MIN: CPT | Performed by: FAMILY MEDICINE

## 2022-04-07 RX ORDER — DIAZEPAM 5 MG
5 TABLET ORAL PRN
Qty: 1 TABLET | Refills: 0 | Status: SHIPPED | OUTPATIENT
Start: 2022-04-07 | End: 2024-01-11

## 2022-04-07 RX ORDER — CLINDAMYCIN HCL 300 MG
CAPSULE ORAL
COMMUNITY
Start: 2022-03-25 | End: 2024-01-11

## 2022-04-07 ASSESSMENT — PAIN SCALES - GENERAL: PAINLEVEL: MILD PAIN (3)

## 2022-04-07 NOTE — PATIENT INSTRUCTIONS
# Left Lumbar Radiculopathy:  Symptoms noted over the pasty 1+ years with pain over the lateral anterior hip with symptoms going down past her knee into her posterior leg.  No numbness or tingling but has some left low back pain to buttock.  She still has pain over the sciatic notch.  Reviewed previous MRIs showing arthritis of the hip joint. Lumbar x-ray today showing movement of L4 over L5.  Concerned that symptoms may be due to lumbar radiculopathy.  Counseled patient on treatment options including continue physical therapy, oral anti-inflammatories, lumbar MRI for possible epidural steroid junction.  Oral steroid helped symptoms a fair amount.  Given this plan to treat as below and follow-up if not improving.  Image Findings: lumbar x-ray showing lumbar segment movement L4 over L5  Treatment: Activities as tolerated, continue home exercise and PT, lumbar MRI ordered Valium take 30 min prior to scan  Medications/Injections: Aleve as needed for the next couple of weeks, defer steroid injections for now  Follow-up: Consider lumbar epidural steroid injection pending MRI results.  Will call 2-3 days after scan  Please call [phone] to schedule phone visit  Ask for my team if you have any questions or concerns    MRI Scheduling Appointments  Call: [phone]  Toll-Free: [phone]  Fax: [phone]    If you have not yet received the influenza vaccine but would like to get one, please call  [phone] or you can schedule via [employer]    It was great seeing you again today!    Ryan Eugene MD, [facility]

## 2022-04-07 NOTE — LETTER
4/7/2022         RE: Migdalia Escalera  [address] [address]        Dear Colleague,    Thank you for referring your patient, Migdalia Escalera, to the [facility]. Please see a copy of my visit note below.    ASSESSMENT & PLAN    Migdalia was seen today for pain.    Diagnoses and all orders for this visit:    Left lumbar radiculopathy  -     MRI Lumbar spine w/o contrast; Future  -     diazepam (VALIUM) 5 MG tablet; Take 1 tablet (5 mg) by mouth as needed for anxiety      # Left Lumbar Radiculopathy:  Symptoms noted over the pasty 1+ years with pain over the lateral anterior hip with symptoms going down past her knee into her posterior leg.  No numbness or tingling but has some left low back pain to buttock.  She still has pain over the sciatic notch.  Reviewed previous MRIs showing arthritis of the hip joint. Lumbar x-ray today showing movement of L4 over L5.  Concerned that symptoms may be due to lumbar radiculopathy.  Counseled patient on treatment options including continue physical therapy, oral anti-inflammatories, lumbar MRI for possible epidural steroid junction.  Oral steroid helped symptoms a fair amount.  Given this plan to treat as below and follow-up if not improving.  Image Findings: lumbar x-ray showing lumbar segment movement L4 over L5  Treatment: Activities as tolerated, continue home exercise and PT, lumbar MRI ordered Valium take 30 min prior to scan  Medications/Injections: Aleve as needed for the next couple of weeks, defer steroid injections for now  Follow-up: Consider lumbar epidural steroid injection pending MRI results.  Will call 2-3 days after scan  Please call [phone] to schedule phone visit  Ask for my team if you have any questions or concerns    -----    SUBJECTIVE:  Migdalia Escalera is a 66 year old female who is seen in follow-up for left lateral hip pain and left lumbar pain.They were last seen 3/15/2022, she has finished her  oral steroids and the steroid had helped. She believes she finished them a week ago. The patient is seen by themselves.    Since their last visit reports a slight improvement in pain. She has been trying not to use her cane. Pain is located over the medial upper leg and pain over there lateral left hip has improved. They indicate that their current pain level is 3/10. They have tried oral steroids in the past and takes Aleve now prn. Is currently on antibiotics for an abscess tooth and finishes those tomorrow.        Patient's past medical, surgical, social, and family histories were reviewed today and no changes are noted.    REVIEW OF SYSTEMS:  Constitutional: NEGATIVE for fever, chills, change in weight  Skin: NEGATIVE for worrisome rashes, moles or lesions  GI/: NEGATIVE for bowel or bladder changes  Neuro: NEGATIVE for weakness, dizziness or paresthesias    OBJECTIVE:  /68   Wt 103.8 kg (228 lb 12.8 oz)   BMI 37.50 kg/m     General: healthy, alert and in no distress  HEENT: no scleral icterus or conjunctival erythema  Skin: no suspicious lesions or rash. No jaundice.  CV: regular rhythm by palpation, no pedal edema  Resp: normal respiratory effort without conversational dyspnea   Psych: normal mood and affect  Gait: normal steady gait with appropriate coordination and balance  Neuro: normal light touch sensory exam of the extremities.    MSK:    THORACIC/LUMBAR SPINE  Inspection:    No redness, swelling, overlying skin change, gross deformity/asymmetry, scapular winging  Palpation:    Tender about the left sciatic notch. Otherwise remainder of landmarks are nontender.  Range of Motion:     Lumbar flexion full    Lumbar extension full    Right side bend full    Left side bend full    Right rotation full    Left rotation full  Strength:    5/5 - quadriceps, hamstrings, tibialis anterior, gastrocsoleus, and extensor hallicus longus  Special Tests:    Positive: straight leg raise (left), slump test  (left)  Negative: straight leg raise (right), slump test (right)        Independent visualization of the below image:  Reviewed hip and lumbar x-rays and hip MRI     Ryan Eugene MD, [facility]        Again, thank you for allowing me to participate in the care of your patient.        Sincerely,        Ryan Eugene MD

## 2022-04-22 ENCOUNTER — ANCILLARY PROCEDURE (OUTPATIENT)
Dept: MRI IMAGING | Facility: CLINIC | Age: 67
End: 2022-04-22
Attending: FAMILY MEDICINE
Payer: COMMERCIAL

## 2022-04-22 DIAGNOSIS — M54.16 LEFT LUMBAR RADICULOPATHY: ICD-10-CM

## 2022-04-22 PROCEDURE — 72148 MRI LUMBAR SPINE W/O DYE: CPT | Mod: TC | Performed by: RADIOLOGY

## 2022-04-27 ENCOUNTER — TELEPHONE (OUTPATIENT)
Dept: ORTHOPEDICS | Facility: CLINIC | Age: 67
End: 2022-04-27
Payer: COMMERCIAL

## 2022-04-27 DIAGNOSIS — M54.16 LEFT LUMBAR RADICULOPATHY: Primary | ICD-10-CM

## 2022-04-27 NOTE — TELEPHONE ENCOUNTER
[facility] Call Center    Phone Message    May a detailed message be left on voicemail: yes     Reason for Call: Other: Pt calling in re: MRI results of Lumbar spine - Dr. Eugene ordered.  Offered phone visit with Dr. Eugene on 05/05.  Pt declined.  Wanted something sooner or call back from care team.  Pt can be reached at [phone]     Action Taken: Message routed to:  [facility] Sports Medicine - Dr. Eugene    Travel Screening: Not Applicable

## 2022-04-28 NOTE — CONFIDENTIAL NOTE
Patient contacted via telephone regarding lumbar MRI results.  Given findings plan to treat with lumbar epidural steriod injection.  If pain improves can start PT. Orders placed today.       Left VM 4/29/2022 and 5/2/22 to call       Ryan Eugene MD

## 2022-04-28 NOTE — TELEPHONE ENCOUNTER
Reason for Call:  Request for results:    Name of test or procedure: MRI Lumbar Spine    Date of test of procedure: 4/22/22    Results:   Results for orders placed or performed in visit on 04/22/22   MRI Lumbar spine w/o contrast    Narrative    MRI LUMBAR SPINE WITHOUT CONTRAST   4/22/2022 3:22 PM     HISTORY: Low back pain, > 6 weeks; left lumbar radiculopathy.    TECHNIQUE: Multiplanar multisequence MRI of the lumbar spine without  contrast.    COMPARISON: X-rays 3/15/2022.     FINDINGS: Alignment is significant for mild thoracolumbar scoliosis  with levoconvex curvature of lumbar spine, grade 1 anterolisthesis of  L4 on L5, and grade 1 anterolisthesis of L5 on S1. Bone marrow  demonstrates moderate marrow heterogeneity with patchy area of  nonspecific T1 hypointense signal within the L5 vertebral body.  Degenerative edema surrounds the bilateral L4-L5 facet joints. Conus  medullaris is unremarkable terminating at the level of the L1-L2 disc.  Cauda equina is unremarkable. T2 hyperintense lesion within the kidney  measuring approximately 2 cm is incompletely characterized but  statistically likely benign cyst. Smaller similar-appearing lesions  within the bilateral inferior kidneys (ultrasound correlation could be  performed if indicated). Nonspecific fluid signal in the region of the  right adnexa, incompletely characterized. Sigmoid diverticulosis.    Segmental Analysis:     T12-L1:  Disc height maintained. No herniation. Normal facet joints.  No foraminal or spinal canal stenosis.     L1-L2:  Disc height maintained. No herniation. Normal facet joints. No  foraminal or spinal canal stenosis.      L2-L3:  Mild disc height loss. Disc bulge, slightly asymmetric to the  left. Mild bilateral facet arthropathy. Mild bilateral foraminal  stenosis. Mild if any spinal canal stenosis.      L3-L4:  Mild disc height loss. Disc bulge with right foraminal  protrusion. Mild bilateral facet arthropathy. Severe right  foraminal  stenosis. Mild to moderate left foraminal stenosis. Mild spinal canal  stenosis, right greater than left lateral recess stenosis.    L4-L5:  Moderate disc height loss. Disc bulge with uncovering. Severe  bilateral facet arthropathy. Severe bilateral foraminal stenosis. Mild  to moderate spinal canal stenosis with bilateral lateral recess  stenosis.      L5-S1:  Mild disc height loss. Disc bulge with uncovering. Severe  bilateral facet arthropathy. No right foraminal stenosis. Left  foraminal stenosis. No spinal canal stenosis.      Impression    IMPRESSION:    1. Degenerative change and stenoses as detailed, worst at L3-L4,  L4-L5, and L5-S1.  2. Nonspecific heterogeneous marrow signal including patchy T1  hypointense signal within the L5 vertebral body. Marrow replacement  process cannot be excluded. Further workup could be pursued.     CHELSEA ORTEGA MD         SYSTEM ID:  [id number]       Plan for results from last OV: call patient    OK to leave the result message on voice mail or with a family member? YES    Phone number Patient can be reached at:  Home number on file [phone] (home)    Raz Yoo ATC

## 2022-05-02 ENCOUNTER — TELEPHONE (OUTPATIENT)
Dept: ORTHOPEDICS | Facility: CLINIC | Age: 67
End: 2022-05-02
Payer: COMMERCIAL

## 2022-05-02 NOTE — TELEPHONE ENCOUNTER
[facility] Call Center    Phone Message    May a detailed message be left on voicemail: yes     Reason for Call: Other: Pt has recvd calls from Dr Eugene but she is at work please call work number she will be there until 5pm today she is waiting for MRI results      Action Taken: Other: ortho    Travel Screening: Not Applicable

## 2022-05-02 NOTE — TELEPHONE ENCOUNTER
Spoke with Dr. Eugene. He is calling Migdalia back with her results at her work number.     Vivian Paul [facility], [facility]

## 2022-05-07 ENCOUNTER — ANCILLARY PROCEDURE (OUTPATIENT)
Dept: MAMMOGRAPHY | Facility: CLINIC | Age: 67
End: 2022-05-07
Payer: COMMERCIAL

## 2022-05-07 DIAGNOSIS — Z12.31 VISIT FOR SCREENING MAMMOGRAM: ICD-10-CM

## 2022-05-07 PROCEDURE — 77063 BREAST TOMOSYNTHESIS BI: CPT | Mod: TC | Performed by: RADIOLOGY

## 2022-05-07 PROCEDURE — 77067 SCR MAMMO BI INCL CAD: CPT | Mod: TC | Performed by: RADIOLOGY

## 2022-05-26 ENCOUNTER — RADIOLOGY INJECTION OFFICE VISIT (OUTPATIENT)
Dept: PALLIATIVE MEDICINE | Facility: CLINIC | Age: 67
End: 2022-05-26
Attending: FAMILY MEDICINE
Payer: COMMERCIAL

## 2022-05-26 VITALS — DIASTOLIC BLOOD PRESSURE: 77 MMHG | HEART RATE: 80 BPM | SYSTOLIC BLOOD PRESSURE: 160 MMHG

## 2022-05-26 DIAGNOSIS — M54.16 LUMBAR RADICULOPATHY: ICD-10-CM

## 2022-05-26 DIAGNOSIS — M54.16 LEFT LUMBAR RADICULOPATHY: ICD-10-CM

## 2022-05-26 PROCEDURE — 62323 NJX INTERLAMINAR LMBR/SAC: CPT | Performed by: PAIN MEDICINE

## 2022-05-26 RX ORDER — TRIAMCINOLONE ACETONIDE 40 MG/ML
40 INJECTION, SUSPENSION INTRA-ARTICULAR; INTRAMUSCULAR ONCE
Status: COMPLETED | OUTPATIENT
Start: 2022-05-26 | End: 2022-05-26

## 2022-05-26 RX ADMIN — TRIAMCINOLONE ACETONIDE 40 MG: 40 INJECTION, SUSPENSION INTRA-ARTICULAR; INTRAMUSCULAR at 14:49

## 2022-05-26 ASSESSMENT — PAIN SCALES - GENERAL
PAINLEVEL: NO PAIN (1)
PAINLEVEL: NO PAIN (1)

## 2022-05-26 NOTE — PATIENT INSTRUCTIONS
[facility]   Procedure Discharge Instructions    Today you saw:  Dr. Elton Gresham    You had an: Interlaminar Lumbar Epidural steroid injection       Medications used:  Lidocaine   Bupivacaine  Omnipaque    Kenalog   Normal saline          Be cautious when walking. Numbness and/or weakness in the lower extremities may occur for up to 6-8 hours after the procedure due to effect of the local anesthetic  Do not drive for 6 hours. The effect of the local anesthetic could slow your reflexes.   You may resume your regular activities after 24 hours  Avoid strenuous activity for the first 24 hours  You may shower, however avoid swimming, tub baths or hot tubs for 24 hours following your procedure  You may have a mild to moderate increase in pain for several days following the injection.  It may take up to 14 days for the steroid medication to start working although you may feel the effect as early as a few days after the procedure.     You may use ice packs for 10-15 minutes, 3 to 4 times a day at the injection site for comfort  Do not use heat to painful areas for 6 to 8 hours. This will give the local anesthetic time to wear off and prevent you from accidentally burning your skin.   Unless you have been directed to avoid the use of anti-inflammatory medications (NSAIDS), you may use medications such as ibuprofen, Aleve or Tylenol for pain control if needed.   If you were fasting, you may resume your normal diet and medications after the procedure  If you have diabetes, check your blood sugar more frequently than usual as your blood sugar may be higher than normal for 10-14 days following a steroid injection. Contact your doctor who manages your diabetes if your blood sugar is higher than usual  Possible side effects of steroids that you may experience include flushing, elevated blood pressure, increased appetite, mild headaches and restlessness.  All of these symptoms will get better with  time.  If you experience any of the following, call the Pain Clinic during work hours (Mon-Friday 8-4:30 pm) at [phone] or the Provider Line after hours at [phone]:  -Fever over 100 degree F  -Swelling, bleeding, redness, drainage, warmth at the injection site  -Progressive weakness or numbness in your legs   -Loss of bowel or bladder function  -Unusual headache that is not relieved by Tylenol or other pain reliever  -Unusual new onset of pain that is not improving

## 2022-05-26 NOTE — PROGRESS NOTES
Pre procedure Diagnosis: Lumbar radiculopathy  Post procedure Diagnosis: Same  Procedure performed: L4-5 interlaminar epidural steroid injection   Anesthesia: none  Complications: none  Operators: Elton Gresham MD     Indications:   Migdalia Escalera is a 67 year old female.  The patient has a history of lbp radiating to her LLE.  Examination shows neg slump.  she has tried conservative treatment including mefs/pt.    MRI reviewed  Options/alternatives, benefits and risks were discussed with the patient including but not limited to bleeding, infection, no pain relief, tissue trauma, exposure to radiation, reaction to medications, spinal cord injury, dural puncture, weakness, numbness and headache.  Questions were answered to her satisfaction and she wishes to proceed. Voluntary informed consent was obtained and signed.     Vitals were reviewed: Yes  Allergies were reviewed:  Yes  Medications were reviewed:  Yes   Pre-procedure pain score: 1/10    Procedure:  The patient's medical history, medications, and allergies were reviewed and reconciled.  After obtaining signed informed consent, the patient was brought into the procedure suite and was placed in a prone position on the procedure table.   A Pause for the Cause was performed.  Patient was prepped and draped in the usual sterile fashion.     The L4-5 interspace was identified with AP fluoroscopy.  A total of 4ml of 1% lidocaine was used to anesthetize the skin and subcutaneous tissues for a  midline approach.    A 20gauge 3.5inch Touhy needle was advanced utilizing intermittent AP and Lateral fluoroscopy and air for loss of resistance.  The epidural space was encountered on the first pass without difficulties.  Aspiration for blood and CSF was negative.  Needle position was verified by injecting 1 ml of Omnipaque utilizing real-time fluoroscopy that showed good needle placement and epidural spread without signs of intravascular or intrathecal uptake.  Omnipaque  wasted:  9 ml.    Then, after repeated negative aspiration for blood or CSF, a combination of Kenalog 40 mg, Bupivicaine 0.25% 2 ml, diluted with 3 ml of normal saline to a total injectate volume of 6 ml was injected into the epidural space in a slow and incremental fashion and the needle was restyletted and withdrawn.  All injected medications were preservative free.    The injection site was cleaned and a sterile dressing was applied.    The patient tolerated the procedure well without complications and was taken to the recovery room for continued observation.    Images were saved to PACS.    Post-procedure pain score: 2/10  Follow-up includes:   -f/u with referring provider     Elton Gresham MD  [facility] [place]

## 2022-05-26 NOTE — NURSING NOTE
Discharge Information    IV Discontiued Time:  NA    Amount of Fluid Infused:  NA    Discharge Criteria = When patient returns to baseline or as per MD order    Consciousness:  Pt is fully awake    Circulation:  BP +/- 20% of pre-procedure level    Respiration:  Patient is able to breathe deeply    O2 Sat:  Patient is able to maintain O2 Sat >92% on room air    Activity:  Moves 4 extremities on command    Ambulation:  Patient is able to stand and walk or stand and pivot into wheelchair    Dressing:  Clean/dry or No Dressing    Notes:   Discharge instructions and AVS given to patient    Patient meets criteria for discharge?  YES    Admitted to PCU?  No    Responsible adult present to accompany patient home?  Yes    Signature/Title:    Subha Quinn RN  RN Care Coordinator  [facility] [place]

## 2022-05-26 NOTE — NURSING NOTE
Pre-procedure Intake  If YES to any questions or NO to having a   Please complete laminated checklist and leave on the computer keyboard for Provider, verbally inform provider if able.    For SCS Trial, RFA's or any sedation procedure:  Have you been fasting? NA    If yes, for how long?     Are you taking any any blood thinners such as Coumadin, Warfarin, Jantoven, Pradaxa Xarelto, Eliquis, Edoxaban, Enoxaparin, Lovenox, Heparin, Arixtra, Fondaparinux, or Fragmin? OR Antiplatelet medication such as Plavix, Brilinta, or Effient?   No     If yes, when did you take your last dose?     Do you take aspirin?  Yes -   ASA    If cervical procedure, have you held aspirin for 6 days?   Yes    Do you have any allergies to contrast dye, iodine, steroid and/or numbing medications?  NO    Are you currently taking antibiotics or have an active infection?  NO    Have you had a fever/elevated temperature within the past week? NO    Are you currently taking oral steroids? NO    Do you have a ? Yes    Are you pregnant or breastfeeding?  NO    Have you received the COVID-19 vaccine? No     If yes, was it your 1st, 2nd or only dose needed?     Date of most recent vaccine:     Notify provider and RNs if systolic BP >170, diastolic BP >100, P >100 or O2 sats < 90%    Saadia Gibbs MA  [facility]

## 2022-07-07 ENCOUNTER — OFFICE VISIT (OUTPATIENT)
Dept: FAMILY MEDICINE | Facility: CLINIC | Age: 67
End: 2022-07-07
Payer: COMMERCIAL

## 2022-07-07 VITALS
HEART RATE: 73 BPM | SYSTOLIC BLOOD PRESSURE: 130 MMHG | DIASTOLIC BLOOD PRESSURE: 72 MMHG | TEMPERATURE: 97.2 F | BODY MASS INDEX: 37.69 KG/M2 | OXYGEN SATURATION: 97 % | WEIGHT: 230 LBS

## 2022-07-07 DIAGNOSIS — I10 ESSENTIAL HYPERTENSION WITH GOAL BLOOD PRESSURE LESS THAN 140/90: Primary | ICD-10-CM

## 2022-07-07 DIAGNOSIS — E78.5 HYPERLIPIDEMIA LDL GOAL <130: ICD-10-CM

## 2022-07-07 DIAGNOSIS — E03.9 HYPOTHYROIDISM, UNSPECIFIED TYPE: ICD-10-CM

## 2022-07-07 PROCEDURE — 99213 OFFICE O/P EST LOW 20 MIN: CPT | Performed by: PHYSICIAN ASSISTANT

## 2022-07-07 RX ORDER — LOSARTAN POTASSIUM 100 MG/1
100 TABLET ORAL DAILY
Qty: 90 TABLET | Refills: 1 | Status: SHIPPED | OUTPATIENT
Start: 2022-07-07 | End: 2023-01-05

## 2022-07-07 RX ORDER — HYDROCHLOROTHIAZIDE 25 MG/1
25 TABLET ORAL DAILY
Qty: 90 TABLET | Refills: 1 | Status: SHIPPED | OUTPATIENT
Start: 2022-07-07 | End: 2023-01-05

## 2022-07-07 RX ORDER — AMLODIPINE BESYLATE 5 MG/1
5 TABLET ORAL DAILY
Qty: 90 TABLET | Refills: 1 | Status: SHIPPED | OUTPATIENT
Start: 2022-07-07 | End: 2023-01-05

## 2022-07-07 RX ORDER — METOPROLOL SUCCINATE 100 MG/1
100 TABLET, EXTENDED RELEASE ORAL DAILY
Qty: 90 TABLET | Refills: 1 | Status: SHIPPED | OUTPATIENT
Start: 2022-07-07 | End: 2023-01-05

## 2022-07-07 RX ORDER — SIMVASTATIN 40 MG
40 TABLET ORAL AT BEDTIME
Qty: 90 TABLET | Refills: 1 | Status: SHIPPED | OUTPATIENT
Start: 2022-07-07 | End: 2023-01-05

## 2022-07-07 ASSESSMENT — PAIN SCALES - GENERAL: PAINLEVEL: NO PAIN (0)

## 2022-07-07 NOTE — PROGRESS NOTES
"  Assessment & Plan     Essential hypertension with goal blood pressure less than 140/90  Stable, well controlled with her current dose of medications.   Refills given   Plan fasting lab appointment and office visit in 6 months  - amLODIPine (NORVASC) 5 MG tablet; Take 1 tablet (5 mg) by mouth daily  - losartan (COZAAR) 100 MG tablet; Take 1 tablet (100 mg) by mouth daily  - metoprolol succinate ER (TOPROL XL) 100 MG 24 hr tablet; Take 1 tablet (100 mg) by mouth daily  - hydrochlorothiazide (HYDRODIURIL) 25 MG tablet; Take 1 tablet (25 mg) by mouth daily  - Comprehensive metabolic panel (BMP + Alb, Alk Phos, ALT, AST, Total. Bili, TP); Future    Hyperlipidemia LDL goal <130  Refills given   Appointment in 6 months  - simvastatin (ZOCOR) 40 MG tablet; Take 1 tablet (40 mg) by mouth At Bedtime  - Lipid panel reflex to direct LDL Fasting; Future    Hypothyroidism, unspecified type  Labs ordered for 6 months.   She already has refills of her medication  - TSH with free T4 reflex; Future             BMI:   Estimated body mass index is 37.69 kg/m  as calculated from the following:    Height as of 3/15/22: 1.664 m (5' 5.5\").    Weight as of this encounter: 104.3 kg (230 lb).   Weight management plan: Discussed healthy diet and exercise guidelines        Return in about 6 months (around 1/7/2023) for fasting lab appointment, medication check.    Kristen M. Kehr, PA-C [facility]   [place] is a 67 year old, presenting for the following health issues:  Hypertension  Hyperlpidemia    She continues with her medications.  amlodpine  Metoprolol  Losartan  hydrochlorothiazide  Simvastatin    History of Present Illness       Reason for visit:  Blood pressure check    She eats 2-3 servings of fruits and vegetables daily.She consumes 1 sweetened beverage(s) daily.She exercises with enough effort to increase her heart rate 9 or less minutes per day.    She is taking medications regularly.   "           Review of Systems   Constitutional, HEENT, cardiovascular, pulmonary, GI, , musculoskeletal, neuro, skin, endocrine and psych systems are negative, except as otherwise noted.      Objective    /72   Pulse 73   Temp 97.2  F (36.2  C) (Tympanic)   Wt 104.3 kg (230 lb)   SpO2 97%   BMI 37.69 kg/m    Body mass index is 37.69 kg/m .  Physical Exam   GENERAL: healthy, alert and no distress  RESP: lungs clear to auscultation - no rales, rhonchi or wheezes  CV: regular rate and rhythm, normal S1 S2, no S3 or S4, no murmur, click or rub, no peripheral edema and peripheral pulses strong  MS: no gross musculoskeletal defects noted, no edema  SKIN: no suspicious lesions or rashes  PSYCH: mentation appears normal, affect normal/bright    Office Visit on 01/05/2022   Component Date Value Ref Range Status     TSH 01/05/2022 1.49  0.40 - 4.00 mU/L Final     Cholesterol 01/05/2022 222 (A) <200 mg/dL Final     Triglycerides 01/05/2022 169 (A) <150 mg/dL Final     Direct Measure HDL 01/05/2022 81  >=50 mg/dL Final     LDL Cholesterol Calculated 01/05/2022 107 (A) <=100 mg/dL Final     Non HDL Cholesterol 01/05/2022 141 (A) <130 mg/dL Final     Patient Fasting > 8hrs? 01/05/2022 Yes   Final     Sodium 01/05/2022 139  133 - 144 mmol/L Final     Potassium 01/05/2022 4.1  3.4 - 5.3 mmol/L Final     Chloride 01/05/2022 103  94 - 109 mmol/L Final     Carbon Dioxide (CO2) 01/05/2022 29  20 - 32 mmol/L Final     Anion Gap 01/05/2022 7  3 - 14 mmol/L Final     Urea Nitrogen 01/05/2022 23  7 - 30 mg/dL Final     Creatinine 01/05/2022 1.01  0.52 - 1.04 mg/dL Final     Calcium 01/05/2022 9.1  8.5 - 10.1 mg/dL Final     Glucose 01/05/2022 109 (A) 70 - 99 mg/dL Final     Alkaline Phosphatase 01/05/2022 80  40 - 150 U/L Final     AST 01/05/2022 21  0 - 45 U/L Final     ALT 01/05/2022 29  0 - 50 U/L Final     Protein Total 01/05/2022 7.8  6.8 - 8.8 g/dL Final     Albumin 01/05/2022 3.8  3.4 - 5.0 g/dL Final     Bilirubin Total  01/05/2022 0.7  0.2 - 1.3 mg/dL Final     GFR Estimate 01/05/2022 60 (A) >60 mL/min/1.73m2 Final    Effective December 21, 2021 eGFRcr in adults is calculated using the 2021 CKD-EPI creatinine equation which includes age and gender (Sandy et al., NEJM, DOI: 10.1056/ZIFQhg5599927)                   .  ..

## 2022-07-07 NOTE — NURSING NOTE
"Chief Complaint   Patient presents with     Hypertension       Initial /72   Pulse 73   Temp 97.2  F (36.2  C) (Tympanic)   Wt 104.3 kg (230 lb)   SpO2 97%   BMI 37.69 kg/m   Estimated body mass index is 37.69 kg/m  as calculated from the following:    Height as of 3/15/22: 1.664 m (5' 5.5\").    Weight as of this encounter: 104.3 kg (230 lb).  Medication Reconciliation: complete    HORTENCIA Garcia MA    "

## 2022-11-27 DIAGNOSIS — R06.2 WHEEZING: ICD-10-CM

## 2022-11-27 DIAGNOSIS — R06.02 SOB (SHORTNESS OF BREATH): ICD-10-CM

## 2022-11-28 RX ORDER — ALBUTEROL SULFATE 90 UG/1
AEROSOL, METERED RESPIRATORY (INHALATION)
Qty: 18 G | Refills: 0 | OUTPATIENT
Start: 2022-11-28

## 2022-11-28 NOTE — TELEPHONE ENCOUNTER
This medication was prescribed in Urgent Care for acute illness.   She does not need this long term.   Kristen Kehr PA-C

## 2022-12-29 ENCOUNTER — LAB (OUTPATIENT)
Dept: LAB | Facility: CLINIC | Age: 67
End: 2022-12-29
Payer: COMMERCIAL

## 2022-12-29 DIAGNOSIS — I10 ESSENTIAL HYPERTENSION WITH GOAL BLOOD PRESSURE LESS THAN 140/90: ICD-10-CM

## 2022-12-29 DIAGNOSIS — E78.5 HYPERLIPIDEMIA LDL GOAL <130: ICD-10-CM

## 2022-12-29 DIAGNOSIS — E03.9 HYPOTHYROIDISM, UNSPECIFIED TYPE: ICD-10-CM

## 2022-12-29 LAB
ALBUMIN SERPL-MCNC: 3.6 G/DL (ref 3.4–5)
ALP SERPL-CCNC: 96 U/L (ref 40–150)
ALT SERPL W P-5'-P-CCNC: 25 U/L (ref 0–50)
ANION GAP SERPL CALCULATED.3IONS-SCNC: 6 MMOL/L (ref 3–14)
AST SERPL W P-5'-P-CCNC: 23 U/L (ref 0–45)
BILIRUB SERPL-MCNC: 0.5 MG/DL (ref 0.2–1.3)
BUN SERPL-MCNC: 23 MG/DL (ref 7–30)
CALCIUM SERPL-MCNC: 9.2 MG/DL (ref 8.5–10.1)
CHLORIDE BLD-SCNC: 107 MMOL/L (ref 94–109)
CHOLEST SERPL-MCNC: 208 MG/DL
CO2 SERPL-SCNC: 28 MMOL/L (ref 20–32)
CREAT SERPL-MCNC: 0.99 MG/DL (ref 0.52–1.04)
FASTING STATUS PATIENT QL REPORTED: YES
GFR SERPL CREATININE-BSD FRML MDRD: 61 ML/MIN/1.73M2
GLUCOSE BLD-MCNC: 121 MG/DL (ref 70–99)
HDLC SERPL-MCNC: 84 MG/DL
LDLC SERPL CALC-MCNC: 98 MG/DL
NONHDLC SERPL-MCNC: 124 MG/DL
POTASSIUM BLD-SCNC: 4.1 MMOL/L (ref 3.4–5.3)
PROT SERPL-MCNC: 7.3 G/DL (ref 6.8–8.8)
SODIUM SERPL-SCNC: 141 MMOL/L (ref 133–144)
TRIGL SERPL-MCNC: 131 MG/DL
TSH SERPL DL<=0.005 MIU/L-ACNC: 2.15 MU/L (ref 0.4–4)

## 2022-12-29 PROCEDURE — 84443 ASSAY THYROID STIM HORMONE: CPT

## 2022-12-29 PROCEDURE — 36415 COLL VENOUS BLD VENIPUNCTURE: CPT

## 2022-12-29 PROCEDURE — 80053 COMPREHEN METABOLIC PANEL: CPT

## 2022-12-29 PROCEDURE — 80061 LIPID PANEL: CPT

## 2023-01-05 ENCOUNTER — OFFICE VISIT (OUTPATIENT)
Dept: FAMILY MEDICINE | Facility: CLINIC | Age: 68
End: 2023-01-05
Payer: COMMERCIAL

## 2023-01-05 VITALS
WEIGHT: 230 LBS | TEMPERATURE: 97.2 F | OXYGEN SATURATION: 96 % | SYSTOLIC BLOOD PRESSURE: 134 MMHG | DIASTOLIC BLOOD PRESSURE: 76 MMHG | RESPIRATION RATE: 16 BRPM | HEIGHT: 66 IN | HEART RATE: 74 BPM | BODY MASS INDEX: 36.96 KG/M2

## 2023-01-05 DIAGNOSIS — R06.2 WHEEZING: ICD-10-CM

## 2023-01-05 DIAGNOSIS — R73.03 PREDIABETES: ICD-10-CM

## 2023-01-05 DIAGNOSIS — I10 ESSENTIAL HYPERTENSION WITH GOAL BLOOD PRESSURE LESS THAN 140/90: ICD-10-CM

## 2023-01-05 DIAGNOSIS — Z00.00 MEDICARE ANNUAL WELLNESS VISIT, SUBSEQUENT: Primary | ICD-10-CM

## 2023-01-05 DIAGNOSIS — E66.01 MORBID OBESITY (H): ICD-10-CM

## 2023-01-05 DIAGNOSIS — Z12.31 ENCOUNTER FOR SCREENING MAMMOGRAM FOR BREAST CANCER: ICD-10-CM

## 2023-01-05 DIAGNOSIS — E03.8 OTHER SPECIFIED HYPOTHYROIDISM: ICD-10-CM

## 2023-01-05 DIAGNOSIS — E78.5 HYPERLIPIDEMIA LDL GOAL <130: ICD-10-CM

## 2023-01-05 PROCEDURE — 99214 OFFICE O/P EST MOD 30 MIN: CPT | Mod: 25 | Performed by: PHYSICIAN ASSISTANT

## 2023-01-05 PROCEDURE — 99207 PR ANNUAL WELLNESS VISIT, PPS, INITIAL STAT: CPT | Performed by: PHYSICIAN ASSISTANT

## 2023-01-05 RX ORDER — SIMVASTATIN 40 MG
40 TABLET ORAL AT BEDTIME
Qty: 90 TABLET | Refills: 3 | Status: SHIPPED | OUTPATIENT
Start: 2023-01-05 | End: 2024-01-11

## 2023-01-05 RX ORDER — METOPROLOL SUCCINATE 100 MG/1
100 TABLET, EXTENDED RELEASE ORAL DAILY
Qty: 90 TABLET | Refills: 3 | Status: SHIPPED | OUTPATIENT
Start: 2023-01-05 | End: 2024-01-11

## 2023-01-05 RX ORDER — LEVOTHYROXINE SODIUM 125 UG/1
125 TABLET ORAL DAILY
Qty: 90 TABLET | Refills: 3 | Status: SHIPPED | OUTPATIENT
Start: 2023-01-05 | End: 2024-01-11

## 2023-01-05 RX ORDER — HYDROCHLOROTHIAZIDE 25 MG/1
25 TABLET ORAL DAILY
Qty: 90 TABLET | Refills: 3 | Status: SHIPPED | OUTPATIENT
Start: 2023-01-05 | End: 2024-01-11

## 2023-01-05 RX ORDER — LOSARTAN POTASSIUM 100 MG/1
100 TABLET ORAL DAILY
Qty: 90 TABLET | Refills: 3 | Status: SHIPPED | OUTPATIENT
Start: 2023-01-05 | End: 2024-01-11

## 2023-01-05 RX ORDER — ALBUTEROL SULFATE 90 UG/1
AEROSOL, METERED RESPIRATORY (INHALATION)
Qty: 18 G | Refills: 3 | Status: SHIPPED | OUTPATIENT
Start: 2023-01-05 | End: 2024-06-13

## 2023-01-05 RX ORDER — AMLODIPINE BESYLATE 5 MG/1
5 TABLET ORAL DAILY
Qty: 90 TABLET | Refills: 3 | Status: SHIPPED | OUTPATIENT
Start: 2023-01-05 | End: 2024-01-11

## 2023-01-05 ASSESSMENT — PAIN SCALES - GENERAL: PAINLEVEL: NO PAIN (0)

## 2023-01-05 NOTE — PROGRESS NOTES
"  Assessment & Plan     Medicare annual wellness visit, subsequent  Health maintenance reviewed and updated.  Defers immunizations.   Mammogram scheduled.     Essential hypertension with goal blood pressure less than 140/90  Stable, refills given   - amLODIPine (NORVASC) 5 MG tablet; Take 1 tablet (5 mg) by mouth daily  - hydrochlorothiazide (HYDRODIURIL) 25 MG tablet; Take 1 tablet (25 mg) by mouth daily  - losartan (COZAAR) 100 MG tablet; Take 1 tablet (100 mg) by mouth daily  - metoprolol succinate ER (TOPROL XL) 100 MG 24 hr tablet; Take 1 tablet (100 mg) by mouth daily    Other specified hypothyroidism  Lab tests reviewed.   Stable, refills given  - levothyroxine (SYNTHROID/LEVOTHROID) 125 MCG tablet; Take 1 tablet (125 mcg) by mouth daily    Morbid obesity (H)  Prediabetes  She will work on healthy changes with diet, exercise, weight loss.         Hyperlipidemia LDL goal <130  Stable, refills given   - simvastatin (ZOCOR) 40 MG tablet; Take 1 tablet (40 mg) by mouth At Bedtime    Wheezing  Used with upper respiratory infections as needed.   Refills given   - albuterol (VENTOLIN HFA) 108 (90 Base) MCG/ACT inhaler; INHALE 2 PUFFS BY INHALATION ROUTE EVERY 4 HOURS AS NEEDED FOR SHORTNESS OF BREATH / DYSPNEA OR WHEEZING. USE WITH SPACER. Strength: 108 (90 Base) MCG/ACT    Encounter for screening mammogram for breast cancer  - *MA Screening Digital Bilateral; Future             BMI:   Estimated body mass index is 37.41 kg/m  as calculated from the following:    Height as of this encounter: 1.67 m (5' 5.75\").    Weight as of this encounter: 104.3 kg (230 lb).   Weight management plan: Discussed healthy diet and exercise guidelines        Return in about 1 year (around 1/5/2024) for Routine Visit, medication check.    Kristen M. Kehr, PA-C  [facility]   Migdalia is a 67 year old, presenting for the following health issues:  Hypertension      HPI     Hypertension, hypothyroid, " "hyperlipidemia Follow-up      Do you check your blood pressure regularly outside of the clinic? No     Are you following a low salt diet? Tried too    Are your blood pressures ever more than 140 on the top number (systolic) OR more   than 90 on the bottom number (diastolic), for example 140/90?     Annual Wellness Visit    Patient has been advised of split billing requirements and indicates understanding: Yes     Are you in the first 12 months of your Medicare Part B coverage?  No    Physical Health:    In general, how would you rate your overall physical health? good    Outside of work, how many days during the week do you exercise?    Outside of work, approximately how many minutes a day do you exercise?    If you drink alcohol do you typically have >3 drinks per day or >7 drinks per week? No    Do you usually eat at least 4 servings of fruit and vegetables a day, include whole grains & fiber and avoid regularly eating high fat or \"junk\" foods? Yes    Do you have any problems taking medications regularly? YES    Do you have any side effects from medications? none    Needs assistance for the following daily activities: no assistance needed    Which of the following safety concerns are present in your home?  none identified     Hearing impairment: No    In the past 6 months, have you been bothered by leaking of urine? no    Mental Health:    In general, how would you rate your overall mental or emotional health? good  PHQ-2 Score: 0    Do you feel safe in your environment? Yes    Have you ever done Advance Care Planning? (For example, a Health Directive, POLST, or a discussion with a medical provider or your loved ones about your wishes)? No, advance care planning information given to patient to review.  Patient declined advance care planning discussion at this time.    Fall risk:  Fallen 2 or more times in the past year?: No  Any fall with injury in the past year?: No    Cognitive Screening: Cognitive Screening " "Cognition normal during direct observation during interview and exam      Do you have sleep apnea, excessive snoring or daytime drowsiness?: no    Current providers sharing in care for this patient include:   Patient Care Team:  Kehr, Kristen M, PA-C as PCP - General (Physician Assistant - Medical)  Kehr, Kristen M, PA-C as Assigned PCP  Ryan Eugene MD as Assigned Musculoskeletal Provider    Patient has been advised of split billing requirements and indicates understanding: Yes    Review of Systems   Constitutional, HEENT, cardiovascular, pulmonary, GI, , musculoskeletal, neuro, skin, endocrine and psych systems are negative, except as otherwise noted.      Objective    /76   Pulse 74   Temp 97.2  F (36.2  C) (Tympanic)   Resp 16   Ht 1.67 m (5' 5.75\")   Wt 104.3 kg (230 lb)   SpO2 96%   BMI 37.41 kg/m    Body mass index is 37.41 kg/m .  Physical Exam   GENERAL: healthy, alert and no distress  RESP: lungs clear to auscultation - no rales, rhonchi or wheezes  CV: regular rate and rhythm, normal S1 S2, no S3 or S4, no murmur, click or rub, no peripheral edema and peripheral pulses strong  MS: no gross musculoskeletal defects noted, no edema  SKIN: no suspicious lesions or rashes  NEURO: Normal strength and tone, mentation intact and speech normal  PSYCH: mentation appears normal, affect normal/bright    Lab on 12/29/2022   Component Date Value Ref Range Status     TSH 12/29/2022 2.15  0.40 - 4.00 mU/L Final     Sodium 12/29/2022 141  133 - 144 mmol/L Final     Potassium 12/29/2022 4.1  3.4 - 5.3 mmol/L Final     Chloride 12/29/2022 107  94 - 109 mmol/L Final     Carbon Dioxide (CO2) 12/29/2022 28  20 - 32 mmol/L Final     Anion Gap 12/29/2022 6  3 - 14 mmol/L Final     Urea Nitrogen 12/29/2022 23  7 - 30 mg/dL Final     Creatinine 12/29/2022 0.99  0.52 - 1.04 mg/dL Final     Calcium 12/29/2022 9.2  8.5 - 10.1 mg/dL Final     Glucose 12/29/2022 121 (H)  70 - 99 mg/dL Final     Alkaline " Phosphatase 12/29/2022 96  40 - 150 U/L Final     AST 12/29/2022 23  0 - 45 U/L Final     ALT 12/29/2022 25  0 - 50 U/L Final     Protein Total 12/29/2022 7.3  6.8 - 8.8 g/dL Final     Albumin 12/29/2022 3.6  3.4 - 5.0 g/dL Final     Bilirubin Total 12/29/2022 0.5  0.2 - 1.3 mg/dL Final     GFR Estimate 12/29/2022 61  >60 mL/min/1.73m2 Final    Effective December 21, 2021 eGFRcr in adults is calculated using the 2021 CKD-EPI creatinine equation which includes age and gender (Sandy et al., NEJM, DOI: 10.1056/CCOHwp2396876)     Cholesterol 12/29/2022 208 (H)  <200 mg/dL Final     Triglycerides 12/29/2022 131  <150 mg/dL Final     Direct Measure HDL 12/29/2022 84  >=50 mg/dL Final     LDL Cholesterol Calculated 12/29/2022 98  <=100 mg/dL Final     Non HDL Cholesterol 12/29/2022 124  <130 mg/dL Final     Patient Fasting > 8hrs? 12/29/2022 Yes   Final

## 2023-01-05 NOTE — NURSING NOTE
"Chief Complaint   Patient presents with     Hypertension       Initial /76   Pulse 74   Temp 97.2  F (36.2  C) (Tympanic)   Resp 16   Ht 1.67 m (5' 5.75\")   Wt 104.3 kg (230 lb)   SpO2 96%   BMI 37.41 kg/m   Estimated body mass index is 37.41 kg/m  as calculated from the following:    Height as of this encounter: 1.67 m (5' 5.75\").    Weight as of this encounter: 104.3 kg (230 lb).  Medication Reconciliation: complete    HORTENCIA Garcia MA    "

## 2023-01-05 NOTE — PATIENT INSTRUCTIONS
Complete Medicare questions    Continue all medications and work on making healthy changes with diet and exercise / weight loss.     Mammogram    Check insurance for coverage of shingles vaccine

## 2023-07-07 ENCOUNTER — ANCILLARY PROCEDURE (OUTPATIENT)
Dept: MAMMOGRAPHY | Facility: CLINIC | Age: 68
End: 2023-07-07
Attending: PHYSICIAN ASSISTANT
Payer: COMMERCIAL

## 2023-07-07 DIAGNOSIS — Z12.31 ENCOUNTER FOR SCREENING MAMMOGRAM FOR BREAST CANCER: ICD-10-CM

## 2023-07-07 PROCEDURE — 77063 BREAST TOMOSYNTHESIS BI: CPT | Mod: TC | Performed by: RADIOLOGY

## 2023-07-07 PROCEDURE — 77067 SCR MAMMO BI INCL CAD: CPT | Mod: TC | Performed by: RADIOLOGY

## 2023-08-10 ENCOUNTER — TELEPHONE (OUTPATIENT)
Dept: FAMILY MEDICINE | Facility: CLINIC | Age: 68
End: 2023-08-10
Payer: COMMERCIAL

## 2023-08-10 NOTE — TELEPHONE ENCOUNTER
Patient Quality Outreach    Patient is due for the following:   Physical Annual Wellness Visit    Next Steps:   Patient was scheduled for 01/11/24 with Kristen Kehr PA-C for a Annual Wellness visit    Type of outreach:    Chart review performed, no outreach needed.      Questions for provider review:    None           Chandan Garcia MA

## 2023-09-14 DIAGNOSIS — R12 HEARTBURN: Primary | ICD-10-CM

## 2023-09-16 RX ORDER — PANTOPRAZOLE SODIUM 40 MG/1
40 TABLET, DELAYED RELEASE ORAL DAILY
Qty: 90 TABLET | Refills: 1 | OUTPATIENT
Start: 2023-09-16

## 2023-09-16 NOTE — TELEPHONE ENCOUNTER
I don't have this medication listed as a long term medication being prescribed from primary care. I am happy to discuss with her regarding this prescription.   Kristen Kehr PA-C

## 2023-11-25 NOTE — NURSING NOTE
"Chief Complaint   Patient presents with     Leg Pain     left       Initial BP (!) 157/84   Pulse 74   Temp 97.6  F (36.4  C) (Tympanic)   SpO2 95%  Estimated body mass index is 37.69 kg/m  as calculated from the following:    Height as of 2/10/22: 1.664 m (5' 5.5\").    Weight as of 2/10/22: 104.3 kg (230 lb).  Medication Reconciliation: complete    HORTENCIA Garcia MA    " No

## 2024-01-11 ENCOUNTER — OFFICE VISIT (OUTPATIENT)
Dept: FAMILY MEDICINE | Facility: CLINIC | Age: 69
End: 2024-01-11
Payer: COMMERCIAL

## 2024-01-11 VITALS
DIASTOLIC BLOOD PRESSURE: 78 MMHG | SYSTOLIC BLOOD PRESSURE: 132 MMHG | HEIGHT: 66 IN | BODY MASS INDEX: 36 KG/M2 | RESPIRATION RATE: 16 BRPM | HEART RATE: 68 BPM | OXYGEN SATURATION: 96 % | TEMPERATURE: 97.6 F | WEIGHT: 224 LBS

## 2024-01-11 DIAGNOSIS — E03.8 OTHER SPECIFIED HYPOTHYROIDISM: ICD-10-CM

## 2024-01-11 DIAGNOSIS — E66.01 MORBID OBESITY (H): ICD-10-CM

## 2024-01-11 DIAGNOSIS — Z00.00 ENCOUNTER FOR MEDICARE ANNUAL WELLNESS EXAM: Primary | ICD-10-CM

## 2024-01-11 DIAGNOSIS — G56.02 CARPAL TUNNEL SYNDROME OF LEFT WRIST: ICD-10-CM

## 2024-01-11 DIAGNOSIS — E78.5 HYPERLIPIDEMIA LDL GOAL <130: ICD-10-CM

## 2024-01-11 DIAGNOSIS — I10 ESSENTIAL HYPERTENSION WITH GOAL BLOOD PRESSURE LESS THAN 140/90: ICD-10-CM

## 2024-01-11 LAB
ALBUMIN SERPL BCG-MCNC: 4 G/DL (ref 3.5–5.2)
ALP SERPL-CCNC: 87 U/L (ref 40–150)
ALT SERPL W P-5'-P-CCNC: 14 U/L (ref 0–50)
ANION GAP SERPL CALCULATED.3IONS-SCNC: 9 MMOL/L (ref 7–15)
AST SERPL W P-5'-P-CCNC: 21 U/L (ref 0–45)
BILIRUB SERPL-MCNC: 0.6 MG/DL
BUN SERPL-MCNC: 16.1 MG/DL (ref 8–23)
CALCIUM SERPL-MCNC: 10.3 MG/DL (ref 8.8–10.2)
CHLORIDE SERPL-SCNC: 103 MMOL/L (ref 98–107)
CHOLEST SERPL-MCNC: 195 MG/DL
CREAT SERPL-MCNC: 0.9 MG/DL (ref 0.51–0.95)
DEPRECATED HCO3 PLAS-SCNC: 29 MMOL/L (ref 22–29)
EGFRCR SERPLBLD CKD-EPI 2021: 68 ML/MIN/1.73M2
FASTING STATUS PATIENT QL REPORTED: YES
GLUCOSE SERPL-MCNC: 103 MG/DL (ref 70–99)
HDLC SERPL-MCNC: 66 MG/DL
LDLC SERPL CALC-MCNC: 111 MG/DL
NONHDLC SERPL-MCNC: 129 MG/DL
POTASSIUM SERPL-SCNC: 4.2 MMOL/L (ref 3.4–5.3)
PROT SERPL-MCNC: 6.8 G/DL (ref 6.4–8.3)
SODIUM SERPL-SCNC: 141 MMOL/L (ref 135–145)
TRIGL SERPL-MCNC: 91 MG/DL
TSH SERPL DL<=0.005 MIU/L-ACNC: 1.88 UIU/ML (ref 0.3–4.2)

## 2024-01-11 PROCEDURE — 80061 LIPID PANEL: CPT | Performed by: PHYSICIAN ASSISTANT

## 2024-01-11 PROCEDURE — 36415 COLL VENOUS BLD VENIPUNCTURE: CPT | Performed by: PHYSICIAN ASSISTANT

## 2024-01-11 PROCEDURE — G0438 PPPS, INITIAL VISIT: HCPCS | Performed by: PHYSICIAN ASSISTANT

## 2024-01-11 PROCEDURE — 84443 ASSAY THYROID STIM HORMONE: CPT | Performed by: PHYSICIAN ASSISTANT

## 2024-01-11 PROCEDURE — 99214 OFFICE O/P EST MOD 30 MIN: CPT | Mod: 25 | Performed by: PHYSICIAN ASSISTANT

## 2024-01-11 PROCEDURE — 80053 COMPREHEN METABOLIC PANEL: CPT | Performed by: PHYSICIAN ASSISTANT

## 2024-01-11 RX ORDER — LEVOTHYROXINE SODIUM 125 UG/1
125 TABLET ORAL DAILY
Qty: 90 TABLET | Refills: 3 | Status: SHIPPED | OUTPATIENT
Start: 2024-01-11

## 2024-01-11 RX ORDER — HYDROCHLOROTHIAZIDE 25 MG/1
25 TABLET ORAL DAILY
Qty: 90 TABLET | Refills: 3 | Status: SHIPPED | OUTPATIENT
Start: 2024-01-11

## 2024-01-11 RX ORDER — AMLODIPINE BESYLATE 5 MG/1
5 TABLET ORAL DAILY
Qty: 90 TABLET | Refills: 3 | Status: SHIPPED | OUTPATIENT
Start: 2024-01-11

## 2024-01-11 RX ORDER — METOPROLOL SUCCINATE 100 MG/1
100 TABLET, EXTENDED RELEASE ORAL DAILY
Qty: 90 TABLET | Refills: 3 | Status: SHIPPED | OUTPATIENT
Start: 2024-01-11

## 2024-01-11 RX ORDER — LOSARTAN POTASSIUM 100 MG/1
100 TABLET ORAL DAILY
Qty: 90 TABLET | Refills: 3 | Status: SHIPPED | OUTPATIENT
Start: 2024-01-11

## 2024-01-11 RX ORDER — SIMVASTATIN 40 MG
40 TABLET ORAL AT BEDTIME
Qty: 90 TABLET | Refills: 3 | Status: SHIPPED | OUTPATIENT
Start: 2024-01-11

## 2024-01-11 ASSESSMENT — ENCOUNTER SYMPTOMS
NAUSEA: 0
HEADACHES: 0
CONSTIPATION: 0
MYALGIAS: 1
SHORTNESS OF BREATH: 0
DIZZINESS: 0
SORE THROAT: 0
BREAST MASS: 0
PARESTHESIAS: 0
EYE PAIN: 0
DYSURIA: 0
HEARTBURN: 1
NERVOUS/ANXIOUS: 0
CHILLS: 0
HEMATOCHEZIA: 0
ARTHRALGIAS: 1
ABDOMINAL PAIN: 0
FEVER: 0
WEAKNESS: 0
DIARRHEA: 0
FREQUENCY: 0
PALPITATIONS: 0
COUGH: 0
HEMATURIA: 0
JOINT SWELLING: 0

## 2024-01-11 ASSESSMENT — ACTIVITIES OF DAILY LIVING (ADL): CURRENT_FUNCTION: NO ASSISTANCE NEEDED

## 2024-01-11 NOTE — PROGRESS NOTES
"SUBJECTIVE:   Migdalia is a 68 year old, presenting for the following:  Wellness Visit        1/11/2024     9:05 AM   Additional Questions   Roomed by Chandan DICKSON MA       Are you in the first 12 months of your Medicare coverage?  No    Healthy Habits:     In general, how would you rate your overall health?  Good    Frequency of exercise:  None    Do you usually eat at least 4 servings of fruit and vegetables a day, include whole grains    & fiber and avoid regularly eating high fat or \"junk\" foods?  Yes    Taking medications regularly:  Yes    Medication side effects:  Not applicable    Ability to successfully perform activities of daily living:  No assistance needed    Home Safety:  No safety concerns identified    Hearing Impairment:  Need to ask people to speak up or repeat themselves    In the past 6 months, have you been bothered by leaking of urine?  No    In general, how would you rate your overall mental or emotional health?  Good    Additional concerns today:  No      Today's PHQ-2 Score:       1/11/2024     9:02 AM   PHQ-2 ( 1999 [employer])   Q1: Little interest or pleasure in doing things 0   Q2: Feeling down, depressed or hopeless 0   PHQ-2 Score 0   Q1: Little interest or pleasure in doing things Not at all   Q2: Feeling down, depressed or hopeless Not at all   PHQ-2 Score 0           Have you ever done Advance Care Planning? (For example, a Health Directive, POLST, or a discussion with a medical provider or your loved ones about your wishes): Yes, advance care planning is on file.      Fall risk  Fallen 2 or more times in the past year?: No  Any fall with injury in the past year?: No    Cognitive Screening Cognitive Screening Cognition normal during direct observation during interview and exam          Reviewed and updated as needed this visit by clinical staff   Tobacco  Allergies  Meds              Reviewed and updated as needed this visit by Provider                 Social History     Tobacco Use    " Smoking status: Former     Types: Cigarettes     Quit date: 2005     Years since quittin.1    Smokeless tobacco: Never    Tobacco comments:     no 2nd hand smoke exposure   Substance Use Topics    Alcohol use: Yes     Comment: 2-3 beers every day and an additional glass of wine             2024     9:01 AM   Alcohol Use   Prescreen: >3 drinks/day or >7 drinks/week? Yes   AUDIT SCORE  5     Do you have a current opioid prescription? No  Do you use any other controlled substances or medications that are not prescribed by a provider? None          PROBLEMS TO ADD ON...  Hypertension: well controlled on medication. She is taking the losartan, metoprolol and amlodipine. She will need refills for the year and lab tests.   Hyperlipidemia: managed with simvastatin 40 mg daily. She recently had an Xray showing arteriolosclerosis in vessels. She does take aspirin also.   Hypothyroid: managed with levothyroxine 125 mcg daily  Carpal tunnel syndrome: she had surgery on the right side. She has numbness / pain on the left. She desires surgery on the left also. She has not been wearing a brace at this time. Symptoms present daily.     Current providers sharing in care for this patient include:   Patient Care Team:  Kehr, Kristen M, PA-C as PCP - General (Physician Assistant - Medical)  Kehr, Kristen M, PA-C as Assigned PCP    The following health maintenance items are reviewed in Epic and correct as of today:  Health Maintenance   Topic Date Due    ANNUAL REVIEW OF  ORDERS  Never done    COVID-19 Vaccine (1) Never done    LUNG CANCER SCREENING  Never done    RSV VACCINE (Pregnancy & 60+) (1 - 1-dose 60+ series) Never done    ZOSTER IMMUNIZATION (2 of 2) 2021    DTAP/TDAP/TD IMMUNIZATION (2 - Td or Tdap) 2023    INFLUENZA VACCINE (1) 2023    CMP  2023    LIPID  2023    TSH W/FREE T4 REFLEX  2023    MEDICARE ANNUAL WELLNESS VISIT  2024    MAMMO SCREENING  2024     COLORECTAL CANCER SCREENING  2024    FALL RISK ASSESSMENT  2025    ADVANCE CARE PLANNING  2028    DEXA  2034    HEPATITIS C SCREENING  Completed    PHQ-2 (once per calendar year)  Completed    Pneumococcal Vaccine: 65+ Years  Addressed    IPV IMMUNIZATION  Aged Out    HPV IMMUNIZATION  Aged Out    MENINGITIS IMMUNIZATION  Aged Out    RSV MONOCLONAL ANTIBODY  Aged Out     BP Readings from Last 3 Encounters:   24 132/78   23 134/76   22 130/72    Wt Readings from Last 3 Encounters:   24 101.6 kg (224 lb)   23 104.3 kg (230 lb)   22 104.3 kg (230 lb)                  Patient Active Problem List   Diagnosis    Hyperlipidemia LDL goal <130    Advanced directives, counseling/discussion    H/O: hysterectomy    Vitamin D deficiency    Hypothyroidism, unspecified hypothyroidism type    History of colonic polyps    Essential hypertension with goal blood pressure less than 140/90    Breast hypertrophy    Obesity (BMI 35.0-39.9) with comorbidity (H)    Asymptomatic postmenopausal status    Hypothyroid    Shortness of breath    Esophageal reflux    Chest pain, unspecified    Recurrent cold sores    Hip pain, left    Prediabetes     Past Surgical History:   Procedure Laterality Date    COLONOSCOPY      COLONOSCOPY WITH CO2 INSUFFLATION N/A 2021    Procedure: COLONOSCOPY, WITH CO2 INSUFFLATION;  Surgeon: Wild Westbrook MD;  Location: [facility] OR    HYSTERECTOMY, PAP NO LONGER INDICATED      SURGICAL HISTORY OF -       thoracic spine-growth       Social History     Tobacco Use    Smoking status: Former     Types: Cigarettes     Quit date: 2005     Years since quittin.1    Smokeless tobacco: Never    Tobacco comments:     no 2nd hand smoke exposure   Substance Use Topics    Alcohol use: Yes     Comment: 2-3 beers every day and an additional glass of wine     Family History   Problem Relation Age of Onset    Arthritis Mother     Prostate Cancer Father      Hypertension Father     Cancer Father         bone    Diabetes Maternal Grandfather     Thyroid Disease Sister          Current Outpatient Medications   Medication Sig Dispense Refill    albuterol (VENTOLIN HFA) 108 (90 Base) MCG/ACT inhaler INHALE 2 PUFFS BY INHALATION ROUTE EVERY 4 HOURS AS NEEDED FOR SHORTNESS OF BREATH / DYSPNEA OR WHEEZING. USE WITH SPACER. Strength: 108 (90 Base) MCG/ACT 18 g 3    amLODIPine (NORVASC) 5 MG tablet Take 1 tablet (5 mg) by mouth daily 90 tablet 3    aspirin 81 MG tablet Take 1 tablet by mouth daily.      CALCIUM PO       hydrochlorothiazide (HYDRODIURIL) 25 MG tablet Take 1 tablet (25 mg) by mouth daily 90 tablet 3    levothyroxine (SYNTHROID/LEVOTHROID) 125 MCG tablet Take 1 tablet (125 mcg) by mouth daily 90 tablet 3    losartan (COZAAR) 100 MG tablet Take 1 tablet (100 mg) by mouth daily 90 tablet 3    metoprolol succinate ER (TOPROL XL) 100 MG 24 hr tablet Take 1 tablet (100 mg) by mouth daily 90 tablet 3    order for DME Equipment being ordered: compression stockings / mild compression 1 Units 3    simvastatin (ZOCOR) 40 MG tablet Take 1 tablet (40 mg) by mouth at bedtime 90 tablet 3    valACYclovir (VALTREX) 1000 mg tablet Take 2 tablets (2,000 mg) by mouth 2 times daily 30 tablet 3     Allergies   Allergen Reactions    Amoxicillin     Erythromycin      Recent Labs   Lab Test 12/29/22  0817 01/05/22  0938 02/04/21  0810 08/11/20  0812   LDL 98 107* 90  --    HDL 84 81 69  --    TRIG 131 169* 265*  --    ALT 25 29 21 26   CR 0.99 1.01 0.82 1.13*   GFRESTIMATED 61 60* 74 50*   GFRESTBLACK  --   --  86 58*   POTASSIUM 4.1 4.1 4.1 4.0   TSH 2.15 1.49 1.66  --               Review of Systems   Constitutional:  Negative for chills and fever.   HENT:  Positive for hearing loss. Negative for congestion, ear pain and sore throat.    Eyes:  Negative for pain and visual disturbance.   Respiratory:  Negative for cough and shortness of breath.    Cardiovascular:  Positive for  "peripheral edema. Negative for chest pain and palpitations.   Gastrointestinal:  Positive for heartburn. Negative for abdominal pain, constipation, diarrhea, hematochezia and nausea.   Breasts:  Negative for tenderness, breast mass and discharge.   Genitourinary:  Negative for dysuria, frequency, genital sores, hematuria, pelvic pain, urgency, vaginal bleeding and vaginal discharge.   Musculoskeletal:  Positive for arthralgias and myalgias. Negative for joint swelling.   Skin:  Negative for rash.   Neurological:  Negative for dizziness, weakness, headaches and paresthesias.   Psychiatric/Behavioral:  Negative for mood changes. The patient is not nervous/anxious.          OBJECTIVE:   /78   Pulse 68   Temp 97.6  F (36.4  C) (Tympanic)   Resp 16   Ht 1.664 m (5' 5.5\")   Wt 101.6 kg (224 lb)   LMP  (LMP Unknown)   SpO2 96%   Breastfeeding No   BMI 36.71 kg/m   Estimated body mass index is 36.71 kg/m  as calculated from the following:    Height as of this encounter: 1.664 m (5' 5.5\").    Weight as of this encounter: 101.6 kg (224 lb).  Physical Exam  GENERAL APPEARANCE: healthy, alert and no distress  EYES: Eyes grossly normal to inspection, PERRL and conjunctivae and sclerae normal  HENT: ear canals and TM's normal, nose and mouth without ulcers or lesions, oropharynx clear and oral mucous membranes moist  NECK: no adenopathy, no asymmetry, masses, or scars and thyroid normal to palpation  RESP: lungs clear to auscultation - no rales, rhonchi or wheezes  CV: regular rate and rhythm, normal S1 S2, no S3 or S4, no murmur, click or rub, no peripheral edema and peripheral pulses strong  ABDOMEN: soft, nontender, no hepatosplenomegaly, no masses and bowel sounds normal  MS: no musculoskeletal defects are noted and gait is age appropriate without ataxia  SKIN: no suspicious lesions or rashes  NEURO: Normal strength and tone, sensory exam grossly normal, mentation intact and speech normal  PSYCH: mentation " appears normal and affect normal/bright    Diagnostic Test Results:  Labs reviewed in Epic    ASSESSMENT / PLAN:   (Z00.00) Encounter for Medicare annual wellness exam  (primary encounter diagnosis)  Comment: Health maintenance reviewed and updated.  Advised to get vaccinations, she defers. If she decides to go forward, then she will go to pharmacy or check coverage.     (I10) Essential hypertension with goal blood pressure less than 140/90  Stable, refills given   Check BMP today.   Plan: COMPREHENSIVE METABOLIC PANEL, amLODIPine         (NORVASC) 5 MG tablet, hydrochlorothiazide         (HYDRODIURIL) 25 MG tablet, losartan (COZAAR)         100 MG tablet, metoprolol succinate ER (TOPROL         XL) 100 MG 24 hr tablet            (E78.5) Hyperlipidemia LDL goal <130  Comment: stable, refills given   Continue to use the statin   Plan: simvastatin (ZOCOR) 40 MG tablet, Lipid panel         reflex to direct LDL Fasting           (E03.8) Other specified hypothyroidism  Comment: will check thyroid function today.   Refills given   Plan: TSH WITH FREE T4 REFLEX, levothyroxine         (SYNTHROID/LEVOTHROID) 125 MCG tablet            (G56.02) Carpal tunnel syndrome of left wrist  Comment: referral for Hand Specialty given   Start wearing her brace consistently.   Plan: Orthopedic  Referral            (E66.01) Morbid obesity (H)  Continue working on healthy habits.           COUNSELING:  Reviewed preventive health counseling, as reflected in patient instructions       Regular exercise       Healthy diet/nutrition       Osteoporosis prevention/bone health       Colon cancer screening        She reports that she quit smoking about 18 years ago. Her smoking use included cigarettes. She has never used smokeless tobacco.      Appropriate preventive services were discussed with this patient, including applicable screening as appropriate for fall prevention, nutrition, physical activity, Tobacco-use cessation, weight loss  and cognition.  Checklist reviewing preventive services available has been given to the patient.    Reviewed patients plan of care and provided an AVS. The Basic Care Plan (routine screening as documented in Health Maintenance) for Migdalia meets the Care Plan requirement. This Care Plan has been established and reviewed with the Patient.          Kristen M. Kehr, PA-C  [facility]    Identified Health Risks:  I have reviewed Opioid Use Disorder and Substance Use Disorder risk factors and made any needed referrals.

## 2024-01-11 NOTE — PATIENT INSTRUCTIONS
Patient Education   Personalized Prevention Plan  You are due for the preventive services outlined below.  Your care team is available to assist you in scheduling these services.  If you have already completed any of these items, please share that information with your care team to update in your medical record.  Health Maintenance Due   Topic Date Due     ANNUAL REVIEW OF HM ORDERS  Never done     COVID-19 Vaccine (1) Never done     LUNG CANCER SCREENING  Never done     RSV VACCINE (Pregnancy & 60+) (1 - 1-dose 60+ series) Never done     Zoster (Shingles) Vaccine (2 of 2) 04/06/2021     Diptheria Tetanus Pertussis (DTAP/TDAP/TD) Vaccine (2 - Td or Tdap) 03/20/2023     Flu Vaccine (1) 09/01/2023     Comprehensive Metabolic Panel  12/29/2023     Cholesterol Lab  12/29/2023     Thyroid Function Lab  12/29/2023     Annual Wellness Visit  01/05/2024      Patient Education   Alcohol Use     Many people can enjoy a glass of wine or beer without any negative consequences to their health. According to the Centers for Disease Control and Prevention (CDC), having one or fewer drinks per day for women and two or fewer per day for men is considered moderate drinking.     When people drink more than moderately, it can become concerning. Excessive drinking is defined as consuming 15 drinks or more per week for men and 8 drinks or more per week for women. There are various health problems associated with excessive drinking, which include:  Damage to vital organs like the heart, brain, liver and pancreas  Harm to the digestive tract  Weaken the immune system  Higher risk for heart disease and cancer    There are many resources available to people who are addicted to alcohol. A counselor or other health care provider can give you support. So can a , , or rabbi who is trained in substance abuse counseling. Friends and family may also help once you are connected with professionals.

## 2024-01-11 NOTE — LETTER
January 12, 2024      Migdalia Escalera  [address] [facility] [address]        Dear ,    Normal results.    Resulted Orders   COMPREHENSIVE METABOLIC PANEL   Result Value Ref Range    Sodium 141 135 - 145 mmol/L      Comment:      Reference intervals for this test were updated on 09/26/2023 to more accurately reflect our healthy population. There may be differences in the flagging of prior results with similar values performed with this method. Interpretation of those prior results can be made in the context of the updated reference intervals.     Potassium 4.2 3.4 - 5.3 mmol/L    Carbon Dioxide (CO2) 29 22 - 29 mmol/L    Anion Gap 9 7 - 15 mmol/L    Urea Nitrogen 16.1 8.0 - 23.0 mg/dL    Creatinine 0.90 0.51 - 0.95 mg/dL    GFR Estimate 68 >60 mL/min/1.73m2    Calcium 10.3 (H) 8.8 - 10.2 mg/dL    Chloride 103 98 - 107 mmol/L    Glucose 103 (H) 70 - 99 mg/dL    Alkaline Phosphatase 87 40 - 150 U/L      Comment:      Reference intervals for this test were updated on 11/14/2023 to more accurately reflect our healthy population. There may be differences in the flagging of prior results with similar values performed with this method. Interpretation of those prior results can be made in the context of the updated reference intervals.    AST 21 0 - 45 U/L      Comment:      Reference intervals for this test were updated on 6/12/2023 to more accurately reflect our healthy population. There may be differences in the flagging of prior results with similar values performed with this method. Interpretation of those prior results can be made in the context of the updated reference intervals.    ALT 14 0 - 50 U/L      Comment:      Reference intervals for this test were updated on 6/12/2023 to more accurately reflect our healthy population. There may be differences in the flagging of prior results with similar values performed with this method. Interpretation of those prior results can be made in the context of  the updated reference intervals.      Protein Total 6.8 6.4 - 8.3 g/dL    Albumin 4.0 3.5 - 5.2 g/dL    Bilirubin Total 0.6 <=1.2 mg/dL   TSH WITH FREE T4 REFLEX   Result Value Ref Range    TSH 1.88 0.30 - 4.20 uIU/mL   Lipid panel reflex to direct LDL Fasting   Result Value Ref Range    Cholesterol 195 <200 mg/dL    Triglycerides 91 <150 mg/dL    Direct Measure HDL 66 >=50 mg/dL    LDL Cholesterol Calculated 111 (H) <=100 mg/dL    Non HDL Cholesterol 129 <130 mg/dL    Patient Fasting > 8hrs? Yes     Narrative    Cholesterol  Desirable:  <200 mg/dL    Triglycerides  Normal:  Less than 150 mg/dL  Borderline High:  150-199 mg/dL  High:  200-499 mg/dL  Very High:  Greater than or equal to 500 mg/dL    Direct Measure HDL  Female:  Greater than or equal to 50 mg/dL   Male:  Greater than or equal to 40 mg/dL    LDL Cholesterol  Desirable:  <100mg/dL  Above Desirable:  100-129 mg/dL   Borderline High:  130-159 mg/dL   High:  160-189 mg/dL   Very High:  >= 190 mg/dL    Non HDL Cholesterol  Desirable:  130 mg/dL  Above Desirable:  130-159 mg/dL  Borderline High:  160-189 mg/dL  High:  190-219 mg/dL  Very High:  Greater than or equal to 220 mg/dL       If you have any questions or concerns, please call the clinic at the number listed above.       Sincerely,      Kristen M Kehr, PA-C

## 2024-01-12 ENCOUNTER — PATIENT OUTREACH (OUTPATIENT)
Dept: GASTROENTEROLOGY | Facility: CLINIC | Age: 69
End: 2024-01-12
Payer: COMMERCIAL

## 2024-01-12 NOTE — PROGRESS NOTES
Letter sent    Karli Dickerson,    [facility]

## 2024-02-12 NOTE — PATIENT INSTRUCTIONS
Schedule mammogram and DEXA    Recheck in 1 month with blood pressure   Spoke to Pharmacist    Per pharmacy Breo 100 no longer available  Breo 200 expensive out of pocket cost   Will cost patient $700 Out of pocket    Routed to MD for alternative

## 2024-04-04 ENCOUNTER — TRANSFERRED RECORDS (OUTPATIENT)
Dept: HEALTH INFORMATION MANAGEMENT | Facility: CLINIC | Age: 69
End: 2024-04-04
Payer: COMMERCIAL

## 2024-04-16 ENCOUNTER — PATIENT OUTREACH (OUTPATIENT)
Dept: GASTROENTEROLOGY | Facility: CLINIC | Age: 69
End: 2024-04-16
Payer: COMMERCIAL

## 2024-04-16 DIAGNOSIS — Z12.11 SPECIAL SCREENING FOR MALIGNANT NEOPLASMS, COLON: Primary | ICD-10-CM

## 2024-04-16 NOTE — LETTER
April 16, 2024      Migdalia Escalera  [address] [address]              Chato Negron,    We hope this letter finds you doing well.     Your health is important to us at [facility]. Our records indicate that you could be due, or possibly overdue, for a screening or surveillance colonoscopy if you have not had a colonoscopy since 7/16/21.     An order has been placed for you to have this completed. Our scheduling team will be reaching out to you to assist in getting this scheduled. If you do not hear from them within 7 days or you would like to schedule sooner, please call [phone], option 2 for endoscopy scheduling.     If you believe this is in error and have already had a colonoscopy done, please have your results and recommendations faxed to [phone].    If you have questions about this order or have further concerns, please reach out to your primary care provider.     Ricky     Colorectal Cancer RN Screening Team  [facility]

## 2024-04-16 NOTE — PROGRESS NOTES
"Patients last colonoscopy on file was on 7/16/21 and was recommended to repeat in 3 years.  CRC Screening Colonoscopy Referral Review    Patient meets the inclusion criteria for screening colonoscopy standing order.    Ordering/Referring Provider:  Kristen Kehr, PA-C    BMI: Estimated body mass index is 36.71 kg/m  as calculated from the following:    Height as of 1/11/24: 1.664 m (5' 5.5\").    Weight as of 1/11/24: 101.6 kg (224 lb).     Sedation:  Does patient have any of the following conditions affecting sedation?  Hx of claustrophobia-MAC recommended    Previous Scopes:  Any previous recommendations or follow up needs based on previous scope?  na / No recommendations.    Medical Concerns to Postpone Order:  Does patient have any of the following medical concerns that should postpone/delay colonoscopy referral?  No medical conditions affecting colonoscopy referral.    Final Referral Details:  Based on patient's medical history patient is appropriate for referral order with MAC/deep sedation.   BMI<= 45 45 < BMI <= 48 48 < BMI < = 50  BMI > 50   No Restrictions No [facility]  No [facility] with exceptions Hospital Only OR Only     "

## 2024-06-13 ENCOUNTER — OFFICE VISIT (OUTPATIENT)
Dept: FAMILY MEDICINE | Facility: CLINIC | Age: 69
End: 2024-06-13
Payer: COMMERCIAL

## 2024-06-13 VITALS
WEIGHT: 226 LBS | SYSTOLIC BLOOD PRESSURE: 132 MMHG | HEIGHT: 66 IN | BODY MASS INDEX: 36.32 KG/M2 | TEMPERATURE: 98.2 F | DIASTOLIC BLOOD PRESSURE: 68 MMHG | OXYGEN SATURATION: 95 % | HEART RATE: 79 BPM | RESPIRATION RATE: 16 BRPM

## 2024-06-13 DIAGNOSIS — Z01.818 PREOP GENERAL PHYSICAL EXAM: Primary | ICD-10-CM

## 2024-06-13 DIAGNOSIS — Z12.31 VISIT FOR SCREENING MAMMOGRAM: ICD-10-CM

## 2024-06-13 DIAGNOSIS — I10 ESSENTIAL HYPERTENSION WITH GOAL BLOOD PRESSURE LESS THAN 140/90: ICD-10-CM

## 2024-06-13 DIAGNOSIS — M16.12 PRIMARY OSTEOARTHRITIS OF LEFT HIP: ICD-10-CM

## 2024-06-13 DIAGNOSIS — E03.8 OTHER SPECIFIED HYPOTHYROIDISM: ICD-10-CM

## 2024-06-13 DIAGNOSIS — E66.01 MORBID OBESITY (H): ICD-10-CM

## 2024-06-13 DIAGNOSIS — E78.5 HYPERLIPIDEMIA LDL GOAL <130: ICD-10-CM

## 2024-06-13 LAB
ANION GAP SERPL CALCULATED.3IONS-SCNC: 12 MMOL/L (ref 7–15)
BASOPHILS # BLD AUTO: 0 10E3/UL (ref 0–0.2)
BASOPHILS NFR BLD AUTO: 1 %
BUN SERPL-MCNC: 24.5 MG/DL (ref 8–23)
CALCIUM SERPL-MCNC: 10.6 MG/DL (ref 8.8–10.2)
CHLORIDE SERPL-SCNC: 103 MMOL/L (ref 98–107)
CREAT SERPL-MCNC: 0.95 MG/DL (ref 0.51–0.95)
DEPRECATED HCO3 PLAS-SCNC: 24 MMOL/L (ref 22–29)
EGFRCR SERPLBLD CKD-EPI 2021: 64 ML/MIN/1.73M2
EOSINOPHIL # BLD AUTO: 0.2 10E3/UL (ref 0–0.7)
EOSINOPHIL NFR BLD AUTO: 3 %
ERYTHROCYTE [DISTWIDTH] IN BLOOD BY AUTOMATED COUNT: 12.9 % (ref 10–15)
GLUCOSE SERPL-MCNC: 99 MG/DL (ref 70–99)
HCT VFR BLD AUTO: 41.2 % (ref 35–47)
HGB BLD-MCNC: 14 G/DL (ref 11.7–15.7)
IMM GRANULOCYTES # BLD: 0 10E3/UL
IMM GRANULOCYTES NFR BLD: 0 %
LYMPHOCYTES # BLD AUTO: 1.5 10E3/UL (ref 0.8–5.3)
LYMPHOCYTES NFR BLD AUTO: 23 %
MCH RBC QN AUTO: 32.2 PG (ref 26.5–33)
MCHC RBC AUTO-ENTMCNC: 34 G/DL (ref 31.5–36.5)
MCV RBC AUTO: 95 FL (ref 78–100)
MONOCYTES # BLD AUTO: 0.5 10E3/UL (ref 0–1.3)
MONOCYTES NFR BLD AUTO: 7 %
NEUTROPHILS # BLD AUTO: 4.2 10E3/UL (ref 1.6–8.3)
NEUTROPHILS NFR BLD AUTO: 66 %
PLATELET # BLD AUTO: 218 10E3/UL (ref 150–450)
POTASSIUM SERPL-SCNC: 4 MMOL/L (ref 3.4–5.3)
RBC # BLD AUTO: 4.35 10E6/UL (ref 3.8–5.2)
SODIUM SERPL-SCNC: 139 MMOL/L (ref 135–145)
TSH SERPL DL<=0.005 MIU/L-ACNC: 1.55 UIU/ML (ref 0.3–4.2)
WBC # BLD AUTO: 6.3 10E3/UL (ref 4–11)

## 2024-06-13 PROCEDURE — 93000 ELECTROCARDIOGRAM COMPLETE: CPT | Performed by: PHYSICIAN ASSISTANT

## 2024-06-13 PROCEDURE — 36415 COLL VENOUS BLD VENIPUNCTURE: CPT | Performed by: PHYSICIAN ASSISTANT

## 2024-06-13 PROCEDURE — 99214 OFFICE O/P EST MOD 30 MIN: CPT | Performed by: PHYSICIAN ASSISTANT

## 2024-06-13 PROCEDURE — 85025 COMPLETE CBC W/AUTO DIFF WBC: CPT | Performed by: PHYSICIAN ASSISTANT

## 2024-06-13 PROCEDURE — 80048 BASIC METABOLIC PNL TOTAL CA: CPT | Performed by: PHYSICIAN ASSISTANT

## 2024-06-13 PROCEDURE — 84443 ASSAY THYROID STIM HORMONE: CPT | Performed by: PHYSICIAN ASSISTANT

## 2024-06-13 RX ORDER — ALBUTEROL SULFATE 90 UG/1
AEROSOL, METERED RESPIRATORY (INHALATION)
Qty: 18 G | Refills: 3 | Status: SHIPPED | OUTPATIENT
Start: 2024-06-13

## 2024-06-13 ASSESSMENT — PAIN SCALES - GENERAL: PAINLEVEL: NO PAIN (0)

## 2024-06-13 NOTE — PATIENT INSTRUCTIONS
How to Take Your Medication Before Surgery  Preoperative Medication Instructions   Antiplatelet or Anticoagulation Medication Instructions   - aspirin: Discontinue aspirin 7-10 days prior to procedure to reduce bleeding risk. It should be resumed postoperatively.     Additional Medication Instructions  Take all scheduled medications on the day of surgery     Stop supplements 14 days prior to surgery  Patient Education   Preparing for Your Surgery  Getting started  A nurse will call you to review your health history and instructions. They will give you an arrival time based on your scheduled surgery time. Please be ready to share:  Your doctor's clinic name and phone number  Your medical, surgical, and anesthesia history  A list of allergies and sensitivities  A list of medicines, including herbal treatments and over-the-counter drugs  Whether the patient has a legal guardian (ask how to send us the papers in advance)  Please tell us if you're pregnant--or if there's any chance you might be pregnant. Some surgeries may injure a fetus (unborn baby), so they require a pregnancy test. Surgeries that are safe for a fetus don't always need a test, and you can choose whether to have one.   If you have a child who's having surgery, please ask for a copy of Preparing for Your Child's Surgery.    Preparing for surgery  Within 10 to 30 days of surgery: Have a pre-op exam (sometimes called an H&P, or History and Physical). This can be done at a clinic or pre-operative center.  If you're having a , you may not need this exam. Talk to your care team.  At your pre-op exam, talk to your care team about all medicines you take. If you need to stop any medicines before surgery, ask when to start taking them again.  We do this for your safety. Many medicines can make you bleed too much during surgery. Some change how well surgery (anesthesia) drugs work.  Call your insurance company to let them know you're having surgery. (If  you don't have insurance, call [phone].)  Call your clinic if there's any change in your health. This includes signs of a cold or flu (sore throat, runny nose, cough, rash, fever). It also includes a scrape or scratch near the surgery site.  If you have questions on the day of surgery, call your hospital or surgery center.  Eating and drinking guidelines  For your safety: Unless your surgeon tells you otherwise, follow the guidelines below.  Eat and drink as usual until 8 hours before you arrive for surgery. After that, no food or milk.  Drink clear liquids until 2 hours before you arrive. These are liquids you can see through, like water, Gatorade, and Propel Water. They also include plain black coffee and tea (no cream or milk), candy, and breath mints. You can spit out gum when you arrive.  If you drink alcohol: Stop drinking it the night before surgery.  If your care team tells you to take medicine on the morning of surgery, it's okay to take it with a sip of water.  Preventing infection  Shower or bathe the night before and morning of your surgery. Follow the instructions your clinic gave you. (If no instructions, use regular soap.)  Don't shave or clip hair near your surgery site. We'll remove the hair if needed.  Don't smoke or vape the morning of surgery. You may chew nicotine gum up to 2 hours before surgery. A nicotine patch is okay.  Note: Some surgeries require you to completely quit smoking and nicotine. Check with your surgeon.  Your care team will make every effort to keep you safe from infection. We will:  Clean our hands often with soap and water (or an alcohol-based hand rub).  Clean the skin at your surgery site with a special soap that kills germs.  Give you a special gown to keep you warm. (Cold raises the risk of infection.)  Wear special hair covers, masks, gowns and gloves during surgery.  Give antibiotic medicine, if prescribed. Not all surgeries need antibiotics.  What to bring on the  day of surgery  Photo ID and insurance card  Copy of your health care directive, if you have one  Glasses and hearing aids (bring cases)  You can't wear contacts during surgery  Inhaler and eye drops, if you use them (tell us about these when you arrive)  CPAP machine or breathing device, if you use them  A few personal items, if spending the night  If you have . . .  A pacemaker, ICD (cardiac defibrillator) or other implant: Bring the ID card.  An implanted stimulator: Bring the remote control.  A legal guardian: Bring a copy of the certified (court-stamped) guardianship papers.  Please remove any jewelry, including body piercings. Leave jewelry and other valuables at home.  If you're going home the day of surgery  You must have a responsible adult drive you home. They should stay with you overnight as well.  If you don't have someone to stay with you, and you aren't safe to go home alone, we may keep you overnight. Insurance often won't pay for this.  After surgery  If it's hard to control your pain or you need more pain medicine, please call your surgeon's office.  Questions?   If you have any questions for your care team, list them here: _________________________________________________________________________________________________________________________________________________________________________ ____________________________________ ____________________________________ ____________________________________  For informational purposes only. Not to replace the advice of your health care provider. Copyright   2003, 2019 [facility]. All rights reserved. Clinically reviewed by Vesna Castillo MD. [employer] 608149 - REV 12/22.

## 2024-06-13 NOTE — PROGRESS NOTES
Preoperative Evaluation  [facility]  [address] [facility] [address]  Phone: [phone]  Primary Provider: Kristen M. Kehr, PA-C  Pre-op Performing Provider: Kristen M. Kehr, PA-C  Jun 13, 2024 6/13/2024   Surgical Information   What procedure is being done? LEFT TOTAL HIP ARTHROPLASTY DIRECT  ANTERIOR APPROACH   Facility or Hospital where procedure/surgery will be performed: [facility]   Who is doing the procedure / surgery? Max Ang   Date of surgery / procedure: 07/08/24   Time of surgery / procedure: TBD   Where do you plan to recover after surgery? at home with family     Fax number for surgical facility: Note does not need to be faxed, will be available electronically in Epic.    Assessment & Plan     The proposed surgical procedure is considered HIGH risk.    Preop general physical exam  Primary osteoarthritis of left hip  - EKG 12-lead complete w/read - Clinics  - TSH with free T4 reflex; Future  - CBC with platelets and differential; Future  - Basic metabolic panel  (Ca, Cl, CO2, Creat, Gluc, K, Na, BUN); Future  - TSH with free T4 reflex  - CBC with platelets and differential  - Basic metabolic panel  (Ca, Cl, CO2, Creat, Gluc, K, Na, BUN)      Essential hypertension with goal blood pressure less than 140/90  Stable condition    Hyperlipidemia LDL goal <130  Stable condition    Other specified hypothyroidism  Stable condition  - TSH with free T4 reflex; Future  - TSH with free T4 reflex    Morbid obesity (H)         Visit for screening mammogram  - MA Screening Bilateral w/ Sebastien; Future     - No identified additional risk factors other than previously addressed    Antiplatelet or Anticoagulation Medication Instructions   - aspirin: Discontinue aspirin 7-10 days prior to procedure to reduce bleeding risk. It should be resumed postoperatively.     Additional Medication Instructions  Take medications as prescribed the morning of  surgery    Recommendation  Approval given to proceed with proposed procedure, without further diagnostic evaluation.        Subjective   Migdalia is a 69 year old, presenting for the following:  Pre-Op Exam          6/13/2024     9:34 AM   Additional Questions   Roomed by Chandan DICKSON MA     HPI related to upcoming procedure: Migdalia is  here today for preop clearance for left hip replacement she has arthritis causing pain and limited mobility        6/13/2024   Pre-Op Questionnaire   Have you ever had a heart attack or stroke? No   Have you ever had surgery on your heart or blood vessels, such as a stent placement, a coronary artery bypass, or surgery on an artery in your head, neck, heart, or legs? No   Do you have chest pain with activity? No   Do you have a history of heart failure? No   Do you currently have a cold, bronchitis or symptoms of other infection? No   Do you have a cough, shortness of breath, or wheezing? No   Do you or anyone in your family have previous history of blood clots? No   Do you or does anyone in your family have a serious bleeding problem such as prolonged bleeding following surgeries or cuts? No   Have you ever had problems with anemia or been told to take iron pills? No   Have you had any abnormal blood loss such as black, tarry or bloody stools, or abnormal vaginal bleeding? No   Have you ever had a blood transfusion? No   Are you willing to have a blood transfusion if it is medically needed before, during, or after your surgery? Yes   Have you or any of your relatives ever had problems with anesthesia? No   Do you have sleep apnea, excessive snoring or daytime drowsiness? No   Do you have any artifical heart valves or other implanted medical devices like a pacemaker, defibrillator, or continuous glucose monitor? No   Do you have artificial joints? No   Are you allergic to latex? No     Health Care Directive  Patient does not have a Health Care Directive or Living Will: Discussed advance  care planning with patient; information given to patient to review.    Preoperative Review of    reviewed - no record of controlled substances prescribed.      Status of Chronic Conditions:  HYPERLIPIDEMIA - Patient has a long history of significant Hyperlipidemia requiring medication for treatment with recent good control. Patient reports no problems or side effects with the medication.     HYPERTENSION - Patient has longstanding history of HTN , currently denies any symptoms referable to elevated blood pressure. Specifically denies chest pain, palpitations, dyspnea, orthopnea, PND or peripheral edema. Blood pressure readings have been in normal range. Current medication regimen is as listed below. Patient denies any side effects of medication.     HYPOTHYROIDISM - Patient has a longstanding history of chronic Hypothyroidism. Patient has been doing well, noting no tremor, insomnia, hair loss or changes in skin texture. Continues to take medications as directed, without adverse reactions or side effects. Last TSH   Lab Results   Component Value Date    TSH 1.88 01/11/2024   .      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 1/18/2012 SARAH Padron MA        Essential hypertension with goal blood pressure less  than 140/90 10/05/2016     Priority: Medium    History of colonic polyps 07/14/2016     Priority: Medium     Repeat colonoscopy every 5 years. Kristen Kehr PA-C        Hypothyroidism, unspecified hypothyroidism type 10/30/2015     Priority: Medium    Vitamin D deficiency 03/22/2014     Priority: Medium     Problem list name updated by automated process. Provider to review      H/O: hysterectomy 03/20/2013     Priority: Medium    Hyperlipidemia LDL goal <130 03/10/2011     Priority: Medium    Shortness of breath 01/22/2007     Priority: Medium    Esophageal reflux 01/22/2007     Priority: Medium    Chest pain, unspecified 01/22/2007     Priority: Medium      Past Medical History:   Diagnosis Date    High cholesterol     HTN     Hypothyroid     Pulmonary actinomycosis (H24)     Seasonal allergies     Uncomplicated asthma      Past Surgical History:   Procedure Laterality Date    COLONOSCOPY      COLONOSCOPY WITH CO2 INSUFFLATION N/A 7/16/2021    Procedure: COLONOSCOPY, WITH CO2 INSUFFLATION;  Surgeon: Wild Westbrook MD;  Location: [facility] OR    HYSTERECTOMY, PAP NO LONGER INDICATED      SURGICAL HISTORY OF -       thoracic spine-growth     Current Outpatient Medications   Medication Sig Dispense Refill    albuterol (VENTOLIN HFA) 108 (90 Base) MCG/ACT inhaler INHALE 2 PUFFS BY INHALATION ROUTE EVERY 4 HOURS AS NEEDED FOR SHORTNESS OF BREATH / DYSPNEA OR WHEEZING. USE WITH SPACER. Strength: 108 (90 Base) MCG/ACT 18 g 3    amLODIPine (NORVASC) 5 MG tablet Take 1 tablet (5 mg) by mouth daily 90 tablet 3    aspirin 81 MG tablet Take 1 tablet by mouth daily.      CALCIUM PO       hydrochlorothiazide (HYDRODIURIL) 25 MG tablet Take 1 tablet (25 mg) by mouth daily 90 tablet 3    levothyroxine (SYNTHROID/LEVOTHROID) 125 MCG tablet Take 1 tablet (125 mcg) by mouth daily 90 tablet 3    losartan (COZAAR) 100 MG tablet Take 1 tablet (100 mg) by mouth daily 90 tablet 3    metoprolol succinate ER (TOPROL XL) 100 MG 24 hr  "tablet Take 1 tablet (100 mg) by mouth daily 90 tablet 3    order for DME Equipment being ordered: compression stockings / mild compression 1 Units 3    simvastatin (ZOCOR) 40 MG tablet Take 1 tablet (40 mg) by mouth at bedtime 90 tablet 3    valACYclovir (VALTREX) 1000 mg tablet Take 2 tablets (2,000 mg) by mouth 2 times daily 30 tablet 3       Allergies   Allergen Reactions    Amoxicillin     Erythromycin         Social History     Tobacco Use    Smoking status: Former     Current packs/day: 0.00     Types: Cigarettes     Quit date: 2005     Years since quittin.5    Smokeless tobacco: Never    Tobacco comments:     no 2nd hand smoke exposure   Substance Use Topics    Alcohol use: Yes     Comment: 2-3 beers every day and an additional glass of wine     Family History   Problem Relation Age of Onset    Arthritis Mother     Prostate Cancer Father     Hypertension Father     Cancer Father         bone    Diabetes Maternal Grandfather     Thyroid Disease Sister      History   Drug Use No             Review of Systems  Constitutional, HEENT, cardiovascular, pulmonary, GI, , musculoskeletal, neuro, skin, endocrine and psych systems are negative, except as otherwise noted.    Objective    /68   Pulse 79   Temp 98.2  F (36.8  C) (Tympanic)   Resp 16   Ht 1.664 m (5' 5.5\")   Wt 102.5 kg (226 lb)   LMP  (LMP Unknown)   SpO2 95%   Breastfeeding No   BMI 37.04 kg/m     Estimated body mass index is 37.04 kg/m  as calculated from the following:    Height as of this encounter: 1.664 m (5' 5.5\").    Weight as of this encounter: 102.5 kg (226 lb).  Physical Exam  GENERAL: alert and no distress  EYES: Eyes grossly normal to inspection, PERRL and conjunctivae and sclerae normal  HENT: ear canals and TM's normal, nose and mouth without ulcers or lesions  NECK: no adenopathy, no asymmetry, masses, or scars  RESP: lungs clear to auscultation - no rales, rhonchi or wheezes  CV: regular rate and rhythm, normal " S1 S2, no S3 or S4, no murmur, click or rub, no peripheral edema  ABDOMEN: soft, nontender, no hepatosplenomegaly, no masses and bowel sounds normal  MS: no gross musculoskeletal defects noted, no edema  SKIN: no suspicious lesions or rashes  NEURO: Normal strength and tone, mentation intact and speech normal  PSYCH: mentation appears normal, affect normal/bright    Recent Labs   Lab Test 01/11/24  1010      POTASSIUM 4.2   CR 0.90        Diagnostics  Recent Results (from the past 240 hour(s))   TSH with free T4 reflex    Collection Time: 06/13/24 10:22 AM   Result Value Ref Range    TSH 1.55 0.30 - 4.20 uIU/mL   Basic metabolic panel  (Ca, Cl, CO2, Creat, Gluc, K, Na, BUN)    Collection Time: 06/13/24 10:22 AM   Result Value Ref Range    Sodium 139 135 - 145 mmol/L    Potassium 4.0 3.4 - 5.3 mmol/L    Chloride 103 98 - 107 mmol/L    Carbon Dioxide (CO2) 24 22 - 29 mmol/L    Anion Gap 12 7 - 15 mmol/L    Urea Nitrogen 24.5 (H) 8.0 - 23.0 mg/dL    Creatinine 0.95 0.51 - 0.95 mg/dL    GFR Estimate 64 >60 mL/min/1.73m2    Calcium 10.6 (H) 8.8 - 10.2 mg/dL    Glucose 99 70 - 99 mg/dL   CBC with platelets and differential    Collection Time: 06/13/24 10:22 AM   Result Value Ref Range    WBC Count 6.3 4.0 - 11.0 10e3/uL    RBC Count 4.35 3.80 - 5.20 10e6/uL    Hemoglobin 14.0 11.7 - 15.7 g/dL    Hematocrit 41.2 35.0 - 47.0 %    MCV 95 78 - 100 fL    MCH 32.2 26.5 - 33.0 pg    MCHC 34.0 31.5 - 36.5 g/dL    RDW 12.9 10.0 - 15.0 %    Platelet Count 218 150 - 450 10e3/uL    % Neutrophils 66 %    % Lymphocytes 23 %    % Monocytes 7 %    % Eosinophils 3 %    % Basophils 1 %    % Immature Granulocytes 0 %    Absolute Neutrophils 4.2 1.6 - 8.3 10e3/uL    Absolute Lymphocytes 1.5 0.8 - 5.3 10e3/uL    Absolute Monocytes 0.5 0.0 - 1.3 10e3/uL    Absolute Eosinophils 0.2 0.0 - 0.7 10e3/uL    Absolute Basophils 0.0 0.0 - 0.2 10e3/uL    Absolute Immature Granulocytes 0.0 <=0.4 10e3/uL      EKG: appears normal, NSR, normal axis,  normal intervals, no acute ST/T changes c/w ischemia, no LVH by voltage criteria, unchanged from previous tracings    Revised Cardiac Risk Index (RCRI)  The patient has the following serious cardiovascular risks for perioperative complications:   - High risk surgery (>5% cardiac complication risk) = 1 point     RCRI Interpretation: 1 point: Class II (low risk - 0.9% complication rate)         Signed Electronically by: Kristen M. Kehr, PA-C  Copy of this evaluation report is provided to requesting physician.

## 2024-06-17 PROBLEM — Z71.89 ADVANCED DIRECTIVES, COUNSELING/DISCUSSION: Status: RESOLVED | Noted: 2017-05-24 | Resolved: 2024-06-17

## 2024-06-18 NOTE — PROGRESS NOTES
Preop Evaluation Fax number for surgical facility: Note does not need to be faxed, will be available electronically in Epic.   Lori TEJADA    [facility]

## 2024-07-11 ENCOUNTER — TRANSFERRED RECORDS (OUTPATIENT)
Dept: HEALTH INFORMATION MANAGEMENT | Facility: CLINIC | Age: 69
End: 2024-07-11
Payer: COMMERCIAL

## 2024-07-23 ENCOUNTER — TRANSFERRED RECORDS (OUTPATIENT)
Dept: HEALTH INFORMATION MANAGEMENT | Facility: CLINIC | Age: 69
End: 2024-07-23
Payer: COMMERCIAL

## 2024-08-20 ENCOUNTER — TRANSFERRED RECORDS (OUTPATIENT)
Dept: HEALTH INFORMATION MANAGEMENT | Facility: CLINIC | Age: 69
End: 2024-08-20
Payer: COMMERCIAL

## 2024-10-06 ENCOUNTER — OFFICE VISIT (OUTPATIENT)
Dept: URGENT CARE | Facility: URGENT CARE | Age: 69
End: 2024-10-06
Payer: COMMERCIAL

## 2024-10-06 ENCOUNTER — ANCILLARY PROCEDURE (OUTPATIENT)
Dept: GENERAL RADIOLOGY | Facility: CLINIC | Age: 69
End: 2024-10-06
Attending: FAMILY MEDICINE
Payer: COMMERCIAL

## 2024-10-06 VITALS
OXYGEN SATURATION: 97 % | RESPIRATION RATE: 14 BRPM | SYSTOLIC BLOOD PRESSURE: 141 MMHG | BODY MASS INDEX: 35.56 KG/M2 | TEMPERATURE: 97.6 F | DIASTOLIC BLOOD PRESSURE: 84 MMHG | WEIGHT: 217 LBS | HEART RATE: 69 BPM

## 2024-10-06 DIAGNOSIS — J22 LOWER RESPIRATORY INFECTION: Primary | ICD-10-CM

## 2024-10-06 DIAGNOSIS — R05.1 ACUTE COUGH: ICD-10-CM

## 2024-10-06 PROCEDURE — 99214 OFFICE O/P EST MOD 30 MIN: CPT | Performed by: FAMILY MEDICINE

## 2024-10-06 PROCEDURE — 71046 X-RAY EXAM CHEST 2 VIEWS: CPT | Mod: TC | Performed by: RADIOLOGY

## 2024-10-06 RX ORDER — BENZONATATE 100 MG/1
100 CAPSULE ORAL 3 TIMES DAILY PRN
Qty: 30 CAPSULE | Refills: 0 | Status: SHIPPED | OUTPATIENT
Start: 2024-10-06

## 2024-10-06 NOTE — PROGRESS NOTES
Assessment & Plan   (J22) Lower respiratory infection  (primary encounter diagnosis)  Comment: Differentials discussed in detail including lower respiratory tract infection, pneumonia.  Chest x-ray findings consistent with infiltrate on the lateral view, suggestive of right lower lobe/right infrahilar pneumonia.  Patient allergic to amoxicillin, erythromycin.  Omnicef and doxycycline prescribed.  Recommended well hydration, warm fluids and Tessalon prescribed for cough control.  Suggested to follow-up with PCP in 3 to 5 days or earlier if needed.  All questions answered.  Plan: cefdinir (OMNICEF) 300 MG capsule, doxycycline         hyclate (VIBRAMYCIN) 100 MG capsule            (R05.1) Acute cough  Comment:   Plan: XR Chest 2 Views, benzonatate (TESSALON) 100 MG        capsule        Deidra Negron is a 69 year old, presenting for the following health issues:  Cough (Congestion ( since Monday))    HPI   Concern -   Onset: 6 days  Description: productive cough, congestion and some wheezing   Intensity: moderate  Progression of Symptoms:  same  Accompanying Signs & Symptoms: no fever, chills, sob, chest pain, palpitation, sore throat or other relevant systemic symptoms   Previous history of similar problem:   Therapies tried and outcome: albuterol, cough medicine      Review of Systems  Constitutional, HEENT, cardiovascular, pulmonary, gi and gu systems are negative, except as otherwise noted.      Objective    BP (!) 141/84   Pulse 69   Temp 97.6  F (36.4  C)   Resp 14   Wt 98.4 kg (217 lb)   LMP  (LMP Unknown)   SpO2 97%   BMI 35.56 kg/m    Body mass index is 35.56 kg/m .  Physical Exam   GENERAL: alert and no distress  EYES: Eyes grossly normal to inspection, PERRL and conjunctivae and sclerae normal  HENT: normal cephalic/atraumatic, ear canals and TM's normal, nose and mouth without ulcers or lesions, oropharynx clear, and oral mucous membranes moist  NECK: no adenopathy, no asymmetry, masses, or  scars  RESP: lungs clear to auscultation - no rales, rhonchi or wheezes  CV: regular rates and rhythm, normal S1 S2, no S3 or S4, and no murmur, click or rub  MS: no gross musculoskeletal defects noted, no edema  SKIN: no suspicious lesions or rashes  NEURO: Normal strength and tone, mentation intact and speech normal  PSYCH: mentation appears normal, affect normal/bright      Results for orders placed or performed in visit on 10/06/24   XR Chest 2 Views     Status: None    Narrative    EXAM: XR CHEST 2 VIEWS  LOCATION: [facility]  DATE: 10/6/2024    INDICATION:  Acute cough  COMPARISON: 7/11/2016      Impression    IMPRESSION:  Hyperinflation versus deep inspiration changes. Clinical correlation for air trapping. Calcified ectatic aortic arch. Heart size and pulmonary vascularity within normal limits. Infiltrate or opacity projecting over the thoracic spine on the   lateral view likely in the right lower lobe/right infrahilar region. Osseous structures grossly intact. Visualized upper abdomen unremarkable.             Signed Electronically by: Renato Rai MD

## 2024-10-07 RX ORDER — CEFDINIR 300 MG/1
300 CAPSULE ORAL 2 TIMES DAILY
Qty: 14 CAPSULE | Refills: 0 | Status: SHIPPED | OUTPATIENT
Start: 2024-10-07 | End: 2024-10-14

## 2024-10-07 RX ORDER — DOXYCYCLINE 100 MG/1
100 CAPSULE ORAL 2 TIMES DAILY
Qty: 14 CAPSULE | Refills: 0 | Status: SHIPPED | OUTPATIENT
Start: 2024-10-07 | End: 2024-10-14

## 2024-12-03 ENCOUNTER — PATIENT OUTREACH (OUTPATIENT)
Dept: FAMILY MEDICINE | Facility: CLINIC | Age: 69
End: 2024-12-03
Payer: COMMERCIAL

## 2024-12-03 NOTE — LETTER
December 3, 2024    To  Migdalia Escalera  [address] [place] [facility] [address]    Your team at [facility] cares about your health. We have reviewed your chart and based on our findings; we are making the following recommendations to better manage your health.     You are in particular need of attention regarding the following:     HYPERTENSION FOLLOW UP: Blood pressure check with nurse, please schedule a ancillary blood pressure check. Blood pressure was high on 10/06/24.     If you have already completed these items, please contact the clinic via phone or   [facility] so your care team can review and update your records. Thank you for   choosing [facility] for your healthcare needs. For any questions,   concerns, or to schedule an appointment please contact our clinic.    Healthy Regards,      Your [facility] Care Team

## 2024-12-03 NOTE — TELEPHONE ENCOUNTER
Patient Quality Outreach    Patient is due for the following:   Hypertension -  BP check    Action(s) Taken:   Schedule a nurse only visit for blood pressure check, blood pressure was high on 10/06/24    Type of outreach:    Sent letter.    Questions for provider review:    None           Chandan Garcia MA

## 2024-12-31 DIAGNOSIS — E03.8 OTHER SPECIFIED HYPOTHYROIDISM: ICD-10-CM

## 2024-12-31 RX ORDER — LEVOTHYROXINE SODIUM 125 UG/1
125 TABLET ORAL DAILY
Qty: 90 TABLET | Refills: 1 | Status: SHIPPED | OUTPATIENT
Start: 2024-12-31

## 2025-02-13 ENCOUNTER — TELEPHONE (OUTPATIENT)
Dept: FAMILY MEDICINE | Facility: CLINIC | Age: 70
End: 2025-02-13
Payer: COMMERCIAL

## 2025-02-13 NOTE — TELEPHONE ENCOUNTER
Reason for Call:  Appointment Request    Patient requesting this type of appt:  Preventive     Requested provider: Kehr, Kristen M    Reason patient unable to be scheduled: Not within requested timeframe    When does patient want to be seen/preferred time:  sometime in late Feb or in March, also open to early April    Comments: Annual Wellness visit    Okay to leave a detailed message?: Yes at Cell number on file:    Telephone Information:   Mobile [phone]       Call taken on 2/13/2025 at 1:29 PM by Charity Martin

## 2025-03-04 ENCOUNTER — PATIENT OUTREACH (OUTPATIENT)
Dept: FAMILY MEDICINE | Facility: CLINIC | Age: 70
End: 2025-03-04
Payer: COMMERCIAL

## 2025-03-04 NOTE — TELEPHONE ENCOUNTER
Patient Quality Outreach    Patient is due for the following:   Physical Annual Wellness Visit    Action(s) Taken:   Patient was scheduled for Annual Wellness on 03/17/25 with Kristen Kehr PA-C.    Type of outreach:    Chart review performed, no outreach needed.    Questions for provider review:    None           Chandan Garcia MA

## 2025-03-10 DIAGNOSIS — E78.5 HYPERLIPIDEMIA LDL GOAL <130: ICD-10-CM

## 2025-03-10 DIAGNOSIS — I10 ESSENTIAL HYPERTENSION WITH GOAL BLOOD PRESSURE LESS THAN 140/90: ICD-10-CM

## 2025-03-10 RX ORDER — HYDROCHLOROTHIAZIDE 25 MG/1
25 TABLET ORAL DAILY
Qty: 7 TABLET | Refills: 0 | Status: SHIPPED | OUTPATIENT
Start: 2025-03-10

## 2025-03-10 RX ORDER — METOPROLOL SUCCINATE 100 MG/1
100 TABLET, EXTENDED RELEASE ORAL DAILY
Qty: 7 TABLET | Refills: 0 | Status: SHIPPED | OUTPATIENT
Start: 2025-03-10

## 2025-03-10 RX ORDER — LOSARTAN POTASSIUM 100 MG/1
100 TABLET ORAL DAILY
Qty: 7 TABLET | Refills: 0 | Status: SHIPPED | OUTPATIENT
Start: 2025-03-10

## 2025-03-10 RX ORDER — SIMVASTATIN 40 MG
40 TABLET ORAL AT BEDTIME
Qty: 7 TABLET | Refills: 0 | Status: SHIPPED | OUTPATIENT
Start: 2025-03-10

## 2025-03-10 RX ORDER — AMLODIPINE BESYLATE 5 MG/1
5 TABLET ORAL DAILY
Qty: 7 TABLET | Refills: 0 | Status: SHIPPED | OUTPATIENT
Start: 2025-03-10

## (undated) DEVICE — PAD CHUX UNDERPAD 23X24" 7136

## (undated) DEVICE — PREP CHLORAPREP 26ML TINTED ORANGE  260815

## (undated) DEVICE — KIT ENDO FIRST STEP DISINFECTANT 200ML W/POUCH EP-4

## (undated) RX ORDER — FENTANYL CITRATE 50 UG/ML
INJECTION, SOLUTION INTRAMUSCULAR; INTRAVENOUS
Status: DISPENSED
Start: 2021-07-16

## (undated) RX ORDER — SIMETHICONE 40MG/0.6ML
SUSPENSION, DROPS(FINAL DOSAGE FORM)(ML) ORAL
Status: DISPENSED
Start: 2021-07-16